# Patient Record
Sex: FEMALE | Race: WHITE | NOT HISPANIC OR LATINO | URBAN - METROPOLITAN AREA
[De-identification: names, ages, dates, MRNs, and addresses within clinical notes are randomized per-mention and may not be internally consistent; named-entity substitution may affect disease eponyms.]

---

## 2017-11-29 PROBLEM — Z00.00 ENCOUNTER FOR PREVENTIVE HEALTH EXAMINATION: Status: ACTIVE | Noted: 2017-11-29

## 2019-04-25 ENCOUNTER — INPATIENT (INPATIENT)
Facility: HOSPITAL | Age: 79
LOS: 3 days | Discharge: NURSING, REHAB OR LONG-TERM CARE | DRG: 683 | End: 2019-04-29
Attending: INTERNAL MEDICINE | Admitting: INTERNAL MEDICINE
Payer: MEDICARE

## 2019-04-25 VITALS
HEART RATE: 118 BPM | RESPIRATION RATE: 20 BRPM | SYSTOLIC BLOOD PRESSURE: 97 MMHG | TEMPERATURE: 98 F | OXYGEN SATURATION: 92 % | DIASTOLIC BLOOD PRESSURE: 59 MMHG | WEIGHT: 199.96 LBS | HEIGHT: 67 IN

## 2019-04-25 DIAGNOSIS — K21.9 GASTRO-ESOPHAGEAL REFLUX DISEASE WITHOUT ESOPHAGITIS: ICD-10-CM

## 2019-04-25 DIAGNOSIS — N17.9 ACUTE KIDNEY FAILURE, UNSPECIFIED: ICD-10-CM

## 2019-04-25 DIAGNOSIS — I50.9 HEART FAILURE, UNSPECIFIED: ICD-10-CM

## 2019-04-25 DIAGNOSIS — E11.9 TYPE 2 DIABETES MELLITUS WITHOUT COMPLICATIONS: ICD-10-CM

## 2019-04-25 DIAGNOSIS — Z29.9 ENCOUNTER FOR PROPHYLACTIC MEASURES, UNSPECIFIED: ICD-10-CM

## 2019-04-25 DIAGNOSIS — I25.10 ATHEROSCLEROTIC HEART DISEASE OF NATIVE CORONARY ARTERY WITHOUT ANGINA PECTORIS: ICD-10-CM

## 2019-04-25 DIAGNOSIS — J44.9 CHRONIC OBSTRUCTIVE PULMONARY DISEASE, UNSPECIFIED: ICD-10-CM

## 2019-04-25 LAB
ALBUMIN SERPL ELPH-MCNC: 3.4 G/DL — LOW (ref 3.5–5)
ALP SERPL-CCNC: 80 U/L — SIGNIFICANT CHANGE UP (ref 40–120)
ALT FLD-CCNC: 18 U/L DA — SIGNIFICANT CHANGE UP (ref 10–60)
ANION GAP SERPL CALC-SCNC: 16 MMOL/L — SIGNIFICANT CHANGE UP (ref 5–17)
APPEARANCE UR: ABNORMAL
AST SERPL-CCNC: 13 U/L — SIGNIFICANT CHANGE UP (ref 10–40)
BASOPHILS # BLD AUTO: 0.03 K/UL — SIGNIFICANT CHANGE UP (ref 0–0.2)
BASOPHILS NFR BLD AUTO: 0.3 % — SIGNIFICANT CHANGE UP (ref 0–2)
BILIRUB SERPL-MCNC: 0.7 MG/DL — SIGNIFICANT CHANGE UP (ref 0.2–1.2)
BILIRUB UR-MCNC: NEGATIVE — SIGNIFICANT CHANGE UP
BUN SERPL-MCNC: 152 MG/DL — HIGH (ref 7–18)
CALCIUM SERPL-MCNC: 9.9 MG/DL — SIGNIFICANT CHANGE UP (ref 8.4–10.5)
CHLORIDE SERPL-SCNC: 86 MMOL/L — LOW (ref 96–108)
CK SERPL-CCNC: 63 U/L — SIGNIFICANT CHANGE UP (ref 21–215)
CO2 SERPL-SCNC: 32 MMOL/L — HIGH (ref 22–31)
COLOR SPEC: YELLOW — SIGNIFICANT CHANGE UP
CREAT ?TM UR-MCNC: 53 MG/DL — SIGNIFICANT CHANGE UP
CREAT SERPL-MCNC: 4.16 MG/DL — HIGH (ref 0.5–1.3)
DIFF PNL FLD: ABNORMAL
EOSINOPHIL # BLD AUTO: 0.04 K/UL — SIGNIFICANT CHANGE UP (ref 0–0.5)
EOSINOPHIL NFR BLD AUTO: 0.3 % — SIGNIFICANT CHANGE UP (ref 0–6)
GLUCOSE BLDC GLUCOMTR-MCNC: 168 MG/DL — HIGH (ref 70–99)
GLUCOSE BLDC GLUCOMTR-MCNC: 187 MG/DL — HIGH (ref 70–99)
GLUCOSE SERPL-MCNC: 232 MG/DL — HIGH (ref 70–99)
GLUCOSE UR QL: 250
HCT VFR BLD CALC: 38.7 % — SIGNIFICANT CHANGE UP (ref 34.5–45)
HGB BLD-MCNC: 12.7 G/DL — SIGNIFICANT CHANGE UP (ref 11.5–15.5)
IMM GRANULOCYTES NFR BLD AUTO: 0.9 % — SIGNIFICANT CHANGE UP (ref 0–1.5)
INR BLD: 1.16 RATIO — SIGNIFICANT CHANGE UP (ref 0.88–1.16)
KETONES UR-MCNC: ABNORMAL
LACTATE SERPL-SCNC: 2.2 MMOL/L — HIGH (ref 0.7–2)
LEUKOCYTE ESTERASE UR-ACNC: ABNORMAL
LIDOCAIN IGE QN: 171 U/L — SIGNIFICANT CHANGE UP (ref 73–393)
LYMPHOCYTES # BLD AUTO: 0.61 K/UL — LOW (ref 1–3.3)
LYMPHOCYTES # BLD AUTO: 5.2 % — LOW (ref 13–44)
MCHC RBC-ENTMCNC: 30.2 PG — SIGNIFICANT CHANGE UP (ref 27–34)
MCHC RBC-ENTMCNC: 32.8 GM/DL — SIGNIFICANT CHANGE UP (ref 32–36)
MCV RBC AUTO: 91.9 FL — SIGNIFICANT CHANGE UP (ref 80–100)
MONOCYTES # BLD AUTO: 0.59 K/UL — SIGNIFICANT CHANGE UP (ref 0–0.9)
MONOCYTES NFR BLD AUTO: 5.1 % — SIGNIFICANT CHANGE UP (ref 2–14)
MYOGLOBIN SERPL-MCNC: 226 NG/ML — HIGH (ref 9–82)
NEUTROPHILS # BLD AUTO: 10.3 K/UL — HIGH (ref 1.8–7.4)
NEUTROPHILS NFR BLD AUTO: 88.2 % — HIGH (ref 43–77)
NITRITE UR-MCNC: NEGATIVE — SIGNIFICANT CHANGE UP
NRBC # BLD: 0 /100 WBCS — SIGNIFICANT CHANGE UP (ref 0–0)
OSMOLALITY UR: 401 MOS/KG — SIGNIFICANT CHANGE UP (ref 50–1200)
PH UR: 5 — SIGNIFICANT CHANGE UP (ref 5–8)
PLATELET # BLD AUTO: 207 K/UL — SIGNIFICANT CHANGE UP (ref 150–400)
POTASSIUM SERPL-MCNC: 4 MMOL/L — SIGNIFICANT CHANGE UP (ref 3.5–5.3)
POTASSIUM SERPL-SCNC: 4 MMOL/L — SIGNIFICANT CHANGE UP (ref 3.5–5.3)
PROT SERPL-MCNC: 7.1 G/DL — SIGNIFICANT CHANGE UP (ref 6–8.3)
PROT UR-MCNC: 30 MG/DL
PROTHROM AB SERPL-ACNC: 12.9 SEC — SIGNIFICANT CHANGE UP (ref 10–12.9)
RBC # BLD: 4.21 M/UL — SIGNIFICANT CHANGE UP (ref 3.8–5.2)
RBC # FLD: 14.9 % — HIGH (ref 10.3–14.5)
SODIUM SERPL-SCNC: 134 MMOL/L — LOW (ref 135–145)
SODIUM UR-SCNC: 45 MMOL/L — SIGNIFICANT CHANGE UP (ref 40–220)
SP GR SPEC: 1.01 — SIGNIFICANT CHANGE UP (ref 1.01–1.02)
TROPONIN I SERPL-MCNC: 0.09 NG/ML — HIGH (ref 0–0.04)
UROBILINOGEN FLD QL: NEGATIVE — SIGNIFICANT CHANGE UP
WBC # BLD: 11.68 K/UL — HIGH (ref 3.8–10.5)
WBC # FLD AUTO: 11.68 K/UL — HIGH (ref 3.8–10.5)

## 2019-04-25 PROCEDURE — 71250 CT THORAX DX C-: CPT | Mod: 26

## 2019-04-25 PROCEDURE — 72170 X-RAY EXAM OF PELVIS: CPT | Mod: 26

## 2019-04-25 PROCEDURE — 93010 ELECTROCARDIOGRAM REPORT: CPT

## 2019-04-25 PROCEDURE — 99285 EMERGENCY DEPT VISIT HI MDM: CPT | Mod: 25

## 2019-04-25 PROCEDURE — 71045 X-RAY EXAM CHEST 1 VIEW: CPT | Mod: 26

## 2019-04-25 RX ORDER — AZITHROMYCIN 500 MG/1
500 TABLET, FILM COATED ORAL DAILY
Qty: 0 | Refills: 0 | Status: DISCONTINUED | OUTPATIENT
Start: 2019-04-25 | End: 2019-04-29

## 2019-04-25 RX ORDER — ALBUTEROL 90 UG/1
2 AEROSOL, METERED ORAL EVERY 6 HOURS
Qty: 0 | Refills: 0 | Status: DISCONTINUED | OUTPATIENT
Start: 2019-04-25 | End: 2019-04-29

## 2019-04-25 RX ORDER — SODIUM CHLORIDE 9 MG/ML
1000 INJECTION INTRAMUSCULAR; INTRAVENOUS; SUBCUTANEOUS
Qty: 0 | Refills: 0 | Status: DISCONTINUED | OUTPATIENT
Start: 2019-04-25 | End: 2019-04-25

## 2019-04-25 RX ORDER — SODIUM CHLORIDE 9 MG/ML
500 INJECTION INTRAMUSCULAR; INTRAVENOUS; SUBCUTANEOUS ONCE
Qty: 0 | Refills: 0 | Status: COMPLETED | OUTPATIENT
Start: 2019-04-25 | End: 2019-04-25

## 2019-04-25 RX ORDER — MULTIVIT-MIN/FERROUS GLUCONATE 9 MG/15 ML
1 LIQUID (ML) ORAL DAILY
Qty: 0 | Refills: 0 | Status: DISCONTINUED | OUTPATIENT
Start: 2019-04-25 | End: 2019-04-29

## 2019-04-25 RX ORDER — CEFTRIAXONE 500 MG/1
1 INJECTION, POWDER, FOR SOLUTION INTRAMUSCULAR; INTRAVENOUS EVERY 24 HOURS
Qty: 0 | Refills: 0 | Status: DISCONTINUED | OUTPATIENT
Start: 2019-04-25 | End: 2019-04-29

## 2019-04-25 RX ORDER — SODIUM CHLORIDE 9 MG/ML
1000 INJECTION INTRAMUSCULAR; INTRAVENOUS; SUBCUTANEOUS ONCE
Qty: 0 | Refills: 0 | Status: COMPLETED | OUTPATIENT
Start: 2019-04-25 | End: 2019-04-25

## 2019-04-25 RX ORDER — BUDESONIDE AND FORMOTEROL FUMARATE DIHYDRATE 160; 4.5 UG/1; UG/1
2 AEROSOL RESPIRATORY (INHALATION)
Qty: 0 | Refills: 0 | Status: DISCONTINUED | OUTPATIENT
Start: 2019-04-25 | End: 2019-04-29

## 2019-04-25 RX ORDER — FLUCONAZOLE 150 MG/1
400 TABLET ORAL ONCE
Qty: 0 | Refills: 0 | Status: COMPLETED | OUTPATIENT
Start: 2019-04-25 | End: 2019-04-25

## 2019-04-25 RX ORDER — ACETAMINOPHEN 500 MG
1000 TABLET ORAL ONCE
Qty: 0 | Refills: 0 | Status: COMPLETED | OUTPATIENT
Start: 2019-04-25 | End: 2019-04-25

## 2019-04-25 RX ORDER — FLUCONAZOLE 150 MG/1
100 TABLET ORAL EVERY 24 HOURS
Qty: 0 | Refills: 0 | Status: DISCONTINUED | OUTPATIENT
Start: 2019-04-26 | End: 2019-04-29

## 2019-04-25 RX ORDER — NYSTATIN 500MM UNIT
500000 POWDER (EA) MISCELLANEOUS
Qty: 0 | Refills: 0 | Status: DISCONTINUED | OUTPATIENT
Start: 2019-04-25 | End: 2019-04-29

## 2019-04-25 RX ORDER — PANTOPRAZOLE SODIUM 20 MG/1
40 TABLET, DELAYED RELEASE ORAL
Qty: 0 | Refills: 0 | Status: DISCONTINUED | OUTPATIENT
Start: 2019-04-25 | End: 2019-04-29

## 2019-04-25 RX ORDER — INSULIN LISPRO 100/ML
VIAL (ML) SUBCUTANEOUS
Qty: 0 | Refills: 0 | Status: DISCONTINUED | OUTPATIENT
Start: 2019-04-25 | End: 2019-04-29

## 2019-04-25 RX ORDER — TIOTROPIUM BROMIDE 18 UG/1
1 CAPSULE ORAL; RESPIRATORY (INHALATION) DAILY
Qty: 0 | Refills: 0 | Status: DISCONTINUED | OUTPATIENT
Start: 2019-04-25 | End: 2019-04-29

## 2019-04-25 RX ORDER — ASPIRIN/CALCIUM CARB/MAGNESIUM 324 MG
325 TABLET ORAL DAILY
Qty: 0 | Refills: 0 | Status: DISCONTINUED | OUTPATIENT
Start: 2019-04-25 | End: 2019-04-29

## 2019-04-25 RX ORDER — HEPARIN SODIUM 5000 [USP'U]/ML
5000 INJECTION INTRAVENOUS; SUBCUTANEOUS EVERY 8 HOURS
Qty: 0 | Refills: 0 | Status: DISCONTINUED | OUTPATIENT
Start: 2019-04-25 | End: 2019-04-29

## 2019-04-25 RX ADMIN — TIOTROPIUM BROMIDE 1 CAPSULE(S): 18 CAPSULE ORAL; RESPIRATORY (INHALATION) at 15:32

## 2019-04-25 RX ADMIN — SODIUM CHLORIDE 1000 MILLILITER(S): 9 INJECTION INTRAMUSCULAR; INTRAVENOUS; SUBCUTANEOUS at 13:50

## 2019-04-25 RX ADMIN — Medication 1000 MILLIGRAM(S): at 10:29

## 2019-04-25 RX ADMIN — PANTOPRAZOLE SODIUM 40 MILLIGRAM(S): 20 TABLET, DELAYED RELEASE ORAL at 13:49

## 2019-04-25 RX ADMIN — AZITHROMYCIN 500 MILLIGRAM(S): 500 TABLET, FILM COATED ORAL at 13:49

## 2019-04-25 RX ADMIN — SODIUM CHLORIDE 60 MILLILITER(S): 9 INJECTION INTRAMUSCULAR; INTRAVENOUS; SUBCUTANEOUS at 17:48

## 2019-04-25 RX ADMIN — Medication 500000 UNIT(S): at 23:23

## 2019-04-25 RX ADMIN — HEPARIN SODIUM 5000 UNIT(S): 5000 INJECTION INTRAVENOUS; SUBCUTANEOUS at 13:47

## 2019-04-25 RX ADMIN — HEPARIN SODIUM 5000 UNIT(S): 5000 INJECTION INTRAVENOUS; SUBCUTANEOUS at 23:21

## 2019-04-25 RX ADMIN — SODIUM CHLORIDE 75 MILLILITER(S): 9 INJECTION INTRAMUSCULAR; INTRAVENOUS; SUBCUTANEOUS at 15:08

## 2019-04-25 RX ADMIN — Medication 325 MILLIGRAM(S): at 13:48

## 2019-04-25 RX ADMIN — SODIUM CHLORIDE 1000 MILLILITER(S): 9 INJECTION INTRAMUSCULAR; INTRAVENOUS; SUBCUTANEOUS at 10:29

## 2019-04-25 RX ADMIN — Medication 500000 UNIT(S): at 13:47

## 2019-04-25 RX ADMIN — SODIUM CHLORIDE 1000 MILLILITER(S): 9 INJECTION INTRAMUSCULAR; INTRAVENOUS; SUBCUTANEOUS at 08:39

## 2019-04-25 RX ADMIN — FLUCONAZOLE 100 MILLIGRAM(S): 150 TABLET ORAL at 13:49

## 2019-04-25 RX ADMIN — Medication 500000 UNIT(S): at 18:02

## 2019-04-25 RX ADMIN — Medication 1: at 16:44

## 2019-04-25 RX ADMIN — Medication 1: at 23:22

## 2019-04-25 RX ADMIN — CEFTRIAXONE 100 GRAM(S): 500 INJECTION, POWDER, FOR SOLUTION INTRAMUSCULAR; INTRAVENOUS at 15:32

## 2019-04-25 RX ADMIN — Medication 20 MILLIGRAM(S): at 13:48

## 2019-04-25 RX ADMIN — Medication 1 TABLET(S): at 13:48

## 2019-04-25 RX ADMIN — BUDESONIDE AND FORMOTEROL FUMARATE DIHYDRATE 2 PUFF(S): 160; 4.5 AEROSOL RESPIRATORY (INHALATION) at 23:23

## 2019-04-25 RX ADMIN — Medication 400 MILLIGRAM(S): at 08:39

## 2019-04-25 NOTE — H&P ADULT - ASSESSMENT
HPI: 79 y/o old female from home with PMHx of HTN, COPD on home oxygen 4 liters , obesity, ,CAD, Aortic Valve replacement only on full dose aspirin DM, lower back pain who was brought to the hospital by daughter due to  weakness last few weeks and very poor oral intake, worsening last 2 days, due to sore throat. Family also report that her knees gave in and she slid down to the floor, no trauma or LOC, chest pain or palpitation. Unable to get up for 2-3 hrs. Denies nausea, vomiting, diarrhea, abdominal pain, SOB, chest pain, FREEMAN, palpitations, dizziness, headache, cough, wheezing, joint pain or swelling, fever, chills.     ER work up was done . Labs showing acute renal failure, CT chest was done showing possible consolidation. Discussed with family members she has history of pulmonary infiltrates on the right side.(NOT A NEW FINDING, will continue to follow up as outpatient)    Will be admitted to the floor due to KYLE likely secondary to Poor oral intake in the setting of thrush. Further renal work up will be done

## 2019-04-25 NOTE — ED PROVIDER NOTE - PROGRESS NOTE DETAILS
Patient remains comfortable. No Respiratory distress. Workup shows KYLE and elevated troponin. Will admit. Sign out complete with Dr. Alicea and Dr. Edward.

## 2019-04-25 NOTE — PROGRESS NOTE ADULT - SUBJECTIVE AND OBJECTIVE BOX
Patient is a 78y old  Female who presents with a chief complaint of weakness last few weeks/poor po intake/fall at home, lab showed ARF, IV hydration, renal consult, renal sonogram, treat candida esophagitis, monitor BMP daily for 3 days. X ray of l spine    INTERVAL HPI/OVERNIGHT EVENTS:  T(C): 36.6 (04-25-19 @ 10:32), Max: 36.7 (04-25-19 @ 07:48)  HR: 68 (04-25-19 @ 10:32) (68 - 118)  BP: 100/78 (04-25-19 @ 10:32) (97/59 - 150/109)  RR: 16 (04-25-19 @ 10:32) (16 - 20)  SpO2: 100% (04-25-19 @ 09:18) (92% - 100%)  Wt(kg): --    LABS:                        12.7   11.68 )-----------( 207      ( 25 Apr 2019 08:44 )             38.7     04-25    134<L>  |  86<L>  |  152<H>  ----------------------------<  232<H>  4.0   |  32<H>  |  4.16<H>    Ca    9.9      25 Apr 2019 08:44    TPro  7.1  /  Alb  3.4<L>  /  TBili  0.7  /  DBili  x   /  AST  13  /  ALT  18  /  AlkPhos  80  04-25    PT/INR - ( 25 Apr 2019 08:44 )   PT: 12.9 sec;   INR: 1.16 ratio             CAPILLARY BLOOD GLUCOSE      POCT Blood Glucose.: 222 mg/dL (25 Apr 2019 07:54)        RADIOLOGY & ADDITIONAL TESTS:  < from: CT Chest No Cont (04.25.19 @ 10:28) >    IMPRESSION:    When compared with chest x-ray of same date    Patchy areas of consolidation in the right upper lobe correspond to the   abnormality seen on recent chest x-ray. The differential for this   includes infection, sarcoidor malignancy. Follow-up chest CT recommended   in 4-6 weeks to assess for change.    < from: Xray Pelvis AP only (04.25.19 @ 09:03) >    IMPRESSION:    Diffuse osteoporosis may delay visualization fracture.    Otherwise unremarkable.      < end of copied text >      Consultant(s) Notes Reviewed:  [x ] YES  [ ] NO    PHYSICAL EXAM:  GENERAL: well built, well nourished  HEAD:  Atraumatic, Normocephalic  EYES: EOMI, PERRLA, conjunctiva and sclera clear  ENT: No tonsillar erythema, exudates, or enlargement; Moist mucous membranes, Good dentition, No lesions  NECK: Supple, No JVD, Normal thyroid, no enlarged nodes  NERVOUS SYSTEM:  Alert & Oriented X3, Good concentration; mild LLE limit ROM due to pain  CHEST/LUNG: B/L good air entry; No rales, rhonchi, or wheezing  HEART: S1S2 normal, no S3, Regular rate and rhythm; No murmurs, rubs, or gallops  ABDOMEN: Soft, Nontender, Nondistended; Bowel sounds present  EXTREMITIES:  2+ Peripheral Pulses, No clubbing, cyanosis, mild  edema  LYMPH: No lymphadenopathy noted  SKIN: No rashes or lesions    Care Discussed with Consultants/Other Providers [ x] YES  [ ] NO

## 2019-04-25 NOTE — CONSULT NOTE ADULT - ASSESSMENT
# KYLE sec to a pre-renal state sec to a pre-renal state and ATN from poor oral intake and UTI/pneumonia. Also was on ARB's and lasix. S/p bolus IVF.  Agree with with holding ARB's, lasix and metformin.  cont gentle hydration  cont IV antibiotics  avoid NSAID's and IV contrast media  renal US  strict in's and outs.  rpt renal profile in AM    Many thanks for the consult.

## 2019-04-25 NOTE — ED ADULT TRIAGE NOTE - CHIEF COMPLAINT QUOTE
generalized weakness, fell and sat in the floor, no LOC, no dizziness pre/post fall, O2 dependant, recently for thrush

## 2019-04-25 NOTE — H&P ADULT - PROBLEM SELECTOR PLAN 7
IMPROVE VTE score: 4  Will manage with:  Heparin S/C    [ ] Previous VTE                                    3  [ ] Thrombophilia                                  2  [ x] Lower limb paralysis                        2  (unable to hold up >15 seconds)    [ ] Current Cancer (within 6 months)        2   [x] Immobilization > 24 hrs                    1  [ ] ICU/CCU stay > 24 hrs                      1  [x] Age > 60                                         1

## 2019-04-25 NOTE — H&P ADULT - PROBLEM SELECTOR PLAN 3
History of CHF on Lasix 20mg daily, Entresto and Olmesartan  Will hold medication in the setting of KYLE  Restart as needed

## 2019-04-25 NOTE — H&P ADULT - PROBLEM SELECTOR PLAN 2
History of COPD  on home Oxygen, Zithromax 500mg PO daily, Advair, Spiriva, Prednisone 2mg daily   C/W home medications  Not in any exacerbation

## 2019-04-25 NOTE — H&P ADULT - NSHPPHYSICALEXAM_GEN_ALL_CORE
General - NAD, sitting up in bed, well groomed  ENT - Nonicteric sclerae, PERRLA, EOMI. White placques in the palate   Neck - No noticeable or palpable swelling, redness or rash around throat or on face  Cardiovascular - RRR  no JVD, no carotid bruits  Lungs - Clear to ascultation, no use of accessory muscles, no crackles or wheezes.  Abdomen - Normal bowel sounds, abdomen soft and nontender  Extremities - No edema, cyanosis or clubbing  Musculo Skeletal - 5/5 strength, normal range of motion, no swollen or erythematous  joints.  Neurological – Alert and oriented x 3, CN 2-12 grossly intact.

## 2019-04-25 NOTE — H&P ADULT - NSICDXPASTMEDICALHX_GEN_ALL_CORE_FT
PAST MEDICAL HISTORY:  CAD (coronary artery disease)     Chronic CHF     COPD (chronic obstructive pulmonary disease)     DM (diabetes mellitus)     H/O aortic valve replacement

## 2019-04-25 NOTE — H&P ADULT - HISTORY OF PRESENT ILLNESS
HPI: 79 y/o old female from home with PMHx of HTN, COPD on home oxygen 4 liters , obesity, ,CAD, Aortic Valve replacement only on full dose aspirin DM, lower back pain who was brought to the hospital by daughter due to  weakness last few weeks and very poor oral intake, worsening last 2 days, due to sore throat. Family also report that her knees gave in and she slid down to the floor, no trauma or LOC, chest pain or palpitation. Unable to get up for 2-3 hrs. Denies nausea, vomiting, diarrhea, abdominal pain, SOB, chest pain, FREEMAN, palpitations, dizziness, headache, cough, wheezing, joint pain or swelling, fever, chills.     ER work up was done . Labs showing acute renal failure, CT chest was done showing possible consolidation. Discussed with family members she has history of pulmonary infiltrates on the right side.(NOT A NEW FINDING, will continue to follow up as outpatient)

## 2019-04-25 NOTE — H&P ADULT - PROBLEM SELECTOR PLAN 5
-Patient takes at home: Metformin 500 BID  -Hold oral hypoglycemics in the setting of Renal Failure  -Continue with Insulin Sliding Scale

## 2019-04-25 NOTE — ED ADULT NURSE NOTE - NSIMPLEMENTINTERV_GEN_ALL_ED
Implemented All Universal Safety Interventions:  [place] to call system. Call bell, personal items and telephone within reach. Instruct patient to call for assistance. Room bathroom lighting operational. Non-slip footwear when patient is off stretcher. Physically safe environment: no spills, clutter or unnecessary equipment. Stretcher in lowest position, wheels locked, appropriate side rails in place.

## 2019-04-25 NOTE — ED PROVIDER NOTE - CHPI ED SYMPTOMS NEG
no head injury, no lower extremity swelling, no chest pain, no SOB, no nausea, no vomiting, no lightheadedness, no change in mental status

## 2019-04-25 NOTE — CONSULT NOTE ADULT - SUBJECTIVE AND OBJECTIVE BOX
[facility] : Progress Note :: [phone], (office [phone]),   Dr Barone / Dr Fontanez / Dr Schreiber / Dr Koo / Dr Harvey HOOKER / Dr Mcnally / Dr Hernandez / Dr Steven yip  _____________________________________________________________________________________________    78 yr old female, a poor historian. Brought in by family with poor oral intake for a few days and generalized weakness. Noted to have elevated SCR of 4.1 and hypotension. prompting a renal consult. Deneis any prior history of kidney dse but has chronic "lung lesion". In ED, urinalysis showed UTI, leucocytosis and RUL lesion. s/p 3 litres NS bolus and IV antibiotics.     PAST MEDICAL & SURGICAL HISTORY:  Chronic CHF  H/O aortic valve replacement  DM (diabetes mellitus)  CAD (coronary artery disease)  COPD (chronic obstructive pulmonary disease)  No significant past surgical history    No Known Allergies    Home Medications Reviewed  Hospital Medications:   MEDICATIONS  (STANDING):  aspirin 325 milliGRAM(s) Oral daily  azithromycin   Tablet 500 milliGRAM(s) Oral daily  buDESOnide 160 MICROgram(s)/formoterol 4.5 MICROgram(s) Inhaler 2 Puff(s) Inhalation two times a day  cefTRIAXone   IVPB 1 Gram(s) IV Intermittent every 24 hours  heparin  Injectable 5000 Unit(s) SubCutaneous every 8 hours  insulin lispro (HumaLOG) corrective regimen sliding scale   SubCutaneous Before meals and at bedtime  multivitamin/minerals 1 Tablet(s) Oral daily  nystatin    Suspension 933771 Unit(s) Oral four times a day  pantoprazole    Tablet 40 milliGRAM(s) Oral before breakfast  predniSONE   Tablet 20 milliGRAM(s) Oral daily  sodium chloride 0.9%. 1000 milliLiter(s) (60 mL/Hr) IV Continuous <Continuous>  tiotropium 18 MICROgram(s) Capsule 1 Capsule(s) Inhalation daily    SOCIAL HISTORY:  Denies ETOh,Smoking,   FAMILY HISTORY:  No pertinent family history in first degree relatives        VITALS:  T(F): 97.3 (19 @ 15:48), Max: 98.1 (19 @ 07:48)  HR: 90 (19 @ 15:48)  BP: 90/45 (19 @ 15:48)  RR: 19 (19 @ 15:48)  SpO2: 99% (19 @ 15:48)  Wt(kg): --    Height (cm): 170.18 ( @ 07:48)  Weight (kg): 90.7 ( @ 07:48)  BMI (kg/m2): 31.3 ( @ :48)  BSA (m2): 2.02 ( @ :48)    PHYSICAL EXAM:  Constitutional: NAD  HEENT: anicteric sclera, oropharynx clear.  Neck: No JVD  Respiratory: CTAB, no wheezes, rales or rhonchi  Cardiovascular: S1, S2, RRR  Gastrointestinal: BS+, soft, NT/ND  Extremities: No cyanosis or clubbing. No peripheral edema  Neurological: no focal deficits  : No CVA tenderness. No osborne.       LABS:      134<L>  |  86<L>  |  152<H>  ----------------------------<  232<H>  4.0   |  32<H>  |  4.16<H>    Ca    9.9      2019 08:44    TPro  7.1  /  Alb  3.4<L>  /  TBili  0.7  /  DBili      /  AST  13  /  ALT  18  /  AlkPhos  80      Creatinine Trend: 4.16 <--                        12.7   11.68 )-----------( 207      ( 2019 08:44 )             38.7     Urine Studies:  Urinalysis Basic - ( 2019 11:26 )    Color: Yellow / Appearance: Slightly Turbid / S.010 / pH:   Gluc:  / Ketone: Trace  / Bili: Negative / Urobili: Negative   Blood:  / Protein: 30 mg/dL / Nitrite: Negative   Leuk Esterase: Moderate / RBC: 10-25 /HPF / WBC 11-25 /HPF   Sq Epi:  / Non Sq Epi: Moderate /HPF / Bacteria: Few /HPF      Osmolality, Random Urine: 401 mos/kg ( @ 15:31)  Creatinine, Random Urine: 53 mg/dL ( @ 15:31)  Sodium, Random Urine: 45 mmol/L ( @ 15:31)    RADIOLOGY & ADDITIONAL STUDIES:

## 2019-04-25 NOTE — ED PROVIDER NOTE - OBJECTIVE STATEMENT
79 y/o F pt with a PMHx of COPD (on 4L O2 nasal cannula at all times) and CAD and no significant PSHx presents to ED c/o generalized weakness for the past several days. Pt reports that she is here today due to a recent episode of weakness where her legs buckled while she was in a chair causing her to slide off and remain on the ground for 2-3 hours. Pt states that she was not able to get up at the time and was found by her family. Pt states that she is experiencing pain in her buttocks secondary to this episode. Pt states that her usage of 4L O2 has remained unchanged since the onset of the weakness episodes. Pt denies head injury, lower extremity swelling, chest pain, SOB, nausea, vomiting, lightheadedness, change in mental status, or any other acute complaints. NKDA.

## 2019-04-25 NOTE — CHART NOTE - NSCHARTNOTEFT_GEN_A_CORE
Patient was seen and examined, labs and imaging reviewed.  -She was comfortable, saturating well on 3 L NC, she reported that Patient was seen and examined, labs and imaging reviewed.  -She was comfortable, saturating well on 3 L NC, she reported that she has oral thrush and it has improved since start of medications but she is unable to eat much. She has not had anything for last 2 days except some soup this afternoon.   Her blood pressure has been running low, received 500 cc bolus this afternoon. she also got 2 L bolus in ED.   -Since patient is not eating will c/w mild hydration for now but hold off to excess fluids as it may lead to pulmonary congestion given CHF history.

## 2019-04-25 NOTE — PROGRESS NOTE ADULT - ASSESSMENT
78 yr old female from home , H/O HTN/COPD/obesity/CAD/DM/lower back pain, admit hospital for weakness last few weeks and very poo po intake, worsening last 2 days, due to sore throat, S/P fall at home unable to get up for 2-3 hrs, in ER, lab showed acute renal failure, CT chest possible consolidation vs saicoid, IV hydration renal sonogram, moniotr BMP daily for 3 days, F/C insertion, renal consult X ray of L spine C/O LLE pain and weak. fall precaution DVT prophylaxis

## 2019-04-25 NOTE — H&P ADULT - PROBLEM SELECTOR PLAN 1
KYLE due to pre-renal azotemia vs intrinsic renal vs post-renal.  Concern for prerenal in the setting of poor oral intake  HOLD Metformin, Lasix, ACE and ARB for now  - urine lytes (uosm, urine sodium, urine creatinine   - renal sono to assess kidney size and r/o hydronephrosis.   - Monitor I/O's daily.   - Nephrology Dr Barone consulted

## 2019-04-26 DIAGNOSIS — N39.0 URINARY TRACT INFECTION, SITE NOT SPECIFIED: ICD-10-CM

## 2019-04-26 DIAGNOSIS — B37.0 CANDIDAL STOMATITIS: ICD-10-CM

## 2019-04-26 LAB
ANION GAP SERPL CALC-SCNC: 8 MMOL/L — SIGNIFICANT CHANGE UP (ref 5–17)
BASOPHILS # BLD AUTO: 0.01 K/UL — SIGNIFICANT CHANGE UP (ref 0–0.2)
BASOPHILS NFR BLD AUTO: 0.2 % — SIGNIFICANT CHANGE UP (ref 0–2)
BUN SERPL-MCNC: 99 MG/DL — HIGH (ref 7–18)
CALCIUM SERPL-MCNC: 9.3 MG/DL — SIGNIFICANT CHANGE UP (ref 8.4–10.5)
CHLORIDE SERPL-SCNC: 100 MMOL/L — SIGNIFICANT CHANGE UP (ref 96–108)
CK SERPL-CCNC: 42 U/L — SIGNIFICANT CHANGE UP (ref 21–215)
CO2 SERPL-SCNC: 34 MMOL/L — HIGH (ref 22–31)
CREAT SERPL-MCNC: 1.99 MG/DL — HIGH (ref 0.5–1.3)
EOSINOPHIL # BLD AUTO: 0.01 K/UL — SIGNIFICANT CHANGE UP (ref 0–0.5)
EOSINOPHIL NFR BLD AUTO: 0.2 % — SIGNIFICANT CHANGE UP (ref 0–6)
FOLATE SERPL-MCNC: 13.4 NG/ML — SIGNIFICANT CHANGE UP
GLUCOSE BLDC GLUCOMTR-MCNC: 148 MG/DL — HIGH (ref 70–99)
GLUCOSE BLDC GLUCOMTR-MCNC: 165 MG/DL — HIGH (ref 70–99)
GLUCOSE BLDC GLUCOMTR-MCNC: 245 MG/DL — HIGH (ref 70–99)
GLUCOSE BLDC GLUCOMTR-MCNC: 293 MG/DL — HIGH (ref 70–99)
GLUCOSE SERPL-MCNC: 137 MG/DL — HIGH (ref 70–99)
HBA1C BLD-MCNC: 8.5 % — HIGH (ref 4–5.6)
HCT VFR BLD CALC: 35.9 % — SIGNIFICANT CHANGE UP (ref 34.5–45)
HGB BLD-MCNC: 11.4 G/DL — LOW (ref 11.5–15.5)
IMM GRANULOCYTES NFR BLD AUTO: 1 % — SIGNIFICANT CHANGE UP (ref 0–1.5)
LIDOCAIN IGE QN: 141 U/L — SIGNIFICANT CHANGE UP (ref 73–393)
LYMPHOCYTES # BLD AUTO: 0.3 K/UL — LOW (ref 1–3.3)
LYMPHOCYTES # BLD AUTO: 5 % — LOW (ref 13–44)
MAGNESIUM SERPL-MCNC: 2.2 MG/DL — SIGNIFICANT CHANGE UP (ref 1.6–2.6)
MCHC RBC-ENTMCNC: 29.8 PG — SIGNIFICANT CHANGE UP (ref 27–34)
MCHC RBC-ENTMCNC: 31.8 GM/DL — LOW (ref 32–36)
MCV RBC AUTO: 93.7 FL — SIGNIFICANT CHANGE UP (ref 80–100)
MONOCYTES # BLD AUTO: 0.29 K/UL — SIGNIFICANT CHANGE UP (ref 0–0.9)
MONOCYTES NFR BLD AUTO: 4.8 % — SIGNIFICANT CHANGE UP (ref 2–14)
NEUTROPHILS # BLD AUTO: 5.36 K/UL — SIGNIFICANT CHANGE UP (ref 1.8–7.4)
NEUTROPHILS NFR BLD AUTO: 88.8 % — HIGH (ref 43–77)
NRBC # BLD: 0 /100 WBCS — SIGNIFICANT CHANGE UP (ref 0–0)
PHOSPHATE SERPL-MCNC: 3.4 MG/DL — SIGNIFICANT CHANGE UP (ref 2.5–4.5)
PLATELET # BLD AUTO: 159 K/UL — SIGNIFICANT CHANGE UP (ref 150–400)
POTASSIUM SERPL-MCNC: 3.4 MMOL/L — LOW (ref 3.5–5.3)
POTASSIUM SERPL-SCNC: 3.4 MMOL/L — LOW (ref 3.5–5.3)
RBC # BLD: 3.83 M/UL — SIGNIFICANT CHANGE UP (ref 3.8–5.2)
RBC # FLD: 14.9 % — HIGH (ref 10.3–14.5)
SODIUM SERPL-SCNC: 142 MMOL/L — SIGNIFICANT CHANGE UP (ref 135–145)
TSH SERPL-MCNC: 0.31 UU/ML — LOW (ref 0.34–4.82)
UUN UR-MCNC: 648 MG/DL — SIGNIFICANT CHANGE UP
VIT B12 SERPL-MCNC: 327 PG/ML — SIGNIFICANT CHANGE UP (ref 232–1245)
WBC # BLD: 6.03 K/UL — SIGNIFICANT CHANGE UP (ref 3.8–10.5)
WBC # FLD AUTO: 6.03 K/UL — SIGNIFICANT CHANGE UP (ref 3.8–10.5)

## 2019-04-26 RX ORDER — NYSTATIN CREAM 100000 [USP'U]/G
1 CREAM TOPICAL
Qty: 0 | Refills: 0 | Status: DISCONTINUED | OUTPATIENT
Start: 2019-04-26 | End: 2019-04-29

## 2019-04-26 RX ORDER — CHOLECALCIFEROL (VITAMIN D3) 125 MCG
0 CAPSULE ORAL
Qty: 0 | Refills: 0 | COMMUNITY

## 2019-04-26 RX ADMIN — Medication 500000 UNIT(S): at 17:57

## 2019-04-26 RX ADMIN — HEPARIN SODIUM 5000 UNIT(S): 5000 INJECTION INTRAVENOUS; SUBCUTANEOUS at 16:33

## 2019-04-26 RX ADMIN — BUDESONIDE AND FORMOTEROL FUMARATE DIHYDRATE 2 PUFF(S): 160; 4.5 AEROSOL RESPIRATORY (INHALATION) at 09:01

## 2019-04-26 RX ADMIN — Medication 1 TABLET(S): at 11:54

## 2019-04-26 RX ADMIN — AZITHROMYCIN 500 MILLIGRAM(S): 500 TABLET, FILM COATED ORAL at 11:54

## 2019-04-26 RX ADMIN — HEPARIN SODIUM 5000 UNIT(S): 5000 INJECTION INTRAVENOUS; SUBCUTANEOUS at 22:10

## 2019-04-26 RX ADMIN — Medication 3: at 11:55

## 2019-04-26 RX ADMIN — NYSTATIN CREAM 1 APPLICATION(S): 100000 CREAM TOPICAL at 17:57

## 2019-04-26 RX ADMIN — TIOTROPIUM BROMIDE 1 CAPSULE(S): 18 CAPSULE ORAL; RESPIRATORY (INHALATION) at 11:54

## 2019-04-26 RX ADMIN — Medication 20 MILLIGRAM(S): at 06:07

## 2019-04-26 RX ADMIN — Medication 1: at 08:14

## 2019-04-26 RX ADMIN — Medication 500000 UNIT(S): at 06:07

## 2019-04-26 RX ADMIN — BUDESONIDE AND FORMOTEROL FUMARATE DIHYDRATE 2 PUFF(S): 160; 4.5 AEROSOL RESPIRATORY (INHALATION) at 22:46

## 2019-04-26 RX ADMIN — PANTOPRAZOLE SODIUM 40 MILLIGRAM(S): 20 TABLET, DELAYED RELEASE ORAL at 06:07

## 2019-04-26 RX ADMIN — CEFTRIAXONE 100 GRAM(S): 500 INJECTION, POWDER, FOR SOLUTION INTRAMUSCULAR; INTRAVENOUS at 16:33

## 2019-04-26 RX ADMIN — HEPARIN SODIUM 5000 UNIT(S): 5000 INJECTION INTRAVENOUS; SUBCUTANEOUS at 06:07

## 2019-04-26 RX ADMIN — FLUCONAZOLE 50 MILLIGRAM(S): 150 TABLET ORAL at 11:55

## 2019-04-26 RX ADMIN — Medication 2: at 16:40

## 2019-04-26 RX ADMIN — Medication 325 MILLIGRAM(S): at 11:54

## 2019-04-26 RX ADMIN — Medication 500000 UNIT(S): at 11:54

## 2019-04-26 NOTE — PROGRESS NOTE ADULT - PROBLEM SELECTOR PLAN 5
History of CAD on Aspirin  C/W Aspirin History of CHF on Lasix 20mg daily, Entresto and Olmesartan  Will hold medication in the setting of KYLE  -Will re-start once cleared for nephrology.

## 2019-04-26 NOTE — PROGRESS NOTE ADULT - ASSESSMENT
78 yr old female from home , H/O HTN/COPD/obesity/CAD/DM/lower back pain, admit hospital for weakness last few weeks and very poor po intake, worsening last 2 days, due to sore throat, S/P fall at home unable to get up for 2-3 hrs, in ER, lab showed acute renal failure, CT chest possible consolidation vs sarcoid, IV hydration, IV ABS, renal sonogram, moniotr BMP daily for 3 days, F/C insertion, renal follow up, nystatin solution swish and swallow qid, po intake improved today, BUN/CR significant improved today, PT eval.

## 2019-04-26 NOTE — PROGRESS NOTE ADULT - PROBLEM SELECTOR PLAN 4
History of CHF on Lasix 20mg daily, Entresto and Olmesartan  Will hold medication in the setting of KYLE  -Will re-start once cleared for nephrology. History of COPD  on home Oxygen, Zithromax 500mg PO daily, Advair, Spiriva, Prednisone 2mg daily   C/W home medications  Not in any exacerbation

## 2019-04-26 NOTE — PROGRESS NOTE ADULT - ASSESSMENT
77 y/o old female from home with PMHx of HTN, COPD on home oxygen 4 liters , obesity, ,CAD, Aortic Valve replacement only on full dose aspirin DM, lower back pain who was brought to the hospital by daughter due to  weakness last few weeks and very poor oral intake, worsening last 2 days, due to sore throat. Family also report that her knees gave in and she slid down to the floor, no trauma or LOC, chest pain or palpitation. Unable to get up for 2-3 hrs. Denies nausea, vomiting, diarrhea, abdominal pain, SOB, chest pain, FREEMAN, palpitations, dizziness, headache, cough, wheezing, joint pain or swelling, fever, chills.     ER work up was done . Labs showing acute renal failure, CT chest was done showing possible consolidation. Discussed with family members she has history of pulmonary infiltrates on the right side.(NOT A NEW FINDING, will continue to follow up as outpatient)    Patient is admitted for KYLE.

## 2019-04-26 NOTE — PHYSICAL THERAPY INITIAL EVALUATION ADULT - PLANNED THERAPY INTERVENTIONS, PT EVAL
bed mobility training/neuromuscular re-education/balance training/gait training/strengthening/transfer training

## 2019-04-26 NOTE — PHYSICAL THERAPY INITIAL EVALUATION ADULT - GAIT DEVIATIONS NOTED, PT EVAL
decreased valentina/decreased step length/decreased velocity of limb motion/increased time in double stance

## 2019-04-26 NOTE — PHYSICAL THERAPY INITIAL EVALUATION ADULT - GENERAL OBSERVATIONS, REHAB EVAL
Pt seen supine in bed w/telemetry, osborne cath, AOx3, able to follow one step simple commands, denied c/o pain/discomfort. Pt was cooperative during assessment

## 2019-04-26 NOTE — PHYSICAL THERAPY INITIAL EVALUATION ADULT - PERTINENT HX OF CURRENT PROBLEM, REHAB EVAL
Pt admitted with poor oral intake, SOB/dyspnea on exertion, generalized weakness s/p sliding off the chair-claims she did not fall

## 2019-04-26 NOTE — PHYSICAL THERAPY INITIAL EVALUATION ADULT - DIAGNOSIS, PT EVAL
Difficulty with bed mobility, transfers and ambulation due to generalized weakness and poor activity tolerance

## 2019-04-26 NOTE — PROGRESS NOTE ADULT - PROBLEM SELECTOR PLAN 7
History of GERD on Protonix   - C/W Home medications -Patient takes at home: Metformin 500 BID  -Hold oral hypoglycemics in the setting of Renal Failure  -Continue with Insulin Sliding Scale -Patient takes at home: Metformin 500 BID  -Hold oral hypoglycemics in the setting of Renal Failure  -Continue with Insulin Sliding Scale  -HbA1C- 8%

## 2019-04-26 NOTE — PROGRESS NOTE ADULT - PROBLEM SELECTOR PLAN 3
History of COPD  on home Oxygen, Zithromax 500mg PO daily, Advair, Spiriva, Prednisone 2mg daily   C/W home medications  Not in any exacerbation -UA positive, could be contamination.   -C/w ceftriaxone  -Will send urine cultures

## 2019-04-26 NOTE — PROGRESS NOTE ADULT - ASSESSMENT
# KYLE sec to a pre-renal state sec to a pre-renal state and ATN from poor oral intake and UTI/pneumonia. Also was on ARB's and lasix.on hold. S/p  IVF.  renal function much improved and brisk urine output  has a osborne  cont ABX for PNA and UTI  cont to avoid NSAID's and IV contrast.  rpt BMP in AM

## 2019-04-26 NOTE — PROGRESS NOTE ADULT - SUBJECTIVE AND OBJECTIVE BOX
[facility] : Progress Note :: [phone], (office [phone]),   Dr Barone / Dr Fontanez / Dr Schreiber / Dr Koo / Dr Harvey HOOKER / Dr Mcnally / Dr Hernandez / Dr Steven yip  _____________________________________________________________________________________________  no events overnight    No Known Drug Allergies  strawberry (Unknown)    Hospital Medications:   MEDICATIONS  (STANDING):  aspirin 325 milliGRAM(s) Oral daily  azithromycin   Tablet 500 milliGRAM(s) Oral daily  buDESOnide 160 MICROgram(s)/formoterol 4.5 MICROgram(s) Inhaler 2 Puff(s) Inhalation two times a day  cefTRIAXone   IVPB 1 Gram(s) IV Intermittent every 24 hours  fluconAZOLE IVPB 100 milliGRAM(s) IV Intermittent every 24 hours  heparin  Injectable 5000 Unit(s) SubCutaneous every 8 hours  insulin lispro (HumaLOG) corrective regimen sliding scale   SubCutaneous Before meals and at bedtime  multivitamin/minerals 1 Tablet(s) Oral daily  nystatin    Suspension 769823 Unit(s) Oral four times a day  nystatin Powder 1 Application(s) Topical two times a day  pantoprazole    Tablet 40 milliGRAM(s) Oral before breakfast  predniSONE   Tablet 20 milliGRAM(s) Oral daily  tiotropium 18 MICROgram(s) Capsule 1 Capsule(s) Inhalation daily.    VITALS:  T(F): 97.9 (19 @ 15:25), Max: 98.3 (19 @ 23:45)  HR: 89 (19 @ 15:25)  BP: 107/67 (19 @ 15:25)  RR: 18 (19 @ 15:25)  SpO2: 100% (19 @ 15:25)  Wt(kg): --     @ 07:01  -   @ 07:00  --------------------------------------------------------  IN: 870 mL / OUT: 2950 mL / NET: -2080 mL     @ 07:01  -   @ 16:50  --------------------------------------------------------  IN: 318 mL / OUT: 700 mL / NET: -382 mL        PHYSICAL EXAM:  Constitutional: NAD  HEENT: anicteric sclera, oropharynx clear.  Neck: No JVD  Respiratory: CTAB, no wheezes, rales or rhonchi  Cardiovascular: S1, S2, RRR  Gastrointestinal: BS+, soft, NT/ND  Extremities:  No peripheral edema  Neurological: A/O x 3, no focal deficits  Psychiatric: Normal mood, normal affect  : No CVA tenderness. idnia.       LABS:      142  |  100  |  99<H>  ----------------------------<  137<H>  3.4<L>   |  34<H>  |  1.99<H>    Ca    9.3      2019 07:16  Phos  3.4       Mg     2.2         TPro  7.1  /  Alb  3.4<L>  /  TBili  0.7  /  DBili      /  AST  13  /  ALT  18  /  AlkPhos  80      Creatinine Trend: 1.99 <--, 4.16 <--                        11.4   6.03  )-----------( 159      ( 2019 07:16 )             35.9     Urine Studies:  Urinalysis Basic - ( 2019 11:26 )    Color: Yellow / Appearance: Slightly Turbid / S.010 / pH:   Gluc:  / Ketone: Trace  / Bili: Negative / Urobili: Negative   Blood:  / Protein: 30 mg/dL / Nitrite: Negative   Leuk Esterase: Moderate / RBC: 10-25 /HPF / WBC 11-25 /HPF   Sq Epi:  / Non Sq Epi: Moderate /HPF / Bacteria: Few /HPF      Osmolality, Random Urine: 401 mos/kg ( @ 15:31)  Creatinine, Random Urine: 53 mg/dL ( @ 15:31)  Sodium, Random Urine: 45 mmol/L ( @ 15:31)    RADIOLOGY & ADDITIONAL STUDIES:

## 2019-04-26 NOTE — PROGRESS NOTE ADULT - PROBLEM SELECTOR PLAN 1
-KYLE sec to a pre-renal state sec to a pre-renal state and ATN from poor oral intake and UTI/pneumonia. Also was on ARB's and Lasix.  -Will hold ARB, Lasix and  metformin.   -She was given IV hydration, creatinine improved to 1.99 today.  -Renal US -KYLE sec to a pre-renal state sec to a pre-renal state and ATN from poor oral intake and UTI/pneumonia. Also was on ARB's and Lasix.  -Will hold ARB, Lasix and  metformin.   -She was given IV hydration, creatinine improved to 1.99 today.  -Renal US  -I & O

## 2019-04-26 NOTE — PROGRESS NOTE ADULT - SUBJECTIVE AND OBJECTIVE BOX
PGY 1 Note discussed with supervising resident and primary attending    Patient is a 78y old  Female who presents with a chief complaint of fall/weakness/ARF (2019 09:05)      INTERVAL HPI/OVERNIGHT EVENTS: Patient was seen and examined, offers no new complaints.     MEDICATIONS  (STANDING):  aspirin 325 milliGRAM(s) Oral daily  azithromycin   Tablet 500 milliGRAM(s) Oral daily  buDESOnide 160 MICROgram(s)/formoterol 4.5 MICROgram(s) Inhaler 2 Puff(s) Inhalation two times a day  cefTRIAXone   IVPB 1 Gram(s) IV Intermittent every 24 hours  fluconAZOLE IVPB 100 milliGRAM(s) IV Intermittent every 24 hours  heparin  Injectable 5000 Unit(s) SubCutaneous every 8 hours  insulin lispro (HumaLOG) corrective regimen sliding scale   SubCutaneous Before meals and at bedtime  multivitamin/minerals 1 Tablet(s) Oral daily  nystatin    Suspension 657605 Unit(s) Oral four times a day  nystatin Powder 1 Application(s) Topical two times a day  pantoprazole    Tablet 40 milliGRAM(s) Oral before breakfast  predniSONE   Tablet 20 milliGRAM(s) Oral daily  tiotropium 18 MICROgram(s) Capsule 1 Capsule(s) Inhalation daily    MEDICATIONS  (PRN):  ALBUTerol    90 MICROgram(s) HFA Inhaler 2 Puff(s) Inhalation every 6 hours PRN Bronchospasm      __________________________________________________  REVIEW OF SYSTEMS:    CONSTITUTIONAL: No fever,   EYES: no acute visual disturbances  NECK: No pain or stiffness  RESPIRATORY: No cough; No shortness of breath  CARDIOVASCULAR: No chest pain, no palpitations  GASTROINTESTINAL: No pain. No nausea or vomiting; No diarrhea   NEUROLOGICAL: No headache or numbness, no tremors  MUSCULOSKELETAL: No joint pain, no muscle pain  GENITOURINARY: no dysuria, no frequency, no hesitancy  PSYCHIATRY: no depression , no anxiety  ALL OTHER  ROS negative        Vital Signs Last 24 Hrs  T(C): 36.6 (2019 11:00), Max: 36.8 (2019 23:45)  T(F): 97.8 (2019 11:00), Max: 98.3 (2019 23:45)  HR: 75 (2019 11:00) (75 - 95)  BP: 100/64 (2019 11:00) (86/52 - 118/67)  BP(mean): --  RR: 18 (2019 11:00) (18 - 20)  SpO2: 95% (2019 11:00) (95% - 100%)    ________________________________________________  PHYSICAL EXAM:    Constitutional: NAD  HEENT: anicteric sclera, oropharynx clear.  Neck: No JVD  Respiratory: CTAB, no wheezes, rales or rhonchi  Cardiovascular: S1, S2, RRR  Gastrointestinal: BS+, soft, NT/ND  Extremities: No cyanosis or clubbing. No peripheral edema  Neurological: no focal deficits  : No CVA tenderness. No osborne.     _________________________________________________  LABS:                        11.4   6.03  )-----------( 159      ( 2019 07:16 )             35.9     04-    142  |  100  |  99<H>  ----------------------------<  137<H>  3.4<L>   |  34<H>  |  1.99<H>    Ca    9.3      2019 07:16  Phos  3.4     -  Mg     2.2     -    TPro  7.1  /  Alb  3.4<L>  /  TBili  0.7  /  DBili  x   /  AST  13  /  ALT  18  /  AlkPhos  80  04-25    PT/INR - ( 2019 08:44 )   PT: 12.9 sec;   INR: 1.16 ratio           Urinalysis Basic - ( 2019 11:26 )    Color: Yellow / Appearance: Slightly Turbid / S.010 / pH: x  Gluc: x / Ketone: Trace  / Bili: Negative / Urobili: Negative   Blood: x / Protein: 30 mg/dL / Nitrite: Negative   Leuk Esterase: Moderate / RBC: 10-25 /HPF / WBC 11-25 /HPF   Sq Epi: x / Non Sq Epi: Moderate /HPF / Bacteria: Few /HPF      CAPILLARY BLOOD GLUCOSE      POCT Blood Glucose.: 293 mg/dL (2019 11:35)  POCT Blood Glucose.: 165 mg/dL (2019 07:59)  POCT Blood Glucose.: 168 mg/dL (2019 22:48)  POCT Blood Glucose.: 187 mg/dL (2019 16:35)        RADIOLOGY & ADDITIONAL TESTS:    Imaging Personally Reviewed:  YES    Consultant(s) Notes Reviewed:   YES    Care Discussed with Consultants : YES     Plan of care was discussed with patient and /or primary care giver; all questions and concerns were addressed and care was aligned with patient's wishes.

## 2019-04-26 NOTE — PROGRESS NOTE ADULT - PROBLEM SELECTOR PLAN 8
IMPROVE VTE score: 4  Will manage with:  Heparin S/C    [ ] Previous VTE                                    3  [ ] Thrombophilia                                  2  [ x] Lower limb paralysis                        2  (unable to hold up >15 seconds)    [ ] Current Cancer (within 6 months)        2   [x] Immobilization > 24 hrs                    1  [ ] ICU/CCU stay > 24 hrs                      1  [x] Age > 60                                         1 History of GERD on Protonix   - C/W Home medications

## 2019-04-26 NOTE — PROGRESS NOTE ADULT - SUBJECTIVE AND OBJECTIVE BOX
Patient is a 78y old  Female who presents with a chief complaint of weakness/fall/ARF/candida esophagitis/dyaphagia, F/C inserted IV hydration, and IV ABS, BUN/CR significant improved, renal follow up, PT eval, renal sonogram      INTERVAL HPI/OVERNIGHT EVENTS:  T(C): 36.8 (19 @ 07:44), Max: 36.8 (19 @ 23:45)  HR: 85 (19 @ 07:44) (68 - 95)  BP: 113/58 (19 @ 07:44) (86/52 - 150/109)  RR: 18 (19 @ 07:44) (16 - 20)  SpO2: 98% (19 @ 07:44) (96% - 100%)  Wt(kg): --    LABS:                        11.4   6.03  )-----------( 159      ( 2019 07:16 )             35.9         142  |  100  |  99<H>  ----------------------------<  137<H>  3.4<L>   |  34<H>  |  1.99<H>    Ca    9.3      2019 07:16  Phos  3.4       Mg     2.2         TPro  7.1  /  Alb  3.4<L>  /  TBili  0.7  /  DBili  x   /  AST  13  /  ALT  18  /  AlkPhos  80      PT/INR - ( 2019 08:44 )   PT: 12.9 sec;   INR: 1.16 ratio           Urinalysis Basic - ( 2019 11:26 )    Color: Yellow / Appearance: Slightly Turbid / S.010 / pH: x  Gluc: x / Ketone: Trace  / Bili: Negative / Urobili: Negative   Blood: x / Protein: 30 mg/dL / Nitrite: Negative   Leuk Esterase: Moderate / RBC: 10-25 /HPF / WBC 11-25 /HPF   Sq Epi: x / Non Sq Epi: Moderate /HPF / Bacteria: Few /HPF      CAPILLARY BLOOD GLUCOSE      POCT Blood Glucose.: 165 mg/dL (2019 07:59)  POCT Blood Glucose.: 168 mg/dL (2019 22:48)  POCT Blood Glucose.: 187 mg/dL (2019 16:35)        RADIOLOGY & ADDITIONAL TESTS:  < from: CT Chest No Cont (19 @ 10:28) >  IMPRESSION:    When compared with chest x-ray of same date    Patchy areas of consolidation in the right upper lobe correspond to the   abnormality seen on recent chest x-ray. The differential for this   includes infection, sarcoidor malignancy. Follow-up chest CT recommended   in 4-6 weeks to assess for change.    < end of copied text >    Consultant(s) Notes Reviewed:  [x ] YES  [ ] NO    PHYSICAL EXAM:  GENERAL: well built, well nourished  HEAD:  Atraumatic, Normocephalic  EYES: EOMI, PERRLA, conjunctiva and sclera clear  ENT: No tonsillar erythema, exudates, or enlargement; Moist mucous membranes, Good dentition, No lesions, oral thrush improving  NECK: Supple, No JVD, Normal thyroid, no enlarged nodes  NERVOUS SYSTEM:  Alert & Oriented X3, Good concentration; Motor Strength 5/5 B/L upper and lower extremities; DTRs 2+ intact and symmetric, sensory intact  CHEST/LUNG: B/L good air entry; No rales, rhonchi, or wheezing  HEART: S1S2 normal, no S3, Regular rate and rhythm; No murmurs, rubs, or gallops  ABDOMEN: Soft, Nontender, Nondistended; Bowel sounds present  EXTREMITIES:  2+ Peripheral Pulses, No clubbing, cyanosis, mild edema  LYMPH: No lymphadenopathy noted  SKIN: No rashes or lesions    Care Discussed with Consultants/Other Providers [ x] YES  [ ] NO

## 2019-04-26 NOTE — PROGRESS NOTE ADULT - PROBLEM SELECTOR PLAN 6
-Patient takes at home: Metformin 500 BID  -Hold oral hypoglycemics in the setting of Renal Failure  -Continue with Insulin Sliding Scale History of CAD on Aspirin  C/W Aspirin

## 2019-04-27 LAB
ANION GAP SERPL CALC-SCNC: 8 MMOL/L — SIGNIFICANT CHANGE UP (ref 5–17)
BUN SERPL-MCNC: 70 MG/DL — HIGH (ref 7–18)
CALCIUM SERPL-MCNC: 9.9 MG/DL — SIGNIFICANT CHANGE UP (ref 8.4–10.5)
CHLORIDE SERPL-SCNC: 103 MMOL/L — SIGNIFICANT CHANGE UP (ref 96–108)
CO2 SERPL-SCNC: 34 MMOL/L — HIGH (ref 22–31)
CREAT SERPL-MCNC: 1.56 MG/DL — HIGH (ref 0.5–1.3)
CULTURE RESULTS: NO GROWTH — SIGNIFICANT CHANGE UP
GLUCOSE BLDC GLUCOMTR-MCNC: 158 MG/DL — HIGH (ref 70–99)
GLUCOSE BLDC GLUCOMTR-MCNC: 180 MG/DL — HIGH (ref 70–99)
GLUCOSE BLDC GLUCOMTR-MCNC: 238 MG/DL — HIGH (ref 70–99)
GLUCOSE BLDC GLUCOMTR-MCNC: 284 MG/DL — HIGH (ref 70–99)
GLUCOSE SERPL-MCNC: 134 MG/DL — HIGH (ref 70–99)
HCT VFR BLD CALC: 37.9 % — SIGNIFICANT CHANGE UP (ref 34.5–45)
HGB BLD-MCNC: 11.8 G/DL — SIGNIFICANT CHANGE UP (ref 11.5–15.5)
MAGNESIUM SERPL-MCNC: 2.2 MG/DL — SIGNIFICANT CHANGE UP (ref 1.6–2.6)
MCHC RBC-ENTMCNC: 29.6 PG — SIGNIFICANT CHANGE UP (ref 27–34)
MCHC RBC-ENTMCNC: 31.1 GM/DL — LOW (ref 32–36)
MCV RBC AUTO: 95.2 FL — SIGNIFICANT CHANGE UP (ref 80–100)
NRBC # BLD: 0 /100 WBCS — SIGNIFICANT CHANGE UP (ref 0–0)
PHOSPHATE SERPL-MCNC: 2.5 MG/DL — SIGNIFICANT CHANGE UP (ref 2.5–4.5)
PLATELET # BLD AUTO: 176 K/UL — SIGNIFICANT CHANGE UP (ref 150–400)
POTASSIUM SERPL-MCNC: 3.2 MMOL/L — LOW (ref 3.5–5.3)
POTASSIUM SERPL-SCNC: 3.2 MMOL/L — LOW (ref 3.5–5.3)
RBC # BLD: 3.98 M/UL — SIGNIFICANT CHANGE UP (ref 3.8–5.2)
RBC # FLD: 14.8 % — HIGH (ref 10.3–14.5)
SODIUM SERPL-SCNC: 145 MMOL/L — SIGNIFICANT CHANGE UP (ref 135–145)
SPECIMEN SOURCE: SIGNIFICANT CHANGE UP
WBC # BLD: 7.05 K/UL — SIGNIFICANT CHANGE UP (ref 3.8–10.5)
WBC # FLD AUTO: 7.05 K/UL — SIGNIFICANT CHANGE UP (ref 3.8–10.5)

## 2019-04-27 PROCEDURE — 76775 US EXAM ABDO BACK WALL LIM: CPT | Mod: 26

## 2019-04-27 RX ORDER — IPRATROPIUM/ALBUTEROL SULFATE 18-103MCG
3 AEROSOL WITH ADAPTER (GRAM) INHALATION EVERY 6 HOURS
Qty: 0 | Refills: 0 | Status: DISCONTINUED | OUTPATIENT
Start: 2019-04-27 | End: 2019-04-28

## 2019-04-27 RX ADMIN — Medication 500000 UNIT(S): at 12:07

## 2019-04-27 RX ADMIN — Medication 1: at 22:03

## 2019-04-27 RX ADMIN — TIOTROPIUM BROMIDE 1 CAPSULE(S): 18 CAPSULE ORAL; RESPIRATORY (INHALATION) at 12:07

## 2019-04-27 RX ADMIN — Medication 500000 UNIT(S): at 17:29

## 2019-04-27 RX ADMIN — AZITHROMYCIN 500 MILLIGRAM(S): 500 TABLET, FILM COATED ORAL at 12:07

## 2019-04-27 RX ADMIN — NYSTATIN CREAM 1 APPLICATION(S): 100000 CREAM TOPICAL at 06:16

## 2019-04-27 RX ADMIN — NYSTATIN CREAM 1 APPLICATION(S): 100000 CREAM TOPICAL at 17:29

## 2019-04-27 RX ADMIN — BUDESONIDE AND FORMOTEROL FUMARATE DIHYDRATE 2 PUFF(S): 160; 4.5 AEROSOL RESPIRATORY (INHALATION) at 12:07

## 2019-04-27 RX ADMIN — FLUCONAZOLE 50 MILLIGRAM(S): 150 TABLET ORAL at 12:07

## 2019-04-27 RX ADMIN — Medication 1 TABLET(S): at 12:07

## 2019-04-27 RX ADMIN — Medication 500000 UNIT(S): at 06:18

## 2019-04-27 RX ADMIN — Medication 325 MILLIGRAM(S): at 12:07

## 2019-04-27 RX ADMIN — Medication 1: at 08:25

## 2019-04-27 RX ADMIN — HEPARIN SODIUM 5000 UNIT(S): 5000 INJECTION INTRAVENOUS; SUBCUTANEOUS at 21:52

## 2019-04-27 RX ADMIN — PANTOPRAZOLE SODIUM 40 MILLIGRAM(S): 20 TABLET, DELAYED RELEASE ORAL at 06:15

## 2019-04-27 RX ADMIN — HEPARIN SODIUM 5000 UNIT(S): 5000 INJECTION INTRAVENOUS; SUBCUTANEOUS at 06:15

## 2019-04-27 RX ADMIN — Medication 3: at 17:28

## 2019-04-27 RX ADMIN — HEPARIN SODIUM 5000 UNIT(S): 5000 INJECTION INTRAVENOUS; SUBCUTANEOUS at 13:42

## 2019-04-27 RX ADMIN — Medication 3: at 12:06

## 2019-04-27 RX ADMIN — Medication 500000 UNIT(S): at 01:13

## 2019-04-27 RX ADMIN — CEFTRIAXONE 100 GRAM(S): 500 INJECTION, POWDER, FOR SOLUTION INTRAMUSCULAR; INTRAVENOUS at 14:36

## 2019-04-27 RX ADMIN — Medication 20 MILLIGRAM(S): at 06:15

## 2019-04-27 RX ADMIN — BUDESONIDE AND FORMOTEROL FUMARATE DIHYDRATE 2 PUFF(S): 160; 4.5 AEROSOL RESPIRATORY (INHALATION) at 21:53

## 2019-04-27 NOTE — PROGRESS NOTE ADULT - ASSESSMENT
78 yr old female from home , H/O HTN/COPD/obesity/CAD/DM/lower back pain, admit hospital for weakness last few weeks and very poor po intake, worsening last 2 days, due to sore throat, S/P fall at home unable to get up for 2-3 hrs, in ER, lab showed acute renal failure, CT chest possible consolidation vs sarcoid, IV hydration, IV ABS, renal sonogram, moniotr BMP daily for 3 days, F/C insertion, renal follow up, nystatin solution swish and swallow qid, po intake improved today, BUN/CR significant improved today, PT eval, HHN treatment and steroid for COPD, D/C planning.

## 2019-04-27 NOTE — PROGRESS NOTE ADULT - PROBLEM SELECTOR PLAN 1
-KYLE sec to a pre-renal state  and ATN from poor oral intake and UTI/pneumonia. Also was on ARB's and Lasix.  -Will hold ARB, Lasix and  metformin.   -She was given IV hydration, creatinine improved to 1.56 today  -Renal US pending.   -I & O

## 2019-04-27 NOTE — PROGRESS NOTE ADULT - PROBLEM SELECTOR PLAN 7
-Patient takes at home: Metformin 500 BID  -Hold oral hypoglycemics in the setting of Renal Failure  -Continue with Insulin Sliding Scale  -HbA1C- 8%

## 2019-04-27 NOTE — PROGRESS NOTE ADULT - SUBJECTIVE AND OBJECTIVE BOX
[facility] : Progress Note :: [phone], (office [phone]),   Dr Barone / Dr Fontanez / Dr Schreiber / Dr Koo / Dr Harvey HOOKER / Dr Mcnally / Dr Hernandez / Dr Steven yip  _____________________________________________________________________________________________  no events overnight. feels better.    No Known Drug Allergies  strawberry (Unknown)    Hospital Medications:   MEDICATIONS  (STANDING):  ALBUTerol/ipratropium for Nebulization. 3 milliLiter(s) Nebulizer every 6 hours  aspirin 325 milliGRAM(s) Oral daily  azithromycin   Tablet 500 milliGRAM(s) Oral daily  buDESOnide 160 MICROgram(s)/formoterol 4.5 MICROgram(s) Inhaler 2 Puff(s) Inhalation two times a day  cefTRIAXone   IVPB 1 Gram(s) IV Intermittent every 24 hours  fluconAZOLE IVPB 100 milliGRAM(s) IV Intermittent every 24 hours  heparin  Injectable 5000 Unit(s) SubCutaneous every 8 hours  insulin lispro (HumaLOG) corrective regimen sliding scale   SubCutaneous Before meals and at bedtime  multivitamin/minerals 1 Tablet(s) Oral daily  nystatin    Suspension 373964 Unit(s) Oral four times a day  nystatin Powder 1 Application(s) Topical two times a day  pantoprazole    Tablet 40 milliGRAM(s) Oral before breakfast  predniSONE   Tablet 20 milliGRAM(s) Oral daily  tiotropium 18 MICROgram(s) Capsule 1 Capsule(s) Inhalation daily        VITALS:  T(F): 97.7 (19 @ 15:32), Max: 98.4 (19 @ 11:23)  HR: 100 (19 @ 15:32)  BP: 118/89 (19 @ 15:32)  RR: 19 (19 @ 15:32)  SpO2: 97% (19 @ 15:32)  Wt(kg): --     @ 07: @ 07:00  --------------------------------------------------------  IN: 318 mL / OUT: 1810 mL / NET: -1492 mL     @ 07: @ 18:33  --------------------------------------------------------  IN: 0 mL / OUT: 1100 mL / NET: -1100 mL        PHYSICAL EXAM:  Constitutional: NAD  HEENT: anicteric sclera, oropharynx clear.  Neck: No JVD  Respiratory: CTAB, no wheezes, rales or rhonchi  Cardiovascular: S1, S2, RRR  Gastrointestinal: BS+, soft, NT/ND  Extremities:  No peripheral edema  Neurological: A/O x 3, no focal deficits  Psychiatric: Normal mood, normal affect  :  osborne.       LABS:      145  |  103  |  70<H>  ----------------------------<  134<H>  3.2<L>   |  34<H>  |  1.56<H>    Ca    9.9      2019 07:26  Phos  2.5       Mg     2.2           Creatinine Trend: 1.56 <--, 1.99 <--, 4.16 <--                        11.8   7.05  )-----------( 176      ( 2019 07:26 )             37.9     Urine Studies:  Urinalysis Basic - ( 2019 11:26 )    Color: Yellow / Appearance: Slightly Turbid / S.010 / pH:   Gluc:  / Ketone: Trace  / Bili: Negative / Urobili: Negative   Blood:  / Protein: 30 mg/dL / Nitrite: Negative   Leuk Esterase: Moderate / RBC: 10-25 /HPF / WBC 11-25 /HPF   Sq Epi:  / Non Sq Epi: Moderate /HPF / Bacteria: Few /HPF      Osmolality, Random Urine: 401 mos/kg ( @ 15:31)  Creatinine, Random Urine: 53 mg/dL ( @ 15:31)  Sodium, Random Urine: 45 mmol/L ( @ 15:31)    RADIOLOGY & ADDITIONAL STUDIES:

## 2019-04-27 NOTE — PROGRESS NOTE ADULT - SUBJECTIVE AND OBJECTIVE BOX
Patient is a 78y old  Female who presents with a chief complaint of weakness/ARF/fall/candida esophagitis, on IV hydration, and treatment of fungals, improved      INTERVAL HPI/OVERNIGHT EVENTS:  T(C): 36.9 (04-27-19 @ 11:23), Max: 36.9 (04-27-19 @ 11:23)  HR: 94 (04-27-19 @ 11:23) (81 - 94)  BP: 107/61 (04-27-19 @ 11:23) (107/61 - 115/73)  RR: 18 (04-27-19 @ 11:23) (18 - 18)  SpO2: 100% (04-27-19 @ 11:23) (100% - 100%)  Wt(kg): --    LABS:                        11.8   7.05  )-----------( 176      ( 27 Apr 2019 07:26 )             37.9     04-27    145  |  103  |  70<H>  ----------------------------<  134<H>  3.2<L>   |  34<H>  |  1.56<H>    Ca    9.9      27 Apr 2019 07:26  Phos  2.5     04-27  Mg     2.2     04-27          CAPILLARY BLOOD GLUCOSE      POCT Blood Glucose.: 284 mg/dL (27 Apr 2019 11:48)  POCT Blood Glucose.: 158 mg/dL (27 Apr 2019 08:04)  POCT Blood Glucose.: 148 mg/dL (26 Apr 2019 20:54)  POCT Blood Glucose.: 245 mg/dL (26 Apr 2019 16:34)        RADIOLOGY & ADDITIONAL TESTS:    Consultant(s) Notes Reviewed:  [x ] YES  [ ] NO    PHYSICAL EXAM:  GENERAL: well built, well nourished  HEAD:  Atraumatic, Normocephalic  EYES: EOMI, PERRLA, conjunctiva and sclera clear  ENT: No tonsillar erythema, exudates, or enlargement; Moist mucous membranes, Good dentition, No lesions  NECK: Supple, No JVD, Normal thyroid, no enlarged nodes  NERVOUS SYSTEM:  Alert & Oriented X3, Good concentration; Motor Strength 5/5 B/L upper and lower extremities; DTRs 2+ intact and symmetric, sensory intact  CHEST/LUNG: B/L good air entry; No rales, rhonchi, positive wheezing  HEART: S1S2 normal, no S3, Regular rate and rhythm; No murmurs, rubs, or gallops  ABDOMEN: Soft, Nontender, Nondistended; Bowel sounds present  EXTREMITIES:  2+ Peripheral Pulses, No clubbing, cyanosis, or edema  LYMPH: No lymphadenopathy noted  SKIN: No rashes or lesions    Care Discussed with Consultants/Other Providers [ x] YES  [ ] NO

## 2019-04-27 NOTE — PROGRESS NOTE ADULT - SUBJECTIVE AND OBJECTIVE BOX
PGY 1 Note discussed with supervising resident and primary attending    Patient is a 78y old  Female who presents with a chief complaint of Renal Failure/fall (27 Apr 2019 14:33)      INTERVAL HPI/OVERNIGHT EVENTS: no events noted overnight.    MEDICATIONS  (STANDING):  ALBUTerol/ipratropium for Nebulization. 3 milliLiter(s) Nebulizer every 6 hours  aspirin 325 milliGRAM(s) Oral daily  azithromycin   Tablet 500 milliGRAM(s) Oral daily  buDESOnide 160 MICROgram(s)/formoterol 4.5 MICROgram(s) Inhaler 2 Puff(s) Inhalation two times a day  cefTRIAXone   IVPB 1 Gram(s) IV Intermittent every 24 hours  fluconAZOLE IVPB 100 milliGRAM(s) IV Intermittent every 24 hours  heparin  Injectable 5000 Unit(s) SubCutaneous every 8 hours  insulin lispro (HumaLOG) corrective regimen sliding scale   SubCutaneous Before meals and at bedtime  multivitamin/minerals 1 Tablet(s) Oral daily  nystatin    Suspension 251485 Unit(s) Oral four times a day  nystatin Powder 1 Application(s) Topical two times a day  pantoprazole    Tablet 40 milliGRAM(s) Oral before breakfast  predniSONE   Tablet 20 milliGRAM(s) Oral daily  tiotropium 18 MICROgram(s) Capsule 1 Capsule(s) Inhalation daily    MEDICATIONS  (PRN):  ALBUTerol    90 MICROgram(s) HFA Inhaler 2 Puff(s) Inhalation every 6 hours PRN Bronchospasm      __________________________________________________  REVIEW OF SYSTEMS:    CONSTITUTIONAL: No fever,   EYES: no acute visual disturbances  NECK: No pain or stiffness  RESPIRATORY: No cough; No shortness of breath  CARDIOVASCULAR: No chest pain, no palpitations  GASTROINTESTINAL: No pain. No nausea or vomiting; No diarrhea   NEUROLOGICAL: No headache or numbness, no tremors  MUSCULOSKELETAL: No joint pain, no muscle pain  GENITOURINARY: no dysuria, no frequency, no hesitancy  PSYCHIATRY: no depression , no anxiety  ALL OTHER  ROS negative        Vital Signs Last 24 Hrs  T(C): 36.9 (27 Apr 2019 11:23), Max: 36.9 (27 Apr 2019 11:23)  T(F): 98.4 (27 Apr 2019 11:23), Max: 98.4 (27 Apr 2019 11:23)  HR: 94 (27 Apr 2019 11:23) (81 - 94)  BP: 107/61 (27 Apr 2019 11:23) (107/61 - 115/73)  BP(mean): --  RR: 18 (27 Apr 2019 11:23) (18 - 18)  SpO2: 100% (27 Apr 2019 11:23) (100% - 100%)    ________________________________________________  PHYSICAL EXAM:  GENERAL: NAD  HEENT: Normocephalic;  conjunctivae and sclerae clear; moist mucous membranes;   NECK : supple  CHEST/LUNG: Clear to auscultation bilaterally with good air entry   HEART: S1 S2  regular; no murmurs, gallops or rubs  ABDOMEN: Soft, Nontender, Nondistended; Bowel sounds present  EXTREMITIES: no cyanosis; no edema; no calf tenderness  SKIN: warm and dry; no rash  NERVOUS SYSTEM:  Awake and alert; Oriented  to place, person and time ; no new deficits    _________________________________________________  LABS:                        11.8   7.05  )-----------( 176      ( 27 Apr 2019 07:26 )             37.9     04-27    145  |  103  |  70<H>  ----------------------------<  134<H>  3.2<L>   |  34<H>  |  1.56<H>    Ca    9.9      27 Apr 2019 07:26  Phos  2.5     04-27  Mg     2.2     04-27          CAPILLARY BLOOD GLUCOSE      POCT Blood Glucose.: 284 mg/dL (27 Apr 2019 11:48)  POCT Blood Glucose.: 158 mg/dL (27 Apr 2019 08:04)  POCT Blood Glucose.: 148 mg/dL (26 Apr 2019 20:54)  POCT Blood Glucose.: 245 mg/dL (26 Apr 2019 16:34)        RADIOLOGY & ADDITIONAL TESTS:    Imaging Personally Reviewed:  YES    Consultant(s) Notes Reviewed:   YES    Care Discussed with Consultants : YES     Plan of care was discussed with patient and /or primary care giver; all questions and concerns were addressed and care was aligned with patient's wishes.

## 2019-04-27 NOTE — PROGRESS NOTE ADULT - PROBLEM SELECTOR PLAN 5
History of CHF on Lasix 20mg daily, Entresto and Olmesartan  Will hold medication in the setting of KYLE  -Will re-start once cleared for nephrology.

## 2019-04-27 NOTE — PROGRESS NOTE ADULT - ASSESSMENT
79 y/o old female from home with PMHx of HTN, COPD on home oxygen 4 liters , obesity, ,CAD, Aortic Valve replacement only on full dose aspirin DM, lower back pain who was brought to the hospital by daughter due to  weakness last few weeks and very poor oral intake, worsening last 2 days, due to sore throat. Family also report that her knees gave in and she slid down to the floor, no trauma or LOC, chest pain or palpitation. Unable to get up for 2-3 hrs. Denies nausea, vomiting, diarrhea, abdominal pain, SOB, chest pain, FREEMAN, palpitations, dizziness, headache, cough, wheezing, joint pain or swelling, fever, chills.     ER work up was done . Labs showing acute renal failure, CT chest was done showing possible consolidation. Discussed with family members she has history of pulmonary infiltrates on the right side.(NOT A NEW FINDING, will continue to follow up as outpatient)    Patient is admitted for KYLE.

## 2019-04-27 NOTE — PROGRESS NOTE ADULT - ASSESSMENT
# KYLE sec to a pre-renal state sec to a pre-renal state and ATN from poor oral intake and UTI/pneumonia. Also was on ARB's and lasix.on hold. S/p  IVF.  renal function much improved and brisk urine output  renal sono with no obstruction.    recs:  D/C  osborne  cont ABX for PNA and UTI  cont to avoid NSAID's and IV contrast.  rpt BMP in AM

## 2019-04-28 LAB
ANION GAP SERPL CALC-SCNC: 8 MMOL/L — SIGNIFICANT CHANGE UP (ref 5–17)
BUN SERPL-MCNC: 51 MG/DL — HIGH (ref 7–18)
CALCIUM SERPL-MCNC: 10 MG/DL — SIGNIFICANT CHANGE UP (ref 8.4–10.5)
CHLORIDE SERPL-SCNC: 102 MMOL/L — SIGNIFICANT CHANGE UP (ref 96–108)
CO2 SERPL-SCNC: 33 MMOL/L — HIGH (ref 22–31)
CREAT SERPL-MCNC: 1.29 MG/DL — SIGNIFICANT CHANGE UP (ref 0.5–1.3)
GLUCOSE BLDC GLUCOMTR-MCNC: 180 MG/DL — HIGH (ref 70–99)
GLUCOSE BLDC GLUCOMTR-MCNC: 183 MG/DL — HIGH (ref 70–99)
GLUCOSE BLDC GLUCOMTR-MCNC: 192 MG/DL — HIGH (ref 70–99)
GLUCOSE BLDC GLUCOMTR-MCNC: 211 MG/DL — HIGH (ref 70–99)
GLUCOSE BLDC GLUCOMTR-MCNC: 294 MG/DL — HIGH (ref 70–99)
GLUCOSE SERPL-MCNC: 188 MG/DL — HIGH (ref 70–99)
HCT VFR BLD CALC: 35 % — SIGNIFICANT CHANGE UP (ref 34.5–45)
HGB BLD-MCNC: 11.2 G/DL — LOW (ref 11.5–15.5)
MCHC RBC-ENTMCNC: 30.2 PG — SIGNIFICANT CHANGE UP (ref 27–34)
MCHC RBC-ENTMCNC: 32 GM/DL — SIGNIFICANT CHANGE UP (ref 32–36)
MCV RBC AUTO: 94.3 FL — SIGNIFICANT CHANGE UP (ref 80–100)
NRBC # BLD: 0 /100 WBCS — SIGNIFICANT CHANGE UP (ref 0–0)
PLATELET # BLD AUTO: 180 K/UL — SIGNIFICANT CHANGE UP (ref 150–400)
POTASSIUM SERPL-MCNC: 3.3 MMOL/L — LOW (ref 3.5–5.3)
POTASSIUM SERPL-SCNC: 3.3 MMOL/L — LOW (ref 3.5–5.3)
RBC # BLD: 3.71 M/UL — LOW (ref 3.8–5.2)
RBC # FLD: 15 % — HIGH (ref 10.3–14.5)
SODIUM SERPL-SCNC: 143 MMOL/L — SIGNIFICANT CHANGE UP (ref 135–145)
WBC # BLD: 8.37 K/UL — SIGNIFICANT CHANGE UP (ref 3.8–10.5)
WBC # FLD AUTO: 8.37 K/UL — SIGNIFICANT CHANGE UP (ref 3.8–10.5)

## 2019-04-28 RX ORDER — IPRATROPIUM/ALBUTEROL SULFATE 18-103MCG
3 AEROSOL WITH ADAPTER (GRAM) INHALATION EVERY 6 HOURS
Qty: 0 | Refills: 0 | Status: DISCONTINUED | OUTPATIENT
Start: 2019-04-28 | End: 2019-04-29

## 2019-04-28 RX ADMIN — BUDESONIDE AND FORMOTEROL FUMARATE DIHYDRATE 2 PUFF(S): 160; 4.5 AEROSOL RESPIRATORY (INHALATION) at 11:58

## 2019-04-28 RX ADMIN — AZITHROMYCIN 500 MILLIGRAM(S): 500 TABLET, FILM COATED ORAL at 12:02

## 2019-04-28 RX ADMIN — BUDESONIDE AND FORMOTEROL FUMARATE DIHYDRATE 2 PUFF(S): 160; 4.5 AEROSOL RESPIRATORY (INHALATION) at 21:19

## 2019-04-28 RX ADMIN — FLUCONAZOLE 50 MILLIGRAM(S): 150 TABLET ORAL at 12:00

## 2019-04-28 RX ADMIN — HEPARIN SODIUM 5000 UNIT(S): 5000 INJECTION INTRAVENOUS; SUBCUTANEOUS at 21:18

## 2019-04-28 RX ADMIN — Medication 325 MILLIGRAM(S): at 12:02

## 2019-04-28 RX ADMIN — PANTOPRAZOLE SODIUM 40 MILLIGRAM(S): 20 TABLET, DELAYED RELEASE ORAL at 07:48

## 2019-04-28 RX ADMIN — Medication 1: at 17:47

## 2019-04-28 RX ADMIN — HEPARIN SODIUM 5000 UNIT(S): 5000 INJECTION INTRAVENOUS; SUBCUTANEOUS at 14:31

## 2019-04-28 RX ADMIN — Medication 500000 UNIT(S): at 05:27

## 2019-04-28 RX ADMIN — NYSTATIN CREAM 1 APPLICATION(S): 100000 CREAM TOPICAL at 05:27

## 2019-04-28 RX ADMIN — HEPARIN SODIUM 5000 UNIT(S): 5000 INJECTION INTRAVENOUS; SUBCUTANEOUS at 05:27

## 2019-04-28 RX ADMIN — NYSTATIN CREAM 1 APPLICATION(S): 100000 CREAM TOPICAL at 17:47

## 2019-04-28 RX ADMIN — Medication 3: at 12:00

## 2019-04-28 RX ADMIN — TIOTROPIUM BROMIDE 1 CAPSULE(S): 18 CAPSULE ORAL; RESPIRATORY (INHALATION) at 12:02

## 2019-04-28 RX ADMIN — Medication 500000 UNIT(S): at 12:01

## 2019-04-28 RX ADMIN — Medication 1: at 22:53

## 2019-04-28 RX ADMIN — Medication 1 TABLET(S): at 12:02

## 2019-04-28 RX ADMIN — Medication 1: at 08:31

## 2019-04-28 RX ADMIN — CEFTRIAXONE 100 GRAM(S): 500 INJECTION, POWDER, FOR SOLUTION INTRAMUSCULAR; INTRAVENOUS at 14:31

## 2019-04-28 RX ADMIN — Medication 20 MILLIGRAM(S): at 05:27

## 2019-04-28 RX ADMIN — Medication 500000 UNIT(S): at 17:46

## 2019-04-28 RX ADMIN — Medication 500000 UNIT(S): at 00:46

## 2019-04-28 NOTE — PROGRESS NOTE ADULT - SUBJECTIVE AND OBJECTIVE BOX
Patient is a 78y old  Female who presents with a chief complaint of waekness/fall/KYLE/candida esophagitis, improved, D/C planning to rehab      INTERVAL HPI/OVERNIGHT EVENTS:  T(C): 36.2 (04-28-19 @ 08:58), Max: 36.9 (04-27-19 @ 11:23)  HR: 111 (04-28-19 @ 08:58) (94 - 111)  BP: 139/93 (04-28-19 @ 08:58) (107/61 - 139/93)  RR: 18 (04-28-19 @ 08:58) (17 - 19)  SpO2: 93% (04-28-19 @ 08:58) (93% - 100%)  Wt(kg): --    LABS:                        11.2   8.37  )-----------( 180      ( 28 Apr 2019 07:42 )             35.0     04-28    143  |  102  |  51<H>  ----------------------------<  188<H>  3.3<L>   |  33<H>  |  1.29    Ca    10.0      28 Apr 2019 07:42  Phos  2.5     04-27  Mg     2.2     04-27          CAPILLARY BLOOD GLUCOSE      POCT Blood Glucose.: 192 mg/dL (28 Apr 2019 08:05)  POCT Blood Glucose.: 180 mg/dL (27 Apr 2019 21:21)  POCT Blood Glucose.: 238 mg/dL (27 Apr 2019 17:11)  POCT Blood Glucose.: 284 mg/dL (27 Apr 2019 11:48)        RADIOLOGY & ADDITIONAL TESTS:  < from: US Renal (04.27.19 @ 16:23) >  Impression: No hydronephrosis.    < end of copied text >    Consultant(s) Notes Reviewed:  [x ] YES  [ ] NO    PHYSICAL EXAM:  GENERAL: well built, well nourished  HEAD:  Atraumatic, Normocephalic  EYES: EOMI, PERRLA, conjunctiva and sclera clear  ENT: No tonsillar erythema, exudates, or enlargement; Moist mucous membranes, Good dentition, No lesions, oral thrush improved  NECK: Supple, No JVD, Normal thyroid, no enlarged nodes  NERVOUS SYSTEM:  Alert & Oriented X3, Good concentration; Motor Strength 5/5 B/L upper and lower extremities; DTRs 2+ intact and symmetric, sensory intact  CHEST/LUNG: B/L good air entry; No rales, rhonchi, or wheezing  HEART: S1S2 normal, no S3, Regular rate and rhythm; No murmurs, rubs, or gallops  ABDOMEN: Soft, Nontender, Nondistended; Bowel sounds present  EXTREMITIES:  2+ Peripheral Pulses, No clubbing, cyanosis, or edema  LYMPH: No lymphadenopathy noted  SKIN: No rashes or lesions    Care Discussed with Consultants/Other Providers [ x] YES  [ ] NO

## 2019-04-28 NOTE — PROGRESS NOTE ADULT - ASSESSMENT
78 yr old female from home , H/O HTN/COPD/obesity/CAD/DM/lower back pain, admit hospital for weakness last few weeks and very poor po intake, worsening last 2 days, due to sore throat, S/P fall at home unable to get up for 2-3 hrs, in ER, lab showed acute renal failure, CT chest possible consolidation vs sarcoid, IV hydration, IV ABS, renal sonogram, moniotr BMP daily for 3 days, F/C insertion, renal follow up, nystatin solution swish and swallow qid, IV diflucan,  po intake improved today, BUN/CR significant improved, PT eval, HHN treatment and steroid for COPD, D/C planning to rehab.

## 2019-04-29 ENCOUNTER — TRANSCRIPTION ENCOUNTER (OUTPATIENT)
Age: 79
End: 2019-04-29

## 2019-04-29 VITALS
HEART RATE: 102 BPM | RESPIRATION RATE: 18 BRPM | SYSTOLIC BLOOD PRESSURE: 131 MMHG | DIASTOLIC BLOOD PRESSURE: 91 MMHG | TEMPERATURE: 98 F | OXYGEN SATURATION: 98 %

## 2019-04-29 LAB
ANION GAP SERPL CALC-SCNC: 9 MMOL/L — SIGNIFICANT CHANGE UP (ref 5–17)
BUN SERPL-MCNC: 46 MG/DL — HIGH (ref 7–18)
CALCIUM SERPL-MCNC: 9.9 MG/DL — SIGNIFICANT CHANGE UP (ref 8.4–10.5)
CHLORIDE SERPL-SCNC: 100 MMOL/L — SIGNIFICANT CHANGE UP (ref 96–108)
CO2 SERPL-SCNC: 31 MMOL/L — SIGNIFICANT CHANGE UP (ref 22–31)
CREAT SERPL-MCNC: 1.44 MG/DL — HIGH (ref 0.5–1.3)
GLUCOSE BLDC GLUCOMTR-MCNC: 172 MG/DL — HIGH (ref 70–99)
GLUCOSE BLDC GLUCOMTR-MCNC: 236 MG/DL — HIGH (ref 70–99)
GLUCOSE BLDC GLUCOMTR-MCNC: 278 MG/DL — HIGH (ref 70–99)
GLUCOSE BLDC GLUCOMTR-MCNC: 288 MG/DL — HIGH (ref 70–99)
GLUCOSE BLDC GLUCOMTR-MCNC: 293 MG/DL — HIGH (ref 70–99)
GLUCOSE SERPL-MCNC: 321 MG/DL — HIGH (ref 70–99)
HCT VFR BLD CALC: 34.8 % — SIGNIFICANT CHANGE UP (ref 34.5–45)
HGB BLD-MCNC: 10.9 G/DL — LOW (ref 11.5–15.5)
MAGNESIUM SERPL-MCNC: 1.8 MG/DL — SIGNIFICANT CHANGE UP (ref 1.6–2.6)
MCHC RBC-ENTMCNC: 29.9 PG — SIGNIFICANT CHANGE UP (ref 27–34)
MCHC RBC-ENTMCNC: 31.3 GM/DL — LOW (ref 32–36)
MCV RBC AUTO: 95.3 FL — SIGNIFICANT CHANGE UP (ref 80–100)
NRBC # BLD: 0 /100 WBCS — SIGNIFICANT CHANGE UP (ref 0–0)
PHOSPHATE SERPL-MCNC: 2.2 MG/DL — LOW (ref 2.5–4.5)
PLATELET # BLD AUTO: 185 K/UL — SIGNIFICANT CHANGE UP (ref 150–400)
POTASSIUM SERPL-MCNC: 3.5 MMOL/L — SIGNIFICANT CHANGE UP (ref 3.5–5.3)
POTASSIUM SERPL-SCNC: 3.5 MMOL/L — SIGNIFICANT CHANGE UP (ref 3.5–5.3)
RBC # BLD: 3.65 M/UL — LOW (ref 3.8–5.2)
RBC # FLD: 15.1 % — HIGH (ref 10.3–14.5)
SODIUM SERPL-SCNC: 140 MMOL/L — SIGNIFICANT CHANGE UP (ref 135–145)
WBC # BLD: 9.04 K/UL — SIGNIFICANT CHANGE UP (ref 3.8–10.5)
WBC # FLD AUTO: 9.04 K/UL — SIGNIFICANT CHANGE UP (ref 3.8–10.5)

## 2019-04-29 PROCEDURE — 84100 ASSAY OF PHOSPHORUS: CPT

## 2019-04-29 PROCEDURE — 82962 GLUCOSE BLOOD TEST: CPT

## 2019-04-29 PROCEDURE — 97162 PT EVAL MOD COMPLEX 30 MIN: CPT

## 2019-04-29 PROCEDURE — 83690 ASSAY OF LIPASE: CPT

## 2019-04-29 PROCEDURE — 82607 VITAMIN B-12: CPT

## 2019-04-29 PROCEDURE — 36415 COLL VENOUS BLD VENIPUNCTURE: CPT

## 2019-04-29 PROCEDURE — 80048 BASIC METABOLIC PNL TOTAL CA: CPT

## 2019-04-29 PROCEDURE — 84540 ASSAY OF URINE/UREA-N: CPT

## 2019-04-29 PROCEDURE — 84484 ASSAY OF TROPONIN QUANT: CPT

## 2019-04-29 PROCEDURE — 82746 ASSAY OF FOLIC ACID SERUM: CPT

## 2019-04-29 PROCEDURE — 93005 ELECTROCARDIOGRAM TRACING: CPT

## 2019-04-29 PROCEDURE — 76775 US EXAM ABDO BACK WALL LIM: CPT

## 2019-04-29 PROCEDURE — 72100 X-RAY EXAM L-S SPINE 2/3 VWS: CPT | Mod: 26

## 2019-04-29 PROCEDURE — 83036 HEMOGLOBIN GLYCOSYLATED A1C: CPT

## 2019-04-29 PROCEDURE — 83874 ASSAY OF MYOGLOBIN: CPT

## 2019-04-29 PROCEDURE — 96374 THER/PROPH/DIAG INJ IV PUSH: CPT

## 2019-04-29 PROCEDURE — 72100 X-RAY EXAM L-S SPINE 2/3 VWS: CPT

## 2019-04-29 PROCEDURE — 85027 COMPLETE CBC AUTOMATED: CPT

## 2019-04-29 PROCEDURE — 83605 ASSAY OF LACTIC ACID: CPT

## 2019-04-29 PROCEDURE — 71045 X-RAY EXAM CHEST 1 VIEW: CPT

## 2019-04-29 PROCEDURE — 99285 EMERGENCY DEPT VISIT HI MDM: CPT | Mod: 25

## 2019-04-29 PROCEDURE — 81001 URINALYSIS AUTO W/SCOPE: CPT

## 2019-04-29 PROCEDURE — 82550 ASSAY OF CK (CPK): CPT

## 2019-04-29 PROCEDURE — 72170 X-RAY EXAM OF PELVIS: CPT

## 2019-04-29 PROCEDURE — 84443 ASSAY THYROID STIM HORMONE: CPT

## 2019-04-29 PROCEDURE — 80053 COMPREHEN METABOLIC PANEL: CPT

## 2019-04-29 PROCEDURE — 82570 ASSAY OF URINE CREATININE: CPT

## 2019-04-29 PROCEDURE — 71250 CT THORAX DX C-: CPT

## 2019-04-29 PROCEDURE — 84300 ASSAY OF URINE SODIUM: CPT

## 2019-04-29 PROCEDURE — 83935 ASSAY OF URINE OSMOLALITY: CPT

## 2019-04-29 PROCEDURE — 94640 AIRWAY INHALATION TREATMENT: CPT

## 2019-04-29 PROCEDURE — 85610 PROTHROMBIN TIME: CPT

## 2019-04-29 PROCEDURE — 83735 ASSAY OF MAGNESIUM: CPT

## 2019-04-29 PROCEDURE — 87086 URINE CULTURE/COLONY COUNT: CPT

## 2019-04-29 RX ORDER — AZITHROMYCIN 500 MG/1
0 TABLET, FILM COATED ORAL
Qty: 0 | Refills: 0 | COMMUNITY

## 2019-04-29 RX ORDER — FUROSEMIDE 40 MG
0 TABLET ORAL
Qty: 0 | Refills: 0 | COMMUNITY

## 2019-04-29 RX ORDER — ASPIRIN/CALCIUM CARB/MAGNESIUM 324 MG
0 TABLET ORAL
Qty: 0 | Refills: 0 | COMMUNITY

## 2019-04-29 RX ORDER — LACTULOSE 10 G/15ML
45 SOLUTION ORAL
Qty: 0 | Refills: 0 | DISCHARGE
Start: 2019-04-29

## 2019-04-29 RX ORDER — NYSTATIN 500MM UNIT
5 POWDER (EA) MISCELLANEOUS
Qty: 250 | Refills: 0
Start: 2019-04-29 | End: 2019-05-05

## 2019-04-29 RX ORDER — SACUBITRIL AND VALSARTAN 24; 26 MG/1; MG/1
1 TABLET, FILM COATED ORAL
Qty: 0 | Refills: 0 | COMMUNITY

## 2019-04-29 RX ORDER — OLMESARTAN MEDOXOMIL 5 MG/1
0 TABLET, FILM COATED ORAL
Qty: 0 | Refills: 0 | COMMUNITY

## 2019-04-29 RX ORDER — LACTULOSE 10 G/15ML
30 SOLUTION ORAL DAILY
Qty: 0 | Refills: 0 | Status: DISCONTINUED | OUTPATIENT
Start: 2019-04-29 | End: 2019-04-29

## 2019-04-29 RX ORDER — AZITHROMYCIN 500 MG/1
1 TABLET, FILM COATED ORAL
Qty: 0 | Refills: 0 | COMMUNITY

## 2019-04-29 RX ORDER — BUMETANIDE 0.25 MG/ML
1 INJECTION INTRAMUSCULAR; INTRAVENOUS
Qty: 0 | Refills: 0 | COMMUNITY

## 2019-04-29 RX ADMIN — HEPARIN SODIUM 5000 UNIT(S): 5000 INJECTION INTRAVENOUS; SUBCUTANEOUS at 15:29

## 2019-04-29 RX ADMIN — Medication 20 MILLIGRAM(S): at 06:18

## 2019-04-29 RX ADMIN — PANTOPRAZOLE SODIUM 40 MILLIGRAM(S): 20 TABLET, DELAYED RELEASE ORAL at 06:18

## 2019-04-29 RX ADMIN — TIOTROPIUM BROMIDE 1 CAPSULE(S): 18 CAPSULE ORAL; RESPIRATORY (INHALATION) at 11:46

## 2019-04-29 RX ADMIN — Medication 500000 UNIT(S): at 00:17

## 2019-04-29 RX ADMIN — AZITHROMYCIN 500 MILLIGRAM(S): 500 TABLET, FILM COATED ORAL at 11:46

## 2019-04-29 RX ADMIN — Medication 1 TABLET(S): at 11:46

## 2019-04-29 RX ADMIN — FLUCONAZOLE 50 MILLIGRAM(S): 150 TABLET ORAL at 11:45

## 2019-04-29 RX ADMIN — Medication 500000 UNIT(S): at 06:19

## 2019-04-29 RX ADMIN — BUDESONIDE AND FORMOTEROL FUMARATE DIHYDRATE 2 PUFF(S): 160; 4.5 AEROSOL RESPIRATORY (INHALATION) at 11:47

## 2019-04-29 RX ADMIN — Medication 3: at 18:09

## 2019-04-29 RX ADMIN — NYSTATIN CREAM 1 APPLICATION(S): 100000 CREAM TOPICAL at 06:18

## 2019-04-29 RX ADMIN — Medication 2: at 11:47

## 2019-04-29 RX ADMIN — Medication 1: at 08:49

## 2019-04-29 RX ADMIN — Medication 325 MILLIGRAM(S): at 11:46

## 2019-04-29 RX ADMIN — LACTULOSE 30 GRAM(S): 10 SOLUTION ORAL at 15:33

## 2019-04-29 RX ADMIN — Medication 500000 UNIT(S): at 17:46

## 2019-04-29 RX ADMIN — CEFTRIAXONE 100 GRAM(S): 500 INJECTION, POWDER, FOR SOLUTION INTRAMUSCULAR; INTRAVENOUS at 15:28

## 2019-04-29 RX ADMIN — Medication 500000 UNIT(S): at 11:46

## 2019-04-29 RX ADMIN — HEPARIN SODIUM 5000 UNIT(S): 5000 INJECTION INTRAVENOUS; SUBCUTANEOUS at 06:19

## 2019-04-29 RX ADMIN — NYSTATIN CREAM 1 APPLICATION(S): 100000 CREAM TOPICAL at 17:44

## 2019-04-29 NOTE — PROGRESS NOTE ADULT - PROBLEM SELECTOR PLAN 1
-KYLE sec to a pre-renal state  and ATN from poor oral intake and UTI/pneumonia.   -Will hold ARB, Lasix and  metformin.   -She was given IV hydration, creatinine improved.  -Renal US- no hydronephrosis  -I & O  -DC planning- LISETTE once stable

## 2019-04-29 NOTE — DISCHARGE NOTE PROVIDER - HOSPITAL COURSE
77 y/o old female from home with PMHx of HTN, COPD on home oxygen 4 liters , obesity, ,CAD, Aortic Valve replacement only on full dose aspirin DM, lower back pain who was brought to the hospital by daughter due to  weakness last few weeks and very poor oral intake, worsening last 2 days, due to sore throat. Family also report that her knees gave in and she slid down to the floor, no trauma or LOC, chest pain or palpitation. Unable to get up for 2-3 hrs. Denies nausea, vomiting, diarrhea, abdominal pain, SOB, chest pain, FREEMAN, palpitations, dizziness, headache, cough, wheezing, joint pain or swelling, fever, chills.         ER work up was done . Labs showed acute renal failure, CT chest was done showing possible consolidation. Discussed with family members she has history of pulmonary infiltrates on the right side.(NOT A NEW FINDING, will continue to follow up as outpatient.         Patient's creatinine was elevated to 4.16, she was admitted for KYLE on CKD secondary to use of diuretics and dehydration as patient was not drinking well given oral thrush.      Nephrologist Dr. Barone was consulted, home medications were held. patient was given mild IV hydration, her creatinine down trended. Renal US showed hydronephrosis.         Patient's UA was positive, IV antibiotics were given for total 5 days. Urine cx were negative.     She was given IV Fluconazole and nystatin for oral thrush, which helped with symptoms.        Given patient's improved clinical status and current hemodynamic stability, decision was made to discharge. Discussed with attending    Please refer to patient's complete medical chart with documents for a full hospital course, for this is only a brief summary.

## 2019-04-29 NOTE — DISCHARGE NOTE PROVIDER - NSDCCPCAREPLAN_GEN_ALL_CORE_FT
PRINCIPAL DISCHARGE DIAGNOSIS  Diagnosis: KYLE (acute kidney injury)  Assessment and Plan of Treatment: You were admitted for acute kidney injury secondary to dehydration and medications. Your home medications, Enteresto, bumex and metolazone were held. Nephrologist was consulted, renal Ultrasound was normal. Creatinine levels down trended. You are advised to hold these medications and follow up with your cardiologist within a week to re-start as indicated.      SECONDARY DISCHARGE DIAGNOSES  Diagnosis: COPD (chronic obstructive pulmonary disease)  Assessment and Plan of Treatment: You have history of COPD, continue taking your home medications and taper steroids as prescribed.    Diagnosis: Oral thrush  Assessment and Plan of Treatment: You were noted to have difficulty in swallowing, noted to have oral thrush could be secondary to prolong use of steroids. IV fluconazole was given,    Diagnosis: UTI (urinary tract infection)  Assessment and Plan of Treatment: UTI (urinary tract infection)    Diagnosis: CHF (congestive heart failure)  Assessment and Plan of Treatment: CHF (congestive heart failure) PRINCIPAL DISCHARGE DIAGNOSIS  Diagnosis: KYLE (acute kidney injury)  Assessment and Plan of Treatment: You were admitted for acute kidney injury secondary to dehydration and medications. Your home medications, Enteresto, bumex and metolazone were held. Nephrologist was consulted, renal Ultrasound was normal. Creatinine levels down trended. You are advised to hold these medications and follow up with your cardiologist within a week to re-start as indicated. Follow up with nephrologist in one week.      SECONDARY DISCHARGE DIAGNOSES  Diagnosis: COPD (chronic obstructive pulmonary disease)  Assessment and Plan of Treatment: You have history of COPD, continue taking your home medications and taper steroids as prescribed.    Diagnosis: Oral thrush  Assessment and Plan of Treatment: You were noted to have difficulty in swallowing, noted to have oral thrush could be secondary to prolong use of steroids. IV fluconazole was given, continue taking nystatin for another week as prescribed.    Diagnosis: UTI (urinary tract infection)  Assessment and Plan of Treatment: You were noted to postive UA, urine cultures were sent which came negative. Antibiotics were started prophylactically, course is completed.    Diagnosis: CHF (congestive heart failure)  Assessment and Plan of Treatment: You have history of CHF, medications were held given worsening kidney function. You are advised to follow up with your cardiologist in one week and may re-start medications as advised by cardiologist.    Diagnosis: Diabetes mellitus  Assessment and Plan of Treatment: You have history of diabetes mellitus, HbA1C was noted to be 8.5%, metformin is held given worsening renal function. You are advised to continue taking medications as prescribed. PRINCIPAL DISCHARGE DIAGNOSIS  Diagnosis: KYLE (acute kidney injury)  Assessment and Plan of Treatment: You were admitted for acute kidney injury secondary to dehydration and medications. Your home medications, Enteresto, bumex and metolazone were held. Nephrologist was consulted, renal Ultrasound was normal. Creatinine levels down trended. You are advised to hold these medications and follow up with your cardiologist within a week to re-start as indicated. Follow up with nephrologist in one week.      SECONDARY DISCHARGE DIAGNOSES  Diagnosis: COPD (chronic obstructive pulmonary disease)  Assessment and Plan of Treatment: You have history of COPD, continue taking your home medications and taper steroids as prescribed.    Diagnosis: Oral thrush  Assessment and Plan of Treatment: You were noted to have difficulty in swallowing, noted to have oral thrush could be secondary to prolong use of steroids. IV fluconazole was given, continue taking nystatin for another week as prescribed.    Diagnosis: UTI (urinary tract infection)  Assessment and Plan of Treatment: You were noted to postive UA, urine cultures were sent which came negative. Antibiotics were started prophylactically, course is completed.    Diagnosis: CHF (congestive heart failure)  Assessment and Plan of Treatment: You have history of CHF, medications were held given worsening kidney function. You are advised to follow up with your cardiologist in one week and may re-start medications as advised by cardiologist.    Diagnosis: Diabetes mellitus  Assessment and Plan of Treatment: You have history of diabetes mellitus, HbA1C was noted to be 8.5%, continue home medications.

## 2019-04-29 NOTE — DISCHARGE NOTE PROVIDER - PROVIDER TOKENS
PROVIDER:[TOKEN:[[id number]],FOLLOWUP:[1 week]] PROVIDER:[TOKEN:[[id number]],FOLLOWUP:[1 week]],PROVIDER:[TOKEN:[[id number]],FOLLOWUP:[1 week]]

## 2019-04-29 NOTE — DISCHARGE NOTE PROVIDER - CARE PROVIDER_API CALL
Chano Barone)  Internal Medicine; Nephrology  [address] [place] [address]  Phone: [phone]  Fax: [phone]  Follow Up Time: 1 week Chano Barone)  Internal Medicine; Nephrology  [address] [place] [address]  Phone: [phone]  Fax: [phone]  Follow Up Time: 1 week    Gaurav Grewal)  Medicine  [address] [place] [address]  Phone: [phone]  Fax: [phone]  Follow Up Time: 1 week

## 2019-04-29 NOTE — PROGRESS NOTE ADULT - NSHPATTENDINGPLANDISCUSS_GEN_ALL_CORE
nurse/resident doctor
resident doctor/nurse

## 2019-04-29 NOTE — PROGRESS NOTE ADULT - SUBJECTIVE AND OBJECTIVE BOX
[facility] : Progress Note :: [phone], (office [phone]),   Dr Barone / Dr Fontanez / Dr Schreiber / Dr Koo / Dr Harvey HOOKER / Dr Mcnally / Dr Hernandez / Dr Steven yip  _____________________________________________________________________________________________    no events overnight.    No Known Drug Allergies  strawberry (Unknown)    Hospital Medications:   MEDICATIONS  (STANDING):  aspirin 325 milliGRAM(s) Oral daily  azithromycin   Tablet 500 milliGRAM(s) Oral daily  buDESOnide 160 MICROgram(s)/formoterol 4.5 MICROgram(s) Inhaler 2 Puff(s) Inhalation two times a day  cefTRIAXone   IVPB 1 Gram(s) IV Intermittent every 24 hours  fluconAZOLE IVPB 100 milliGRAM(s) IV Intermittent every 24 hours  heparin  Injectable 5000 Unit(s) SubCutaneous every 8 hours  insulin lispro (HumaLOG) corrective regimen sliding scale   SubCutaneous Before meals and at bedtime  lactulose Syrup 30 Gram(s) Oral daily  multivitamin/minerals 1 Tablet(s) Oral daily  nystatin    Suspension 261324 Unit(s) Oral four times a day  nystatin Powder 1 Application(s) Topical two times a day  pantoprazole    Tablet 40 milliGRAM(s) Oral before breakfast  predniSONE   Tablet 20 milliGRAM(s) Oral daily  tiotropium 18 MICROgram(s) Capsule 1 Capsule(s) Inhalation daily        VITALS:  T(F): 98 (19 @ 15:18), Max: 98.4 (19 @ 05:09)  HR: 103 (19 @ 15:18)  BP: 138/72 (19 @ 15:18)  RR: 19 (19 @ 15:18)  SpO2: 98% (19 @ 15:18)  Wt(kg): --     @ 07:01  -   @ 07:00  --------------------------------------------------------  IN: 220 mL / OUT: 2050 mL / NET: -1830 mL     @ 07:01  -   @ 18:42  --------------------------------------------------------  IN: 0 mL / OUT: 350 mL / NET: -350 mL        PHYSICAL EXAM:  Constitutional: NAD  HEENT: anicteric sclera, oropharynx clear.  Neck: No JVD  Respiratory: CTAB, no wheezes, rales or rhonchi  Cardiovascular: S1, S2, RRR  Gastrointestinal: BS+, soft, NT/ND  Extremities: No cyanosis or clubbing. No peripheral edema  Neurological: A/O x 3, no focal deficits  Psychiatric: Normal mood, normal affect  :  osborne+      LABS:      140  |  100  |  46<H>  ----------------------------<  321<H>  3.5   |  31  |  1.44<H>    Ca    9.9      2019 10:29  Phos  2.2       Mg     1.8           Creatinine Trend: 1.44 <--, 1.29 <--, 1.56 <--, 1.99 <--, 4.16 <--                        10.9   9.04  )-----------( 185      ( 2019 10:29 )             34.8     Urine Studies:  Urinalysis Basic - ( 2019 11:26 )    Color: Yellow / Appearance: Slightly Turbid / S.010 / pH:   Gluc:  / Ketone: Trace  / Bili: Negative / Urobili: Negative   Blood:  / Protein: 30 mg/dL / Nitrite: Negative   Leuk Esterase: Moderate / RBC: 10-25 /HPF / WBC 11-25 /HPF   Sq Epi:  / Non Sq Epi: Moderate /HPF / Bacteria: Few /HPF      Osmolality, Random Urine: 401 mos/kg ( @ 15:31)  Creatinine, Random Urine: 53 mg/dL ( @ 15:31)  Sodium, Random Urine: 45 mmol/L ( @ 15:31)    RADIOLOGY & ADDITIONAL STUDIES:

## 2019-04-29 NOTE — DISCHARGE NOTE NURSING/CASE MANAGEMENT/SOCIAL WORK - NSDCDPATPORTLINK_GEN_ALL_CORE
You can access the [facility] Patient Portal, offered by [facility], by registering with the following website: [email or web address]

## 2019-04-29 NOTE — DISCHARGE NOTE NURSING/CASE MANAGEMENT/SOCIAL WORK - NSDCPEEMAIL_GEN_ALL_CORE
[facility] for Tobacco Control email [email or web address]

## 2019-04-29 NOTE — PROGRESS NOTE ADULT - SUBJECTIVE AND OBJECTIVE BOX
PGY 1 Note discussed with supervising resident and primary attending    Patient is a 78y old  Female who presents with a chief complaint of KYLE/fall/weakness (28 Apr 2019 10:03)      INTERVAL HPI/OVERNIGHT EVENTS: Patient was seen and examined, she feels better, denies any dysphagia.     MEDICATIONS  (STANDING):  aspirin 325 milliGRAM(s) Oral daily  azithromycin   Tablet 500 milliGRAM(s) Oral daily  buDESOnide 160 MICROgram(s)/formoterol 4.5 MICROgram(s) Inhaler 2 Puff(s) Inhalation two times a day  cefTRIAXone   IVPB 1 Gram(s) IV Intermittent every 24 hours  fluconAZOLE IVPB 100 milliGRAM(s) IV Intermittent every 24 hours  heparin  Injectable 5000 Unit(s) SubCutaneous every 8 hours  insulin lispro (HumaLOG) corrective regimen sliding scale   SubCutaneous Before meals and at bedtime  multivitamin/minerals 1 Tablet(s) Oral daily  nystatin    Suspension 788172 Unit(s) Oral four times a day  nystatin Powder 1 Application(s) Topical two times a day  pantoprazole    Tablet 40 milliGRAM(s) Oral before breakfast  predniSONE   Tablet 20 milliGRAM(s) Oral daily  tiotropium 18 MICROgram(s) Capsule 1 Capsule(s) Inhalation daily    MEDICATIONS  (PRN):  ALBUTerol    90 MICROgram(s) HFA Inhaler 2 Puff(s) Inhalation every 6 hours PRN Bronchospasm      __________________________________________________  REVIEW OF SYSTEMS:    CONSTITUTIONAL: No fever,   EYES: no acute visual disturbances  NECK: No pain or stiffness  RESPIRATORY: No cough; No shortness of breath  CARDIOVASCULAR: No chest pain, no palpitations  GASTROINTESTINAL: No pain. No nausea or vomiting; No diarrhea   NEUROLOGICAL: No headache or numbness, no tremors  MUSCULOSKELETAL: No joint pain, no muscle pain  GENITOURINARY: no dysuria, no frequency, no hesitancy  PSYCHIATRY: no depression , no anxiety  ALL OTHER  ROS negative        Vital Signs Last 24 Hrs  T(C): 36.7 (29 Apr 2019 07:46), Max: 37 (28 Apr 2019 15:23)  T(F): 98 (29 Apr 2019 07:46), Max: 98.6 (28 Apr 2019 15:23)  HR: 97 (29 Apr 2019 07:46) (86 - 103)  BP: 136/77 (29 Apr 2019 07:46) (116/- - 136/77)  BP(mean): --  RR: 17 (29 Apr 2019 07:46) (17 - 18)  SpO2: 99% (29 Apr 2019 07:46) (96% - 99%)    ________________________________________________  PHYSICAL EXAM:  GENERAL: NAD, obese elderly female   HEENT: Normocephalic;  conjunctivae and sclerae clear; moist mucous membranes;   NECK : supple  CHEST/LUNG: Clear to auscultation bilaterally with good air entry   HEART: S1 S2  regular; no murmurs, gallops or rubs  ABDOMEN: Soft, Nontender, Nondistended; Bowel sounds present  EXTREMITIES: no cyanosis; no edema; no calf tenderness  SKIN: warm and dry; no rash  NERVOUS SYSTEM:  Awake and alert; Oriented  to place, person and time ; no new deficits    _________________________________________________  LABS:                        11.2   8.37  )-----------( 180      ( 28 Apr 2019 07:42 )             35.0     04-28    143  |  102  |  51<H>  ----------------------------<  188<H>  3.3<L>   |  33<H>  |  1.29    Ca    10.0      28 Apr 2019 07:42          CAPILLARY BLOOD GLUCOSE      POCT Blood Glucose.: 172 mg/dL (29 Apr 2019 07:51)  POCT Blood Glucose.: 180 mg/dL (28 Apr 2019 22:52)  POCT Blood Glucose.: 211 mg/dL (28 Apr 2019 21:36)  POCT Blood Glucose.: 183 mg/dL (28 Apr 2019 17:02)  POCT Blood Glucose.: 294 mg/dL (28 Apr 2019 11:51)        RADIOLOGY & ADDITIONAL TESTS:    Imaging Personally Reviewed:  YES    Consultant(s) Notes Reviewed:   YES    Care Discussed with Consultants : YES     Plan of care was discussed with patient and /or primary care giver; all questions and concerns were addressed and care was aligned with patient's wishes.

## 2019-04-29 NOTE — PROGRESS NOTE ADULT - PROBLEM SELECTOR PLAN 4
History of COPD  on home Oxygen, Zithromax 500mg PO M/W/Th Advair, Spiriva, Prednisone 20 mg daily   C/W home medications  Not in any exacerbation

## 2019-04-29 NOTE — PROGRESS NOTE ADULT - ASSESSMENT
# KYLE sec to a pre-renal state sec to a pre-renal state and ATN from poor oral intake and UTI/pneumonia. Also was on ARB's and lasix.on hold. S/p  IVF.  renal function much improved and brisk urine output  renal sono with no obstruction.    recs:  suggest D/C  osborne  cont ABX for PNA and UTI  cont to avoid NSAID's and IV contrast.  rpt BMP in AM    will sign off. thanks

## 2019-04-29 NOTE — DISCHARGE NOTE NURSING/CASE MANAGEMENT/SOCIAL WORK - NSDCPEWEB_GEN_ALL_CORE
[facility] for Tobacco Control website --- [email or web address] --- [email or web address]

## 2020-02-12 ENCOUNTER — INPATIENT (INPATIENT)
Facility: HOSPITAL | Age: 80
LOS: 6 days | Discharge: NURSING, REHAB OR LONG-TERM CARE | DRG: 291 | End: 2020-02-19
Attending: INTERNAL MEDICINE | Admitting: INTERNAL MEDICINE
Payer: MEDICARE

## 2020-02-12 VITALS
HEIGHT: 62 IN | HEART RATE: 123 BPM | SYSTOLIC BLOOD PRESSURE: 131 MMHG | OXYGEN SATURATION: 98 % | TEMPERATURE: 98 F | WEIGHT: 190.04 LBS | DIASTOLIC BLOOD PRESSURE: 78 MMHG | RESPIRATION RATE: 16 BRPM

## 2020-02-12 DIAGNOSIS — E87.1 HYPO-OSMOLALITY AND HYPONATREMIA: ICD-10-CM

## 2020-02-12 DIAGNOSIS — Z95.2 PRESENCE OF PROSTHETIC HEART VALVE: Chronic | ICD-10-CM

## 2020-02-12 DIAGNOSIS — R73.9 HYPERGLYCEMIA, UNSPECIFIED: ICD-10-CM

## 2020-02-12 DIAGNOSIS — J44.9 CHRONIC OBSTRUCTIVE PULMONARY DISEASE, UNSPECIFIED: ICD-10-CM

## 2020-02-12 DIAGNOSIS — R60.0 LOCALIZED EDEMA: ICD-10-CM

## 2020-02-12 DIAGNOSIS — I50.9 HEART FAILURE, UNSPECIFIED: ICD-10-CM

## 2020-02-12 DIAGNOSIS — N17.9 ACUTE KIDNEY FAILURE, UNSPECIFIED: ICD-10-CM

## 2020-02-12 DIAGNOSIS — Z29.9 ENCOUNTER FOR PROPHYLACTIC MEASURES, UNSPECIFIED: ICD-10-CM

## 2020-02-12 DIAGNOSIS — E11.9 TYPE 2 DIABETES MELLITUS WITHOUT COMPLICATIONS: ICD-10-CM

## 2020-02-12 DIAGNOSIS — E87.5 HYPERKALEMIA: ICD-10-CM

## 2020-02-12 PROBLEM — I25.10 ATHEROSCLEROTIC HEART DISEASE OF NATIVE CORONARY ARTERY WITHOUT ANGINA PECTORIS: Chronic | Status: ACTIVE | Noted: 2019-04-25

## 2020-02-12 LAB
ALBUMIN SERPL ELPH-MCNC: 3.3 G/DL — LOW (ref 3.5–5)
ALP SERPL-CCNC: 110 U/L — SIGNIFICANT CHANGE UP (ref 40–120)
ALT FLD-CCNC: 25 U/L DA — SIGNIFICANT CHANGE UP (ref 10–60)
ANION GAP SERPL CALC-SCNC: 8 MMOL/L — SIGNIFICANT CHANGE UP (ref 5–17)
APPEARANCE UR: CLEAR — SIGNIFICANT CHANGE UP
APPEARANCE UR: CLEAR — SIGNIFICANT CHANGE UP
AST SERPL-CCNC: 13 U/L — SIGNIFICANT CHANGE UP (ref 10–40)
BACTERIA # UR AUTO: ABNORMAL /HPF
BASOPHILS # BLD AUTO: 0 K/UL — SIGNIFICANT CHANGE UP (ref 0–0.2)
BASOPHILS NFR BLD AUTO: 0 % — SIGNIFICANT CHANGE UP (ref 0–2)
BILIRUB SERPL-MCNC: 0.6 MG/DL — SIGNIFICANT CHANGE UP (ref 0.2–1.2)
BILIRUB UR-MCNC: NEGATIVE — SIGNIFICANT CHANGE UP
BILIRUB UR-MCNC: NEGATIVE — SIGNIFICANT CHANGE UP
BUN SERPL-MCNC: 86 MG/DL — HIGH (ref 7–18)
CALCIUM SERPL-MCNC: 9.8 MG/DL — SIGNIFICANT CHANGE UP (ref 8.4–10.5)
CHLORIDE SERPL-SCNC: 95 MMOL/L — LOW (ref 96–108)
CK MB BLD-MCNC: 8.9 % — HIGH (ref 0–3.5)
CK MB BLD-MCNC: 9.3 % — HIGH (ref 0–3.5)
CK MB CFR SERPL CALC: 2.4 NG/ML — SIGNIFICANT CHANGE UP (ref 0–3.6)
CK MB CFR SERPL CALC: 2.6 NG/ML — SIGNIFICANT CHANGE UP (ref 0–3.6)
CK SERPL-CCNC: 27 U/L — SIGNIFICANT CHANGE UP (ref 21–215)
CK SERPL-CCNC: 28 U/L — SIGNIFICANT CHANGE UP (ref 21–215)
CO2 SERPL-SCNC: 25 MMOL/L — SIGNIFICANT CHANGE UP (ref 22–31)
COLOR SPEC: YELLOW — SIGNIFICANT CHANGE UP
COLOR SPEC: YELLOW — SIGNIFICANT CHANGE UP
CREAT ?TM UR-MCNC: 72 MG/DL — SIGNIFICANT CHANGE UP
CREAT SERPL-MCNC: 2.09 MG/DL — HIGH (ref 0.5–1.3)
DIFF PNL FLD: ABNORMAL
DIFF PNL FLD: ABNORMAL
EOSINOPHIL # BLD AUTO: 0 K/UL — SIGNIFICANT CHANGE UP (ref 0–0.5)
EOSINOPHIL NFR BLD AUTO: 0 % — SIGNIFICANT CHANGE UP (ref 0–6)
EPI CELLS # UR: ABNORMAL /HPF
FLU A RESULT: SIGNIFICANT CHANGE UP
FLU A RESULT: SIGNIFICANT CHANGE UP
FLUAV AG NPH QL: SIGNIFICANT CHANGE UP
FLUBV AG NPH QL: SIGNIFICANT CHANGE UP
GLUCOSE SERPL-MCNC: 472 MG/DL — CRITICAL HIGH (ref 70–99)
GLUCOSE UR QL: 1000 MG/DL
GLUCOSE UR QL: 1000 MG/DL
HCT VFR BLD CALC: 44.2 % — SIGNIFICANT CHANGE UP (ref 34.5–45)
HGB BLD-MCNC: 14.5 G/DL — SIGNIFICANT CHANGE UP (ref 11.5–15.5)
HYALINE CASTS # UR AUTO: ABNORMAL /LPF
KETONES UR-MCNC: NEGATIVE — SIGNIFICANT CHANGE UP
KETONES UR-MCNC: NEGATIVE — SIGNIFICANT CHANGE UP
LEUKOCYTE ESTERASE UR-ACNC: ABNORMAL
LEUKOCYTE ESTERASE UR-ACNC: NEGATIVE — SIGNIFICANT CHANGE UP
LYMPHOCYTES # BLD AUTO: 0.27 K/UL — LOW (ref 1–3.3)
LYMPHOCYTES # BLD AUTO: 3 % — LOW (ref 13–44)
MAGNESIUM SERPL-MCNC: 2.3 MG/DL — SIGNIFICANT CHANGE UP (ref 1.6–2.6)
MANUAL SMEAR VERIFICATION: SIGNIFICANT CHANGE UP
MCHC RBC-ENTMCNC: 28.4 PG — SIGNIFICANT CHANGE UP (ref 27–34)
MCHC RBC-ENTMCNC: 32.8 GM/DL — SIGNIFICANT CHANGE UP (ref 32–36)
MCV RBC AUTO: 86.5 FL — SIGNIFICANT CHANGE UP (ref 80–100)
METAMYELOCYTES # FLD: 4 % — HIGH (ref 0–0)
MONOCYTES # BLD AUTO: 0.27 K/UL — SIGNIFICANT CHANGE UP (ref 0–0.9)
MONOCYTES NFR BLD AUTO: 3 % — SIGNIFICANT CHANGE UP (ref 2–14)
MYELOCYTES NFR BLD: 1 % — HIGH (ref 0–0)
NEUTROPHILS # BLD AUTO: 7.8 K/UL — HIGH (ref 1.8–7.4)
NEUTROPHILS NFR BLD AUTO: 87 % — HIGH (ref 43–77)
NITRITE UR-MCNC: NEGATIVE — SIGNIFICANT CHANGE UP
NITRITE UR-MCNC: NEGATIVE — SIGNIFICANT CHANGE UP
NRBC # BLD: 0 /100 — SIGNIFICANT CHANGE UP (ref 0–0)
NT-PROBNP SERPL-SCNC: 720 PG/ML — HIGH (ref 0–450)
OSMOLALITY UR: 460 MOS/KG — SIGNIFICANT CHANGE UP (ref 50–1200)
PH UR: 5 — SIGNIFICANT CHANGE UP (ref 5–8)
PH UR: 5 — SIGNIFICANT CHANGE UP (ref 5–8)
PHOSPHATE SERPL-MCNC: 3.5 MG/DL — SIGNIFICANT CHANGE UP (ref 2.5–4.5)
PLAT MORPH BLD: NORMAL — SIGNIFICANT CHANGE UP
PLATELET # BLD AUTO: 156 K/UL — SIGNIFICANT CHANGE UP (ref 150–400)
PLATELET COUNT - ESTIMATE: NORMAL — SIGNIFICANT CHANGE UP
POTASSIUM SERPL-MCNC: 5.4 MMOL/L — HIGH (ref 3.5–5.3)
POTASSIUM SERPL-MCNC: 6.1 MMOL/L — HIGH (ref 3.5–5.3)
POTASSIUM SERPL-SCNC: 5.4 MMOL/L — HIGH (ref 3.5–5.3)
POTASSIUM SERPL-SCNC: 6.1 MMOL/L — HIGH (ref 3.5–5.3)
POTASSIUM UR-SCNC: 17 MMOL/L — SIGNIFICANT CHANGE UP
PROT ?TM UR-MCNC: 18 MG/DL — HIGH (ref 0–12)
PROT SERPL-MCNC: 6.4 G/DL — SIGNIFICANT CHANGE UP (ref 6–8.3)
PROT UR-MCNC: 15
PROT UR-MCNC: NEGATIVE — SIGNIFICANT CHANGE UP
RBC # BLD: 5.11 M/UL — SIGNIFICANT CHANGE UP (ref 3.8–5.2)
RBC # FLD: 15.3 % — HIGH (ref 10.3–14.5)
RBC BLD AUTO: NORMAL — SIGNIFICANT CHANGE UP
RBC CASTS # UR COMP ASSIST: ABNORMAL /HPF (ref 0–2)
RSV RESULT: SIGNIFICANT CHANGE UP
RSV RNA RESP QL NAA+PROBE: SIGNIFICANT CHANGE UP
SODIUM SERPL-SCNC: 128 MMOL/L — LOW (ref 135–145)
SODIUM UR-SCNC: 49 MMOL/L — SIGNIFICANT CHANGE UP
SP GR SPEC: 1.01 — SIGNIFICANT CHANGE UP (ref 1.01–1.02)
SP GR SPEC: 1.01 — SIGNIFICANT CHANGE UP (ref 1.01–1.02)
TROPONIN I SERPL-MCNC: <0.015 NG/ML — SIGNIFICANT CHANGE UP (ref 0–0.04)
UROBILINOGEN FLD QL: NEGATIVE — SIGNIFICANT CHANGE UP
UROBILINOGEN FLD QL: NEGATIVE — SIGNIFICANT CHANGE UP
VARIANT LYMPHS # BLD: 2 % — SIGNIFICANT CHANGE UP (ref 0–6)
WBC # BLD: 8.96 K/UL — SIGNIFICANT CHANGE UP (ref 3.8–10.5)
WBC # FLD AUTO: 8.96 K/UL — SIGNIFICANT CHANGE UP (ref 3.8–10.5)
WBC UR QL: SIGNIFICANT CHANGE UP /HPF (ref 0–5)

## 2020-02-12 PROCEDURE — 71045 X-RAY EXAM CHEST 1 VIEW: CPT | Mod: 26

## 2020-02-12 PROCEDURE — 99285 EMERGENCY DEPT VISIT HI MDM: CPT

## 2020-02-12 PROCEDURE — 93971 EXTREMITY STUDY: CPT | Mod: 26,LT

## 2020-02-12 RX ORDER — FUROSEMIDE 40 MG
80 TABLET ORAL ONCE
Refills: 0 | Status: DISCONTINUED | OUTPATIENT
Start: 2020-02-12 | End: 2020-02-12

## 2020-02-12 RX ORDER — ACETAMINOPHEN 500 MG
650 TABLET ORAL EVERY 6 HOURS
Refills: 0 | Status: DISCONTINUED | OUTPATIENT
Start: 2020-02-12 | End: 2020-02-19

## 2020-02-12 RX ORDER — PANTOPRAZOLE SODIUM 20 MG/1
40 TABLET, DELAYED RELEASE ORAL
Refills: 0 | Status: DISCONTINUED | OUTPATIENT
Start: 2020-02-12 | End: 2020-02-19

## 2020-02-12 RX ORDER — TIOTROPIUM BROMIDE 18 UG/1
1 CAPSULE ORAL; RESPIRATORY (INHALATION) DAILY
Refills: 0 | Status: DISCONTINUED | OUTPATIENT
Start: 2020-02-12 | End: 2020-02-19

## 2020-02-12 RX ORDER — CALCIUM GLUCONATE 100 MG/ML
1 VIAL (ML) INTRAVENOUS ONCE
Refills: 0 | Status: COMPLETED | OUTPATIENT
Start: 2020-02-12 | End: 2020-02-12

## 2020-02-12 RX ORDER — ATORVASTATIN CALCIUM 80 MG/1
40 TABLET, FILM COATED ORAL AT BEDTIME
Refills: 0 | Status: DISCONTINUED | OUTPATIENT
Start: 2020-02-12 | End: 2020-02-19

## 2020-02-12 RX ORDER — INSULIN HUMAN 100 [IU]/ML
6 INJECTION, SOLUTION SUBCUTANEOUS ONCE
Refills: 0 | Status: COMPLETED | OUTPATIENT
Start: 2020-02-12 | End: 2020-02-12

## 2020-02-12 RX ORDER — SACUBITRIL AND VALSARTAN 24; 26 MG/1; MG/1
1 TABLET, FILM COATED ORAL
Refills: 0 | Status: DISCONTINUED | OUTPATIENT
Start: 2020-02-12 | End: 2020-02-16

## 2020-02-12 RX ORDER — BUDESONIDE AND FORMOTEROL FUMARATE DIHYDRATE 160; 4.5 UG/1; UG/1
2 AEROSOL RESPIRATORY (INHALATION)
Refills: 0 | Status: DISCONTINUED | OUTPATIENT
Start: 2020-02-12 | End: 2020-02-19

## 2020-02-12 RX ORDER — MULTIVIT-MIN/FERROUS GLUCONATE 9 MG/15 ML
1 LIQUID (ML) ORAL DAILY
Refills: 0 | Status: DISCONTINUED | OUTPATIENT
Start: 2020-02-12 | End: 2020-02-19

## 2020-02-12 RX ORDER — BUMETANIDE 0.25 MG/ML
2 INJECTION INTRAMUSCULAR; INTRAVENOUS ONCE
Refills: 0 | Status: COMPLETED | OUTPATIENT
Start: 2020-02-12 | End: 2020-02-12

## 2020-02-12 RX ORDER — INSULIN LISPRO 100/ML
VIAL (ML) SUBCUTANEOUS
Refills: 0 | Status: DISCONTINUED | OUTPATIENT
Start: 2020-02-12 | End: 2020-02-19

## 2020-02-12 RX ORDER — HEPARIN SODIUM 5000 [USP'U]/ML
5000 INJECTION INTRAVENOUS; SUBCUTANEOUS EVERY 8 HOURS
Refills: 0 | Status: DISCONTINUED | OUTPATIENT
Start: 2020-02-12 | End: 2020-02-19

## 2020-02-12 RX ORDER — INSULIN GLARGINE 100 [IU]/ML
10 INJECTION, SOLUTION SUBCUTANEOUS AT BEDTIME
Refills: 0 | Status: DISCONTINUED | OUTPATIENT
Start: 2020-02-12 | End: 2020-02-15

## 2020-02-12 RX ORDER — INSULIN GLARGINE 100 [IU]/ML
8 INJECTION, SOLUTION SUBCUTANEOUS AT BEDTIME
Refills: 0 | Status: DISCONTINUED | OUTPATIENT
Start: 2020-02-12 | End: 2020-02-13

## 2020-02-12 RX ORDER — BUMETANIDE 0.25 MG/ML
1 INJECTION INTRAMUSCULAR; INTRAVENOUS
Refills: 0 | Status: DISCONTINUED | OUTPATIENT
Start: 2020-02-13 | End: 2020-02-15

## 2020-02-12 RX ORDER — ALBUTEROL 90 UG/1
2 AEROSOL, METERED ORAL EVERY 6 HOURS
Refills: 0 | Status: DISCONTINUED | OUTPATIENT
Start: 2020-02-12 | End: 2020-02-19

## 2020-02-12 RX ORDER — ASPIRIN/CALCIUM CARB/MAGNESIUM 324 MG
81 TABLET ORAL DAILY
Refills: 0 | Status: DISCONTINUED | OUTPATIENT
Start: 2020-02-12 | End: 2020-02-19

## 2020-02-12 RX ADMIN — HEPARIN SODIUM 5000 UNIT(S): 5000 INJECTION INTRAVENOUS; SUBCUTANEOUS at 22:51

## 2020-02-12 RX ADMIN — ATORVASTATIN CALCIUM 40 MILLIGRAM(S): 80 TABLET, FILM COATED ORAL at 23:37

## 2020-02-12 RX ADMIN — INSULIN HUMAN 6 UNIT(S): 100 INJECTION, SOLUTION SUBCUTANEOUS at 17:40

## 2020-02-12 RX ADMIN — BUMETANIDE 2 MILLIGRAM(S): 0.25 INJECTION INTRAMUSCULAR; INTRAVENOUS at 17:47

## 2020-02-12 RX ADMIN — BUDESONIDE AND FORMOTEROL FUMARATE DIHYDRATE 2 PUFF(S): 160; 4.5 AEROSOL RESPIRATORY (INHALATION) at 23:01

## 2020-02-12 RX ADMIN — INSULIN GLARGINE 8 UNIT(S): 100 INJECTION, SOLUTION SUBCUTANEOUS at 23:01

## 2020-02-12 RX ADMIN — Medication 100 GRAM(S): at 22:50

## 2020-02-12 NOTE — H&P ADULT - PROBLEM SELECTOR PLAN 2
suspect pre renal due to CHF vs NSAID use vs  Hypovolemia due to poor oral intake vs Diuretics,   On admission Cr is 2.09    and Baseline creatinine is 1.29  f/u U/A , Urine lytes  f/u renal ultrasound  Monitor Creatinine  Renally dose medications, avoid nephrotoxins  Nephro Dr Barone is consulted

## 2020-02-12 NOTE — H&P ADULT - NSHPPHYSICALEXAM_GEN_ALL_CORE
CONSTITUTIONAL: Well appearing, well nourished, awake, alert and in no apparent distress  ENT: Airway patent, Nasal mucosa clear. Mouth with normal mucosa. Throat has no vesicles, no oropharyngeal exudates and uvula is midline.  EYES: Clear bilaterally, pupils equal, round and reactive to light. EOMI.  CARDIAC: Normal rate, regular rhythm.  Heart sounds S1, S2.  murmurs is present , rubs or gallops   RESPIRATORY: Breath sounds clear and equal bilaterally. No wheezes, rales or rhonchi  MUSCULOSKELETAL: Spine appears normal, range of motion is not limited, no muscle or joint tenderness  EXTREMITIES: 2+ Bilateral pitting edema is present  NEUROLOGICAL: Alert and oriented, no focal deficits, no motor or sensory deficits.  SKIN: No rash, skin turgor

## 2020-02-12 NOTE — ED ADULT NURSE NOTE - OBJECTIVE STATEMENT
c/o bilateral lower extremity +3 swelling for the past week. Pt was given a diuretic from PCP with no change in edema. Oxygen dependent 3L via Nasal Cannula. Pt uses a cane to ambulate but for the past few days unable to walk. Complaining of oral thrush. Had a similar episode 1 year ago.

## 2020-02-12 NOTE — H&P ADULT - PROBLEM SELECTOR PLAN 9
IMPROVE VTE Individual Risk Assessment    RISK                                                                Points  [  ] Previous VTE                                                  3  [  ] Thrombophilia                                               2  [  ] Lower limb paralysis                                      2        (unable to hold up >15 seconds)    [  ] Current Cancer                                              2         (within 6 months)  [x ] Immobilization > 24 hrs                                1  [  ] ICU/CCU stay > 24 hours                              1  [x  ] Age > 60                                                      1  IMPROVE VTE Score _2__DVT ppx Heparin sub q______  )

## 2020-02-12 NOTE — H&P ADULT - PROBLEM SELECTOR PLAN 5
Pt with potassium of 6.1. Most likely due to Hemolysis  Will repeat Potassium again.  ekg showed no t wave changes.  f/u BMP

## 2020-02-12 NOTE — ED PROVIDER NOTE - CLINICAL SUMMARY MEDICAL DECISION MAKING FREE TEXT BOX
Patient with copd and chf presenting worse worsening leg swelling. Suspect patient in chf exacerbation because of prednisone use. Will obtain labs, CXR, US lower extremity. Patient with copd and chf presenting worse worsening leg swelling. Suspect there is a component of hypovolemia despite leg swelling. Patient has a history of ATN after diuresis so consulted nephrology and may consider gentle hydration. Labs ordered, CXR w/o infiltrate or overload.

## 2020-02-12 NOTE — H&P ADULT - ASSESSMENT
79F from home, lives with daughter, ambulates independently with  PMH COPD(3.5L), HFrEF(45%, Ischemic),CAD, Aortic dissection s/p AVR(on ASA 81mg), HTN, DM2 who presents with worsening leg swelling since 2 weeks. Pt is admitted for KYLE and Uncontrolled Hyperglycemia. 79F from home, lives with daughter, ambulates independently with  PMH COPD(3.5L), HFrEF(45%, Ischemic),CAD, Aortic dissection s/p AVR(on ASA 81mg), HTN, DM2 who presents with worsening leg swelling since 2 weeks.  Pt is admitted for KYLE and Uncontrolled Hyperglycemia.

## 2020-02-12 NOTE — H&P ADULT - HISTORY OF PRESENT ILLNESS
79F from home, lives with daughter, ambulates independently with  PMH COPD(3.5L), HFrEF(45%, Ischemic),CAD, Aortic dissection s/p AVR(on ASA 81mg), HTN, DM2 who presents with worsening leg swelling since 2 weeks. She was seen by her cardiologist 2 weeks back and was started on Entresto because she was hypertensive in the setting of reduced EF and also adjusted her Bumex, cutting down to half. Pt reports she is not eating, sleeping, not able to walk, no strength in her legs. She normally takes few steps but now she can barely take 2 steps and needs some assistance.. She is not using her walker since 1 yr. She is also using Meloxicam for muscle spasms one pill every day. She reports the leg swelling is worse on the left. Breathing is at baseline. She is compliant with medications. She denies any fever, chills, n/v/d, chest pain, palpitations, abdominal pain, dysuria, frequency.   GOC Full code

## 2020-02-12 NOTE — H&P ADULT - PROBLEM SELECTOR PLAN 4
Uncontrolled Diabetes and she takes steroids, may be worsening due to steroids  -Patient takes at home: Metformin 500mg  -Hold oral hypoglycemics Metformin   Accucheck: Before meals and at bedtime  ISS  f/u Hba1c

## 2020-02-12 NOTE — H&P ADULT - EJECTION FRACTION >40 YES
01-09 Na154 mmol/L<H> Glu 102 mg/dL K+ 4.0 mmol/L Cr  1.25 mg/dL BUN 37.0 mg/dL<H> Phos 3.8 mg/dL Alb n/a   PAB n/a mildly reduced LV function

## 2020-02-12 NOTE — H&P ADULT - PROBLEM SELECTOR PLAN 6
Pt is wheezing on exam and former smoker  CXR showed Left basilar atelectasis  f/u RVP  c/w Duonebs  c/w Prednisone 10mg q12   Recently changes her Prednisone dose from 20mg q12 to 10mg q12

## 2020-02-12 NOTE — ED PROVIDER NOTE - OBJECTIVE STATEMENT
79F PMH COPD(3.5L), HFrEF(45%, Ischemic),CAD, Aortic dissection s/p AVR(on ASA 81mg), HTN, DM2 who presents with worsening leg swelling over the past two weeks. Around two weeks ago she was seen by her cardiologist and was started on Entresto because she was hypertensive in the setting of reduced EF. Since that time she reports increased fatigue. She normally can walk a few steps but she has been unable to do that. The cardiologist was called a few days ago and told of the leg swelling but he felt that she may have actually been intravascularly depleted so he cut the Bumex dose in half. She reports the leg swelling is worse on the left. Breathing is at baseline. She denies any fever, chills, n/v/d, chest pain, palpitations, abdominal pain, dysuria, frequency. She endorses orthopnea.

## 2020-02-12 NOTE — ED ADULT NURSE NOTE - PMH
CAD (coronary artery disease)    Chronic CHF    COPD (chronic obstructive pulmonary disease)    DM (diabetes mellitus)    H/O aortic valve replacement

## 2020-02-12 NOTE — CONSULT NOTE ADULT - SUBJECTIVE AND OBJECTIVE BOX
[facility] : Progress Note :: [phone], (office [phone]),   Dr Barone / Dr Fontanez / Dr Schreiber / Dr Koo / Dr Harvey HOOKER / Dr Mcnally / Dr Hernandez / Dr Steven yip  _____________________________________________________________________________________________  Patient is a 79y Female known to the renal service during prior hospitalizations. Has CKD III, HTN, CHF LVEF 45 %. Diuretics dose was decreased recently from 1 mg two times per day to daily by his cardiologist. Also has recently been taking NSAID's for LT hip pain. In ED noted to have bilateral leg edema with elevated SCR 2.09, hyperglycemia and hyperkalemia of 6.1 ( slightly hemolysed). renal consulted for elevated SCR and bilateral leg edema.     PAST MEDICAL & SURGICAL HISTORY:  Chronic CHF  H/O aortic valve replacement  DM (diabetes mellitus)  CAD (coronary artery disease)  COPD (chronic obstructive pulmonary disease)  Aortic valve prosthesis present    No Known Drug Allergies  strawberry (Unknown)    Home Medications Reviewed  Hospital Medications:   MEDICATIONS  (STANDING):  insulin regular  human recombinant. 6 Unit(s) IV Push once    SOCIAL HISTORY:  Denies ETOh,Smoking,   FAMILY HISTORY:  No pertinent family history in first degree relatives    REVIEW OF SYSTEMS:  CONSTITUTIONAL: No weakness, fevers or chills  EYES/ENT: No visual changes;  No vertigo or throat pain   NECK: No pain or stiffness  RESPIRATORY:  shortness of breath  CARDIOVASCULAR: No chest pain or palpitations.  GASTROINTESTINAL: No abdominal or epigastric pain. No nausea, vomiting, or hematemesis; No diarrhea or constipation. No melena or hematochezia.  GENITOURINARY: No dysuria, frequency, foamy urine, urinary urgency, incontinence or hematuria  NEUROLOGICAL: No numbness or weakness      VITALS:  T(F): 97.5 (02-12-20 @ 15:52), Max: 98 (02-12-20 @ 13:32)  HR: 102 (02-12-20 @ 15:52)  BP: 135/83 (02-12-20 @ 15:52)  RR: 16 (02-12-20 @ 15:52)  SpO2: 92% (02-12-20 @ 15:52)  Wt(kg): --    Height (cm): 157.48 (02-12 @ 13:32)  Weight (kg): 86.2 (02-12 @ 13:32)  BMI (kg/m2): 34.8 (02-12 @ 13:32)  BSA (m2): 1.87 (02-12 @ 13:32)    PHYSICAL EXAM:  Constitutional: NAD  HEENT: anicteric sclera, oropharynx clear, MMM  Neck: No JVD  Respiratory: CTAB, no wheezes, rales or rhonchi  Cardiovascular: S1, S2, RRR  Gastrointestinal: BS+, soft, NT/ND  Extremities: bilateral  peripheral edema+++  Neurological: A/O x 3, no focal deficits  : No CVA tenderness. No osborne.       LABS:  02-12    128<L>  |  95<L>  |  86<H>  ----------------------------<  472<HH>  6.1<H>   |  25  |  2.09<H>    Ca    9.8      12 Feb 2020 15:05  Phos  3.5     02-12  Mg     2.3     02-12    TPro  6.4  /  Alb  3.3<L>  /  TBili  0.6  /  DBili      /  AST  13  /  ALT  25  /  AlkPhos  110  02-12    Creatinine Trend: 2.09 <--                        14.5   8.96  )-----------( 156      ( 12 Feb 2020 15:05 )             44.2     Urine Studies:      RADIOLOGY & ADDITIONAL STUDIES:

## 2020-02-12 NOTE — H&P ADULT - NSHPLABSRESULTS_GEN_ALL_CORE
LABS:                        14.5   8.96  )-----------( 156      ( 2020 15:05 )             44.2     02-12    128<L>  |  95<L>  |  86<H>  ----------------------------<  472<HH>  6.1<H>   |  25  |  2.09<H>    Ca    9.8      2020 15:05  Phos  3.5     02-12  Mg     2.3     -12    TPro  6.4  /  Alb  3.3<L>  /  TBili  0.6  /  DBili  x   /  AST  13  /  ALT  25  /  AlkPhos  110  02-12      Urinalysis Basic - ( 2020 17:39 )    Color: Yellow / Appearance: Clear / S.015 / pH: x  Gluc: x / Ketone: Negative  / Bili: Negative / Urobili: Negative   Blood: x / Protein: 15 / Nitrite: Negative   Leuk Esterase: Negative / RBC: x / WBC x   Sq Epi: x / Non Sq Epi: x / Bacteria: x      LIVER FUNCTIONS - ( 2020 15:05 )  Alb: 3.3 g/dL / Pro: 6.4 g/dL / ALK PHOS: 110 U/L / ALT: 25 U/L DA / AST: 13 U/L / GGT: x

## 2020-02-12 NOTE — ED PROVIDER NOTE - CARDIAC, MLM
Normal rate, regular rhythm.  Heart sounds S1, S2.  systolic murmur hear diffusely, rubs or gallops.

## 2020-02-12 NOTE — ED PROVIDER NOTE - ATTENDING CONTRIBUTION TO CARE
80 yo F with COPD, CHF p/w increased b/l leg swelling  decreased appetite and PO intake  similar episode last year  no resp distress, cough, sob, fever or chest pain/dizziness  diminished BS b/l (unchanged)  2+ edema b/l  concern for acute CHF, renal failure, less likely DVT  labs, CXR, doppler, reassess  consulted Dr. Barone Neph[facility]  d/w Dr. Edward per patient family request

## 2020-02-12 NOTE — H&P ADULT - NSHPREVIEWOFSYSTEMS_GEN_ALL_CORE
REVIEW OF SYSTEMS:    CONSTITUTIONAL: No weakness, fevers or chills  EYES/ENT: No visual changes;  No vertigo or throat pain   NECK: No pain or stiffness  RESPIRATORY: No cough, wheezing, hemoptysis; No shortness of breath  CARDIOVASCULAR: No chest pain or palpitations  GASTROINTESTINAL: No abdominal or epigastric pain. No nausea, vomiting, or hematemesis; No diarrhea or constipation. No melena or hematochezia.  GENITOURINARY: No dysuria, frequency or hematuria  NEUROLOGICAL: No numbness or weakness  SKIN: No itching, burning, rashes, or lesions   EXTREMITIES Bilateral leg swelling is present   All other review of systems is negative unless indicated above.

## 2020-02-12 NOTE — CONSULT NOTE ADULT - ASSESSMENT
#KYLE on CKD III in a patinet with CHF. 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  # hyponatremia ( a component of pseudohyponatremia- hyperglycemia and hypervolemic hyponatremia  # hyperkalemia.  ( slightly hemolysed) from NSAID's, and KYLE.    recs:  resume diuretics:  lasix 2 mg IV bolus and then 1 mg IV BID  D/C NSAID's  await BMP and treat hyperkalemia medically  if present   RPT BMP in AM    Many thanks for the consult

## 2020-02-12 NOTE — H&P ADULT - PROBLEM SELECTOR PLAN 1
Pt is c/o Bilateral  leg swelling and  compliant with meds.  On exam fluid overloaded with bilateral crackles on lung exam and bilateral pitting edema.  In ED s/p Bumex 2mg   proBNP on admission 720 with evidence of fluid overload   troponin  on admission - T1 neg, f/u T2 and T3  starting asa, statin   c/w Home medication Entresto  Started on Bumex 1mg q12  strict I/Os, daily weights  f/u echo  tele monitoring  cards eval - Dr. Nelson is consulted

## 2020-02-13 LAB
24R-OH-CALCIDIOL SERPL-MCNC: 32.8 NG/ML — SIGNIFICANT CHANGE UP (ref 30–80)
ALBUMIN SERPL ELPH-MCNC: 2.7 G/DL — LOW (ref 3.5–5)
ALP SERPL-CCNC: 83 U/L — SIGNIFICANT CHANGE UP (ref 40–120)
ALT FLD-CCNC: 20 U/L DA — SIGNIFICANT CHANGE UP (ref 10–60)
ANION GAP SERPL CALC-SCNC: 8 MMOL/L — SIGNIFICANT CHANGE UP (ref 5–17)
AST SERPL-CCNC: 10 U/L — SIGNIFICANT CHANGE UP (ref 10–40)
BASOPHILS # BLD AUTO: 0.02 K/UL — SIGNIFICANT CHANGE UP (ref 0–0.2)
BASOPHILS NFR BLD AUTO: 0.3 % — SIGNIFICANT CHANGE UP (ref 0–2)
BILIRUB SERPL-MCNC: 0.5 MG/DL — SIGNIFICANT CHANGE UP (ref 0.2–1.2)
BUN SERPL-MCNC: 93 MG/DL — HIGH (ref 7–18)
CALCIUM SERPL-MCNC: 9.5 MG/DL — SIGNIFICANT CHANGE UP (ref 8.4–10.5)
CHLORIDE SERPL-SCNC: 99 MMOL/L — SIGNIFICANT CHANGE UP (ref 96–108)
CHOLEST SERPL-MCNC: 166 MG/DL — SIGNIFICANT CHANGE UP (ref 10–199)
CO2 SERPL-SCNC: 29 MMOL/L — SIGNIFICANT CHANGE UP (ref 22–31)
CREAT SERPL-MCNC: 1.81 MG/DL — HIGH (ref 0.5–1.3)
EOSINOPHIL # BLD AUTO: 0.05 K/UL — SIGNIFICANT CHANGE UP (ref 0–0.5)
EOSINOPHIL NFR BLD AUTO: 0.7 % — SIGNIFICANT CHANGE UP (ref 0–6)
FOLATE SERPL-MCNC: 7.9 NG/ML — SIGNIFICANT CHANGE UP
GLUCOSE BLDC GLUCOMTR-MCNC: 177 MG/DL — HIGH (ref 70–99)
GLUCOSE BLDC GLUCOMTR-MCNC: 189 MG/DL — HIGH (ref 70–99)
GLUCOSE BLDC GLUCOMTR-MCNC: 214 MG/DL — HIGH (ref 70–99)
GLUCOSE BLDC GLUCOMTR-MCNC: 221 MG/DL — HIGH (ref 70–99)
GLUCOSE BLDC GLUCOMTR-MCNC: 240 MG/DL — HIGH (ref 70–99)
GLUCOSE SERPL-MCNC: 151 MG/DL — HIGH (ref 70–99)
HBA1C BLD-MCNC: 11.5 % — HIGH (ref 4–5.6)
HCT VFR BLD CALC: 39.3 % — SIGNIFICANT CHANGE UP (ref 34.5–45)
HDLC SERPL-MCNC: 60 MG/DL — SIGNIFICANT CHANGE UP
HGB BLD-MCNC: 12.8 G/DL — SIGNIFICANT CHANGE UP (ref 11.5–15.5)
IMM GRANULOCYTES NFR BLD AUTO: 5.2 % — HIGH (ref 0–1.5)
LIPID PNL WITH DIRECT LDL SERPL: 25 MG/DL — SIGNIFICANT CHANGE UP
LYMPHOCYTES # BLD AUTO: 0.78 K/UL — LOW (ref 1–3.3)
LYMPHOCYTES # BLD AUTO: 10.6 % — LOW (ref 13–44)
MAGNESIUM SERPL-MCNC: 1.9 MG/DL — SIGNIFICANT CHANGE UP (ref 1.6–2.6)
MCHC RBC-ENTMCNC: 28.3 PG — SIGNIFICANT CHANGE UP (ref 27–34)
MCHC RBC-ENTMCNC: 32.6 GM/DL — SIGNIFICANT CHANGE UP (ref 32–36)
MCV RBC AUTO: 86.8 FL — SIGNIFICANT CHANGE UP (ref 80–100)
MONOCYTES # BLD AUTO: 0.45 K/UL — SIGNIFICANT CHANGE UP (ref 0–0.9)
MONOCYTES NFR BLD AUTO: 6.1 % — SIGNIFICANT CHANGE UP (ref 2–14)
NEUTROPHILS # BLD AUTO: 5.69 K/UL — SIGNIFICANT CHANGE UP (ref 1.8–7.4)
NEUTROPHILS NFR BLD AUTO: 77.1 % — HIGH (ref 43–77)
NRBC # BLD: 0 /100 WBCS — SIGNIFICANT CHANGE UP (ref 0–0)
PHOSPHATE SERPL-MCNC: 2.6 MG/DL — SIGNIFICANT CHANGE UP (ref 2.5–4.5)
PLATELET # BLD AUTO: 147 K/UL — LOW (ref 150–400)
POTASSIUM SERPL-MCNC: 4.6 MMOL/L — SIGNIFICANT CHANGE UP (ref 3.5–5.3)
POTASSIUM SERPL-SCNC: 4.6 MMOL/L — SIGNIFICANT CHANGE UP (ref 3.5–5.3)
PROT SERPL-MCNC: 5.2 G/DL — LOW (ref 6–8.3)
RBC # BLD: 4.53 M/UL — SIGNIFICANT CHANGE UP (ref 3.8–5.2)
RBC # FLD: 15.5 % — HIGH (ref 10.3–14.5)
SODIUM SERPL-SCNC: 136 MMOL/L — SIGNIFICANT CHANGE UP (ref 135–145)
TOTAL CHOLESTEROL/HDL RATIO MEASUREMENT: 2.8 RATIO — LOW (ref 3.3–7.1)
TRIGL SERPL-MCNC: 404 MG/DL — HIGH (ref 10–149)
TSH SERPL-MCNC: 1.4 UU/ML — SIGNIFICANT CHANGE UP (ref 0.34–4.82)
URATE UR-MCNC: 19.4 MG/DL — SIGNIFICANT CHANGE UP
VIT B12 SERPL-MCNC: 221 PG/ML — LOW (ref 232–1245)
WBC # BLD: 7.37 K/UL — SIGNIFICANT CHANGE UP (ref 3.8–10.5)
WBC # FLD AUTO: 7.37 K/UL — SIGNIFICANT CHANGE UP (ref 3.8–10.5)

## 2020-02-13 PROCEDURE — 93306 TTE W/DOPPLER COMPLETE: CPT | Mod: 26

## 2020-02-13 RX ORDER — INFLUENZA VIRUS VACCINE 15; 15; 15; 15 UG/.5ML; UG/.5ML; UG/.5ML; UG/.5ML
0.5 SUSPENSION INTRAMUSCULAR ONCE
Refills: 0 | Status: DISCONTINUED | OUTPATIENT
Start: 2020-02-13 | End: 2020-02-19

## 2020-02-13 RX ORDER — CARVEDILOL PHOSPHATE 80 MG/1
3.12 CAPSULE, EXTENDED RELEASE ORAL EVERY 12 HOURS
Refills: 0 | Status: DISCONTINUED | OUTPATIENT
Start: 2020-02-13 | End: 2020-02-16

## 2020-02-13 RX ADMIN — Medication 2: at 22:08

## 2020-02-13 RX ADMIN — PANTOPRAZOLE SODIUM 40 MILLIGRAM(S): 20 TABLET, DELAYED RELEASE ORAL at 05:40

## 2020-02-13 RX ADMIN — Medication 2: at 09:10

## 2020-02-13 RX ADMIN — ATORVASTATIN CALCIUM 40 MILLIGRAM(S): 80 TABLET, FILM COATED ORAL at 22:08

## 2020-02-13 RX ADMIN — CARVEDILOL PHOSPHATE 3.12 MILLIGRAM(S): 80 CAPSULE, EXTENDED RELEASE ORAL at 17:33

## 2020-02-13 RX ADMIN — INSULIN GLARGINE 10 UNIT(S): 100 INJECTION, SOLUTION SUBCUTANEOUS at 22:08

## 2020-02-13 RX ADMIN — BUDESONIDE AND FORMOTEROL FUMARATE DIHYDRATE 2 PUFF(S): 160; 4.5 AEROSOL RESPIRATORY (INHALATION) at 12:49

## 2020-02-13 RX ADMIN — Medication 1: at 17:33

## 2020-02-13 RX ADMIN — Medication 10 MILLIGRAM(S): at 17:33

## 2020-02-13 RX ADMIN — HEPARIN SODIUM 5000 UNIT(S): 5000 INJECTION INTRAVENOUS; SUBCUTANEOUS at 12:22

## 2020-02-13 RX ADMIN — Medication 81 MILLIGRAM(S): at 12:21

## 2020-02-13 RX ADMIN — TIOTROPIUM BROMIDE 1 CAPSULE(S): 18 CAPSULE ORAL; RESPIRATORY (INHALATION) at 12:21

## 2020-02-13 RX ADMIN — HEPARIN SODIUM 5000 UNIT(S): 5000 INJECTION INTRAVENOUS; SUBCUTANEOUS at 05:42

## 2020-02-13 RX ADMIN — HEPARIN SODIUM 5000 UNIT(S): 5000 INJECTION INTRAVENOUS; SUBCUTANEOUS at 22:09

## 2020-02-13 RX ADMIN — SACUBITRIL AND VALSARTAN 1 TABLET(S): 24; 26 TABLET, FILM COATED ORAL at 05:40

## 2020-02-13 RX ADMIN — Medication 10 MILLIGRAM(S): at 05:40

## 2020-02-13 RX ADMIN — Medication 1 TABLET(S): at 12:21

## 2020-02-13 RX ADMIN — Medication 1: at 12:22

## 2020-02-13 RX ADMIN — BUDESONIDE AND FORMOTEROL FUMARATE DIHYDRATE 2 PUFF(S): 160; 4.5 AEROSOL RESPIRATORY (INHALATION) at 22:07

## 2020-02-13 NOTE — PROGRESS NOTE ADULT - SUBJECTIVE AND OBJECTIVE BOX
Patient is a 79y old  Female who presents with a chief complaint of Worsening of Bilateral legs/SOB      INTERVAL HPI/OVERNIGHT EVENTS:  T(C): 36.2 (20 @ 05:28), Max: 36.7 (20 @ 13:32)  HR: 90 (20 @ 05:28) (90 - 123)  BP: 108/71 (20 @ 05:28) (95/63 - 135/83)  RR: 18 (20 @ 05:28) (16 - 18)  SpO2: 93% (20 @ 05:28) (92% - 99%)  Wt(kg): --    LABS:                        12.8   7.37  )-----------( 147      ( 2020 06:14 )             39.3     02-13    136  |  99  |  93<H>  ----------------------------<  151<H>  4.6   |  29  |  1.81<H>    Ca    9.5      2020 06:14  Phos  2.6       Mg     1.9         TPro  5.2<L>  /  Alb  2.7<L>  /  TBili  0.5  /  DBili  x   /  AST  10  /  ALT  20  /  AlkPhos  83  02-13      Urinalysis Basic - ( 2020 18:40 )    Color: Yellow / Appearance: Clear / S.010 / pH: x  Gluc: x / Ketone: Negative  / Bili: Negative / Urobili: Negative   Blood: x / Protein: Negative / Nitrite: Negative   Leuk Esterase: Trace / RBC: 2-5 /HPF / WBC 3-5 /HPF   Sq Epi: x / Non Sq Epi: Few /HPF / Bacteria: Moderate /HPF      CAPILLARY BLOOD GLUCOSE      POCT Blood Glucose.: 214 mg/dL (2020 09:03)  POCT Blood Glucose.: 221 mg/dL (2020 22:10)        RADIOLOGY & ADDITIONAL TESTS:  < from: US Duplex Venous Lower Ext Ltd, Left (20 @ 16:03) >    IMPRESSION:     No evidence of left lower extremity deep venous thrombosis.      < end of copied text >  < from: Xray Chest 1 View- PORTABLE-Urgent (20 @ 15:03) >  On 2019 there was some left basilar atelectases. These are largely clear with minimal residual in the left lateral costophrenic angle.    There was also an infiltrate in the right upper lung field on the earlier study not presently seen.    IMPRESSION: Improved lung findings from April of last year.    < end of copied text >    Consultant(s) Notes Reviewed:  [x ] YES  [ ] NO    PHYSICAL EXAM:  GENERAL: well built, well nourished  HEAD:  Atraumatic, Normocephalic  EYES: EOMI, PERRLA, conjunctiva and sclera clear  ENT: No tonsillar erythema, exudates, or enlargement; Moist mucous membranes, Good dentition, No lesions  NECK: Supple, No JVD, Normal thyroid, no enlarged nodes  NERVOUS SYSTEM:  Alert & Oriented X3, Good concentration; Motor Strength 5/5 B/L upper and lower extremities; DTRs 2+ intact and symmetric, sensory intact  CHEST/LUNG: B/L good air entry; positive rales, rhonchi, or wheezing  HEART: S1S2 normal, no S3, Regular rate and rhythm; positive  murmurs, rubs, or gallops  ABDOMEN: Soft, Nontender, Nondistended; Bowel sounds present  EXTREMITIES:  2+ Peripheral Pulses, No clubbing, cyanosis, B/L pitting edema left>right  LYMPH: No lymphadenopathy noted  SKIN: No rashes or lesions    Care Discussed with Consultants/Other Providers [ x] YES  [ ] NO

## 2020-02-13 NOTE — PROGRESS NOTE ADULT - PROBLEM SELECTOR PLAN 1
Pt is c/o Bilateral  leg swelling and  compliant with meds.  On exam fluid overloaded with bilateral crackles on lung exam and bilateral pitting edema.  proBNP on admission 720 with evidence of fluid overload   troponin  -ve  starting asa, statin   c/w Home medication Entresto  Started on Bumex 1mg q12  strict I/Os, daily weights  f/u echo  tele monitoring  cards eval - Dr. Nelson is consulted

## 2020-02-13 NOTE — PROGRESS NOTE ADULT - SUBJECTIVE AND OBJECTIVE BOX
[facility] : Progress Note :: [phone], (office [phone]),   Dr Barone / Dr Fontanez / Dr Schreiber / Dr Koo / Dr Harvey HOOKER / Dr Mcnally / Dr Hernandez / Dr Steven yip  _____________________________________________________________________________________________   feels better.  bilateral leg swelling better    No Known Drug Allergies  strawberry (Unknown)    Hospital Medications:   MEDICATIONS  (STANDING):  aspirin  chewable 81 milliGRAM(s) Oral daily  atorvastatin 40 milliGRAM(s) Oral at bedtime  budesonide  80 MICROgram(s)/formoterol 4.5 MICROgram(s) Inhaler 2 Puff(s) Inhalation two times a day  buMETAnide Injectable 1 milliGRAM(s) IV Push two times a day  carvedilol 3.125 milliGRAM(s) Oral every 12 hours  heparin  Injectable 5000 Unit(s) SubCutaneous every 8 hours  influenza   Vaccine 0.5 milliLiter(s) IntraMuscular once  insulin glargine Injectable (LANTUS) 10 Unit(s) SubCutaneous at bedtime  insulin lispro (HumaLOG) corrective regimen sliding scale   SubCutaneous Before meals and at bedtime  metolazone 5 milliGRAM(s) Oral daily  multivitamin/minerals 1 Tablet(s) Oral daily  pantoprazole    Tablet 40 milliGRAM(s) Oral before breakfast  predniSONE   Tablet 10 milliGRAM(s) Oral two times a day  sacubitril 49 mG/valsartan 51 mG 1 Tablet(s) Oral two times a day  tiotropium 18 MICROgram(s) Capsule 1 Capsule(s) Inhalation daily      VITALS:  T(F): 98 (20 @ 13:48), Max: 98 (20 @ 13:48)  HR: 96 (20 @ 17:30)  BP: 100/55 (20 @ 17:30)  RR: 17 (20 @ 13:48)  SpO2: 96% (20 @ 13:48)  Wt(kg): --      PHYSICAL EXAM:  Constitutional: NAD  HEENT: anicteric sclera, oropharynx clear  Neck: No JVD  Respiratory: CTAB, no wheezes, rales or rhonchi  Cardiovascular: S1, S2, RRR  Gastrointestinal: BS+, soft, NT/ND  Extremities: bilateral  peripheral edema+  Neurological: A/O x 3, no focal deficits  : No CVA tenderness. No osborne.       LABS:      136  |  99  |  93<H>  ----------------------------<  151<H>  4.6   |  29  |  1.81<H>    Ca    9.5      2020 06:14  Phos  2.6       Mg     1.9         TPro  5.2<L>  /  Alb  2.7<L>  /  TBili  0.5  /  DBili      /  AST  10  /  ALT  20  /  AlkPhos  83      Creatinine Trend: 1.81 <--, 2.09 <--                        12.8   7.37  )-----------( 147      ( 2020 06:14 )             39.3     Urine Studies:  Urinalysis Basic - ( 2020 18:40 )    Color: Yellow / Appearance: Clear / S.010 / pH:   Gluc:  / Ketone: Negative  / Bili: Negative / Urobili: Negative   Blood:  / Protein: Negative / Nitrite: Negative   Leuk Esterase: Trace / RBC: 2-5 /HPF / WBC 3-5 /HPF   Sq Epi:  / Non Sq Epi: Few /HPF / Bacteria: Moderate /HPF      Potassium, Random Urine: 17 mmol/L ( @ 18:40)  Sodium, Random Urine: 49 mmol/L ( @ 18:40)  Creatinine, Random Urine: 72 mg/dL ( @ 18:40)  Osmolality, Random Urine: 460 mos/kg ( @ 18:39)    RADIOLOGY & ADDITIONAL STUDIES:

## 2020-02-13 NOTE — CONSULT NOTE ADULT - SUBJECTIVE AND OBJECTIVE BOX
HISTORY OF PRESENT ILLNESS: HPI:  79F from home, lives with daughter, ambulates independently with  PMH COPD(3.5L), HFrEF(45%, Ischemic),CAD, Aortic dissection s/p repair and tissue AVR(on ASA 81mg), HTN, DM2 who presents with worsening leg swelling since 2 weeks. She was seen by her cardiologist 2 weeks back and was started on Entresto because she was hypertensive in the setting of reduced EF and also adjusted her Bumex, cutting down to half. Pt reports she is not eating, sleeping, not able to walk, no strength in her legs. She normally takes few steps but now she can barely take 2 steps and needs some assistance.. She is not using her walker since 1 yr. She is also using Meloxicam for muscle spasms one pill every day. She reports the leg swelling is worse on the left. Breathing is at baseline. She is compliant with medications. She denies any fever, chills, n/v/d, chest pain, palpitations, abdominal pain, dysuria, frequency.   No CP no LOC  GOC Full code (12 Feb 2020 17:44)      PAST MEDICAL & SURGICAL HISTORY:  Chronic CHF  H/O aortic valve replacement  DM (diabetes mellitus)  CAD (coronary artery disease)  COPD (chronic obstructive pulmonary disease)  Aortic valve prosthesis present          MEDICATIONS:  MEDICATIONS  (STANDING):  aspirin  chewable 81 milliGRAM(s) Oral daily  atorvastatin 40 milliGRAM(s) Oral at bedtime  budesonide  80 MICROgram(s)/formoterol 4.5 MICROgram(s) Inhaler 2 Puff(s) Inhalation two times a day  buMETAnide Injectable 1 milliGRAM(s) IV Push two times a day  carvedilol 3.125 milliGRAM(s) Oral every 12 hours  heparin  Injectable 5000 Unit(s) SubCutaneous every 8 hours  influenza   Vaccine 0.5 milliLiter(s) IntraMuscular once  insulin glargine Injectable (LANTUS) 10 Unit(s) SubCutaneous at bedtime  insulin lispro (HumaLOG) corrective regimen sliding scale   SubCutaneous Before meals and at bedtime  metolazone 5 milliGRAM(s) Oral daily  multivitamin/minerals 1 Tablet(s) Oral daily  pantoprazole    Tablet 40 milliGRAM(s) Oral before breakfast  predniSONE   Tablet 10 milliGRAM(s) Oral two times a day  sacubitril 49 mG/valsartan 51 mG 1 Tablet(s) Oral two times a day  tiotropium 18 MICROgram(s) Capsule 1 Capsule(s) Inhalation daily      Allergies    No Known Drug Allergies  strawberry (Unknown)    Intolerances        FAMILY HISTORY:    Non-contributary for premature coronary disease or sudden cardiac death    SOCIAL HISTORY:    [ X] Non-smoker  [ ] Smoker  [ ] Alcohol    FLU VACCINE THIS YEAR STARTS IN AUGUST:  [X ] Yes    [ ] No    IF OVER 65 HAVE YOU EVER HAD A PNA VACCINE:  [ X] Yes    [ ] No       [ ] N/A      REVIEW OF SYSTEMS:  [ ]chest pain  [X  ]shortness of breath  [  ]palpitations  [  ]syncope  [ ]near syncope [ ]upper extremity weakness   [ ] lower extremity weakness  [  ]diplopia  [  ]altered mental status   [  ]fevers  [ ]chills [ ]nausea  [ ]vomitting  [  ]dysphagia    [ ]abdominal pain  [ ]melena  [ ]BRBPR    [  ]epistaxis  [  ]rash    [ X]lower extremity edema        [ ] All others negative	  [x ] Unable to obtain      LABS:	 	    CARDIAC MARKERS:  CARDIAC MARKERS ( 12 Feb 2020 22:07 )  <0.015 ng/mL / x     / 27 U/L / x     / 2.4 ng/mL  CARDIAC MARKERS ( 12 Feb 2020 17:50 )  <0.015 ng/mL / x     / 28 U/L / x     / 2.6 ng/mL  CARDIAC MARKERS ( 12 Feb 2020 15:05 )  <0.015 ng/mL / x     / x     / x     / x                                  12.8   7.37  )-----------( 147      ( 13 Feb 2020 06:14 )             39.3     Hb Trend: 12.8<--, 14.5<--    02-13    136  |  99  |  93<H>  ----------------------------<  151<H>  4.6   |  29  |  1.81<H>    Ca    9.5      13 Feb 2020 06:14  Phos  2.6     02-13  Mg     1.9     02-13    TPro  5.2<L>  /  Alb  2.7<L>  /  TBili  0.5  /  DBili  x   /  AST  10  /  ALT  20  /  AlkPhos  83  02-13    Creatinine Trend: 1.81<--, 2.09<--    Coags:      proBNP: Serum Pro-Brain Natriuretic Peptide: 720 pg/mL (02-12 @ 15:05)    Lipid Profile:   HgA1c: Hemoglobin A1C, Whole Blood: 11.5 % (02-13 @ 10:20)    TSH: Thyroid Stimulating Hormone, Serum: 1.40 uU/mL (02-13 @ 06:14)          PHYSICAL EXAM:  T(C): 36.2 (02-13-20 @ 05:28), Max: 36.7 (02-12-20 @ 13:32)  HR: 90 (02-13-20 @ 05:28) (90 - 123)  BP: 108/71 (02-13-20 @ 05:28) (95/63 - 135/83)  RR: 18 (02-13-20 @ 05:28) (16 - 18)  SpO2: 93% (02-13-20 @ 05:28) (92% - 99%)  Wt(kg): --   BMI (kg/m2): 34.8 (02-12-20 @ 13:32)  I&O's Summary      Gen: Appears well in NAD  HEENT:  (-)icterus (-)pallor  CV: N S1 S2 1/6 ANNETTE (+)2 Pulses B/l  Resp:  Decreased at the bases  B/L, normal effort  GI: (+) BS Soft, NT, ND  Lymph:  (+)Edema, (-)obvious lymphadenopathy  Skin: Warm to touch, Normal turgor  Psych: Appropriate mood and affect        TELEMETRY: 	  Sinus / sinus tach    ECG:  	Sinus tach 101 BPM, LAE, LAD, nonspecific T wave abnormality    RADIOLOGY:         CXR:   < from: Xray Chest 1 View- PORTABLE-Urgent (02.12.20 @ 15:03) >  here was some left basilar atelectases. These are largely clear with minimal residual in the left lateral costophrenic angle.    There was also an infiltrate in the right upper lung field on the earlier study not presently seen.    < end of copied text >        ASSESSMENT/PLAN: 	79y Female PMH COPD(3.5L), HFrEF(45%, Ischemic),CAD, Aortic dissection s/p repair and tissue AVR(on ASA 81mg), HTN, DM2 who presents with worsening leg swelling since 2 weeks.    - Echo  - Keep net negative  - STICT I+Os  - Patient denies hx of stress, was likely cathed 10 years ago at the time of her benthal procedure.  will likely need ischemic eval pending above    I once again thank you for allowing me to participate in the care of your patient.  If you have any questions or concerns please do not hesitate to contact me.    Fan Nelson MD, [facility]  BEEPER [phone]

## 2020-02-13 NOTE — ADVANCED PRACTICE NURSE CONSULT - RECOMMEDATIONS
-Clean all affected areas with warm water, mild soap, pat dry, and apply skin prep to the surrounding skin  -Apply a Foam dressing to the Coccyx area Q 72hrs PRN  -Elevate/float the patients heels using heel protectors and reposition the patient Q 2hrs using wedges or pillows

## 2020-02-13 NOTE — ADVANCED PRACTICE NURSE CONSULT - ASSESSMENT
This is a 79yr old female patient admitted for Edema, presenting with the following:  -There is a Stage 1 Pressure Injury to the Bilateral Heels and Coccyx, as evident by non-blanchable erythema  -There is evidence of edema and ecchymosis to the Bilateral Upper and Lower Extremities

## 2020-02-13 NOTE — PROGRESS NOTE ADULT - SUBJECTIVE AND OBJECTIVE BOX
PGY1 Note discussed with Supervising Resident and Primary Attending.    Patient is a 79y old  Female who presents with a chief complaint of Worsening of Bilateral legs. (2020 17:44)      INTERVAL HPI/OVERNIGHT EVENTS :    No acute event overnight reported by overnight team and nurses.   bp in 100s ,hr in 90s.  Pt seen and examined  at bed side. All concerns and questions answered.   Report no new complaint. Pt denies any fever, nausea, vomiting.  pt has rales on both side and legs has pitting edema.  strict IOs.  ***********************************************************************************************************    MEDICATIONS  (STANDING):  aspirin  chewable 81 milliGRAM(s) Oral daily  atorvastatin 40 milliGRAM(s) Oral at bedtime  budesonide  80 MICROgram(s)/formoterol 4.5 MICROgram(s) Inhaler 2 Puff(s) Inhalation two times a day  buMETAnide Injectable 1 milliGRAM(s) IV Push two times a day  heparin  Injectable 5000 Unit(s) SubCutaneous every 8 hours  influenza   Vaccine 0.5 milliLiter(s) IntraMuscular once  insulin glargine Injectable (LANTUS) 10 Unit(s) SubCutaneous at bedtime  insulin lispro (HumaLOG) corrective regimen sliding scale   SubCutaneous Before meals and at bedtime  multivitamin/minerals 1 Tablet(s) Oral daily  pantoprazole    Tablet 40 milliGRAM(s) Oral before breakfast  predniSONE   Tablet 10 milliGRAM(s) Oral two times a day  sacubitril 49 mG/valsartan 51 mG 1 Tablet(s) Oral two times a day  tiotropium 18 MICROgram(s) Capsule 1 Capsule(s) Inhalation daily    MEDICATIONS  (PRN):  acetaminophen   Tablet .. 650 milliGRAM(s) Oral every 6 hours PRN Mild Pain (1 - 3)  ALBUTerol    90 MICROgram(s) HFA Inhaler 2 Puff(s) Inhalation every 6 hours PRN Shortness of Breath      ***********************************************************************************************************    Allergies    No Known Drug Allergies  strawberry (Unknown)    Intolerances        ***********************************************************************************************************    REVIEW OF SYSTEMS :  * CONSTITUTIONAL      : No Fever,   * EYES                             : No eye pain , Visual disturbances  * RESPIRATORY             : No , Wheezing,some No shortness of breath present  * CARDIOVASCULAR     : No Chest pain, Palpitations, Dizziness, + Leg swelling  * GASTROINTESTINAL  : No Abdominal or Epigastric pain. No Nausea, Vomiting or Hematemesis; No Diarrhea or Constipation.   * GENITOURINARY        : No Dysuria , Frequency ,   * NEUROLOGICAL          : No Headaches, Memory loss, Loss of strength,  * MUSCULOSKELETAL   : No Joint pain  * PSYCHIATRY                 : No Depression or Anxiety   * HEME/LYMPH              : No Easy Bruising   * SKIN                               : No Itching, Burning, chronic skin changes bilaterally    ***********************************************************************************************************    Vital Signs Last 24 Hrs  T(C): 36.2 (2020 05:28), Max: 36.7 (2020 13:32)  T(F): 97.1 (2020 05:28), Max: 98 (2020 13:32)  HR: 90 (2020 05:28) (90 - 123)  BP: 108/71 (2020 05:28) (95/63 - 135/83)  BP(mean): --  RR: 18 (2020 05:28) (16 - 18)  SpO2: 93% (2020 05:28) (92% - 99%)    ***********************************************************************************************************    PHYSICAL EXAM :  * GENERAL                 : pt in some distress ans c/o leg swelling sudden onset over last week  * HEAD                       :  Atraumatic, Normocephalic  * EYES                         :  Conjunctiva and Sclera clear  * ENT                           : Moist Mucous Membranes  * NECK                         : Supple, No JVD, Normal Thyroid  * CHEST/LUNG           : rales to Auscultation bilaterally;  * HEART                       : Regular Rate and Rhythm; No murmurs,   * ABDOMEN                : Soft, Non-tender, Non-distended; Bowel Sounds present  * NERVOUS SYSTEM  :  Alert & Oriented X3, Good Concentration;  * EXTREMITIES            :  2+ Peripheral Pulses, No clubbing, cyanosis,+ pitting  edema  * SKIN                           :chronic skin changes bilaterrally on legs.    **********************************************************************************************************  LABS:                          12.8   7.37  )-----------( 147      ( 2020 06:14 )             39.3     02-13    136  |  99  |  93<H>  ----------------------------<  151<H>  4.6   |  29  |  1.81<H>    Ca    9.5      2020 06:14  Phos  2.6     02-  Mg     1.9     -    TPro  5.2<L>  /  Alb  2.7<L>  /  TBili  0.5  /  DBili  x   /  AST  10  /  ALT  20  /  AlkPhos  83  02-13      Urinalysis Basic - ( 2020 18:40 )    Color: Yellow / Appearance: Clear / S.010 / pH: x  Gluc: x / Ketone: Negative  / Bili: Negative / Urobili: Negative   Blood: x / Protein: Negative / Nitrite: Negative   Leuk Esterase: Trace / RBC: 2-5 /HPF / WBC 3-5 /HPF   Sq Epi: x / Non Sq Epi: Few /HPF / Bacteria: Moderate /HPF      CAPILLARY BLOOD GLUCOSE      POCT Blood Glucose.: 214 mg/dL (2020 09:03)  POCT Blood Glucose.: 221 mg/dL (2020 22:10)      **********************************************************************************************************    RADIOLOGY & ADDITIONAL TESTS:   No radiological imaging was required    Imaging Personally Reviewed   :  [ x] YES  [ ] NO    Consultant(s) Notes Reviewed :  [x ] YES  [ ] NO

## 2020-02-14 LAB
ALBUMIN SERPL ELPH-MCNC: 2.6 G/DL — LOW (ref 3.5–5)
ALP SERPL-CCNC: 89 U/L — SIGNIFICANT CHANGE UP (ref 40–120)
ALT FLD-CCNC: 22 U/L DA — SIGNIFICANT CHANGE UP (ref 10–60)
ANION GAP SERPL CALC-SCNC: 5 MMOL/L — SIGNIFICANT CHANGE UP (ref 5–17)
AST SERPL-CCNC: 11 U/L — SIGNIFICANT CHANGE UP (ref 10–40)
BASOPHILS # BLD AUTO: 0.02 K/UL — SIGNIFICANT CHANGE UP (ref 0–0.2)
BASOPHILS NFR BLD AUTO: 0.3 % — SIGNIFICANT CHANGE UP (ref 0–2)
BILIRUB SERPL-MCNC: 0.6 MG/DL — SIGNIFICANT CHANGE UP (ref 0.2–1.2)
BUN SERPL-MCNC: 68 MG/DL — HIGH (ref 7–18)
CALCIUM SERPL-MCNC: 9.5 MG/DL — SIGNIFICANT CHANGE UP (ref 8.4–10.5)
CHLORIDE SERPL-SCNC: 101 MMOL/L — SIGNIFICANT CHANGE UP (ref 96–108)
CO2 SERPL-SCNC: 30 MMOL/L — SIGNIFICANT CHANGE UP (ref 22–31)
CREAT SERPL-MCNC: 1.06 MG/DL — SIGNIFICANT CHANGE UP (ref 0.5–1.3)
EOSINOPHIL # BLD AUTO: 0.02 K/UL — SIGNIFICANT CHANGE UP (ref 0–0.5)
EOSINOPHIL NFR BLD AUTO: 0.3 % — SIGNIFICANT CHANGE UP (ref 0–6)
GLUCOSE BLDC GLUCOMTR-MCNC: 198 MG/DL — HIGH (ref 70–99)
GLUCOSE BLDC GLUCOMTR-MCNC: 223 MG/DL — HIGH (ref 70–99)
GLUCOSE BLDC GLUCOMTR-MCNC: 263 MG/DL — HIGH (ref 70–99)
GLUCOSE BLDC GLUCOMTR-MCNC: 263 MG/DL — HIGH (ref 70–99)
GLUCOSE SERPL-MCNC: 170 MG/DL — HIGH (ref 70–99)
HCT VFR BLD CALC: 40.3 % — SIGNIFICANT CHANGE UP (ref 34.5–45)
HGB BLD-MCNC: 13.1 G/DL — SIGNIFICANT CHANGE UP (ref 11.5–15.5)
IMM GRANULOCYTES NFR BLD AUTO: 4.3 % — HIGH (ref 0–1.5)
LYMPHOCYTES # BLD AUTO: 0.57 K/UL — LOW (ref 1–3.3)
LYMPHOCYTES # BLD AUTO: 7.4 % — LOW (ref 13–44)
MAGNESIUM SERPL-MCNC: 2.2 MG/DL — SIGNIFICANT CHANGE UP (ref 1.6–2.6)
MCHC RBC-ENTMCNC: 28.6 PG — SIGNIFICANT CHANGE UP (ref 27–34)
MCHC RBC-ENTMCNC: 32.5 GM/DL — SIGNIFICANT CHANGE UP (ref 32–36)
MCV RBC AUTO: 88 FL — SIGNIFICANT CHANGE UP (ref 80–100)
MONOCYTES # BLD AUTO: 0.39 K/UL — SIGNIFICANT CHANGE UP (ref 0–0.9)
MONOCYTES NFR BLD AUTO: 5.1 % — SIGNIFICANT CHANGE UP (ref 2–14)
NEUTROPHILS # BLD AUTO: 6.35 K/UL — SIGNIFICANT CHANGE UP (ref 1.8–7.4)
NEUTROPHILS NFR BLD AUTO: 82.6 % — HIGH (ref 43–77)
NRBC # BLD: 0 /100 WBCS — SIGNIFICANT CHANGE UP (ref 0–0)
PHOSPHATE SERPL-MCNC: 3.4 MG/DL — SIGNIFICANT CHANGE UP (ref 2.5–4.5)
PLATELET # BLD AUTO: 124 K/UL — LOW (ref 150–400)
POTASSIUM SERPL-MCNC: 4.5 MMOL/L — SIGNIFICANT CHANGE UP (ref 3.5–5.3)
POTASSIUM SERPL-SCNC: 4.5 MMOL/L — SIGNIFICANT CHANGE UP (ref 3.5–5.3)
PROT SERPL-MCNC: 5.5 G/DL — LOW (ref 6–8.3)
RBC # BLD: 4.58 M/UL — SIGNIFICANT CHANGE UP (ref 3.8–5.2)
RBC # FLD: 15.4 % — HIGH (ref 10.3–14.5)
SODIUM SERPL-SCNC: 136 MMOL/L — SIGNIFICANT CHANGE UP (ref 135–145)
WBC # BLD: 7.68 K/UL — SIGNIFICANT CHANGE UP (ref 3.8–10.5)
WBC # FLD AUTO: 7.68 K/UL — SIGNIFICANT CHANGE UP (ref 3.8–10.5)

## 2020-02-14 RX ORDER — SOD,AMMONIUM,POTASSIUM LACTATE
1 CREAM (GRAM) TOPICAL
Refills: 0 | Status: DISCONTINUED | OUTPATIENT
Start: 2020-02-14 | End: 2020-02-19

## 2020-02-14 RX ADMIN — PANTOPRAZOLE SODIUM 40 MILLIGRAM(S): 20 TABLET, DELAYED RELEASE ORAL at 05:47

## 2020-02-14 RX ADMIN — INSULIN GLARGINE 10 UNIT(S): 100 INJECTION, SOLUTION SUBCUTANEOUS at 21:35

## 2020-02-14 RX ADMIN — Medication 10 MILLIGRAM(S): at 17:43

## 2020-02-14 RX ADMIN — Medication 3: at 12:04

## 2020-02-14 RX ADMIN — HEPARIN SODIUM 5000 UNIT(S): 5000 INJECTION INTRAVENOUS; SUBCUTANEOUS at 12:04

## 2020-02-14 RX ADMIN — ATORVASTATIN CALCIUM 40 MILLIGRAM(S): 80 TABLET, FILM COATED ORAL at 21:35

## 2020-02-14 RX ADMIN — Medication 3: at 17:43

## 2020-02-14 RX ADMIN — HEPARIN SODIUM 5000 UNIT(S): 5000 INJECTION INTRAVENOUS; SUBCUTANEOUS at 05:47

## 2020-02-14 RX ADMIN — BUMETANIDE 1 MILLIGRAM(S): 0.25 INJECTION INTRAMUSCULAR; INTRAVENOUS at 05:48

## 2020-02-14 RX ADMIN — BUDESONIDE AND FORMOTEROL FUMARATE DIHYDRATE 2 PUFF(S): 160; 4.5 AEROSOL RESPIRATORY (INHALATION) at 12:05

## 2020-02-14 RX ADMIN — Medication 1 TABLET(S): at 12:04

## 2020-02-14 RX ADMIN — TIOTROPIUM BROMIDE 1 CAPSULE(S): 18 CAPSULE ORAL; RESPIRATORY (INHALATION) at 12:04

## 2020-02-14 RX ADMIN — SACUBITRIL AND VALSARTAN 1 TABLET(S): 24; 26 TABLET, FILM COATED ORAL at 17:43

## 2020-02-14 RX ADMIN — Medication 1 APPLICATION(S): at 17:43

## 2020-02-14 RX ADMIN — Medication 10 MILLIGRAM(S): at 05:46

## 2020-02-14 RX ADMIN — CARVEDILOL PHOSPHATE 3.12 MILLIGRAM(S): 80 CAPSULE, EXTENDED RELEASE ORAL at 17:43

## 2020-02-14 RX ADMIN — CARVEDILOL PHOSPHATE 3.12 MILLIGRAM(S): 80 CAPSULE, EXTENDED RELEASE ORAL at 05:46

## 2020-02-14 RX ADMIN — BUDESONIDE AND FORMOTEROL FUMARATE DIHYDRATE 2 PUFF(S): 160; 4.5 AEROSOL RESPIRATORY (INHALATION) at 21:35

## 2020-02-14 RX ADMIN — Medication 1: at 08:29

## 2020-02-14 RX ADMIN — HEPARIN SODIUM 5000 UNIT(S): 5000 INJECTION INTRAVENOUS; SUBCUTANEOUS at 21:35

## 2020-02-14 RX ADMIN — Medication 81 MILLIGRAM(S): at 12:04

## 2020-02-14 RX ADMIN — Medication 2: at 21:35

## 2020-02-14 NOTE — PROGRESS NOTE ADULT - SUBJECTIVE AND OBJECTIVE BOX
Patient is a 79y old  Female who presents with a chief complaint of Worsening of Bilateral legs/SOB      INTERVAL HPI/OVERNIGHT EVENTS:  T(C): 36.3 (20 @ 05:33), Max: 36.8 (20 @ 01:22)  HR: 72 (20 @ 05:55) (72 - 96)  BP: 110/76 (20 @ 05:55) (92/59 - 126/77)  RR: 17 (20 @ 05:33) (17 - 17)  SpO2: 97% (20 @ 05:33) (96% - 98%)  Wt(kg): --    LABS:                        13.1   7.68  )-----------( 124      ( 2020 07:23 )             40.3         136  |  101  |  68<H>  ----------------------------<  170<H>  4.5   |  30  |  1.06    Ca    9.5      2020 07:23  Phos  3.4       Mg     2.2         TPro  5.5<L>  /  Alb  2.6<L>  /  TBili  0.6  /  DBili  x   /  AST  11  /  ALT  22  /  AlkPhos  89  -14      Urinalysis Basic - ( 2020 18:40 )    Color: Yellow / Appearance: Clear / S.010 / pH: x  Gluc: x / Ketone: Negative  / Bili: Negative / Urobili: Negative   Blood: x / Protein: Negative / Nitrite: Negative   Leuk Esterase: Trace / RBC: 2-5 /HPF / WBC 3-5 /HPF   Sq Epi: x / Non Sq Epi: Few /HPF / Bacteria: Moderate /HPF      CAPILLARY BLOOD GLUCOSE      POCT Blood Glucose.: 198 mg/dL (2020 07:34)  POCT Blood Glucose.: 240 mg/dL (2020 21:47)  POCT Blood Glucose.: 189 mg/dL (2020 16:42)  POCT Blood Glucose.: 177 mg/dL (2020 11:46)        RADIOLOGY & ADDITIONAL TESTS:    Consultant(s) Notes Reviewed:  [x ] YES  [ ] NO    PHYSICAL EXAM:  GENERAL: well built, well nourished  HEAD:  Atraumatic, Normocephalic  EYES: EOMI, PERRLA, conjunctiva and sclera clear  ENT: No tonsillar erythema, exudates, or enlargement; Moist mucous membranes, Good dentition, No lesions  NECK: Supple, No JVD, Normal thyroid, no enlarged nodes  NERVOUS SYSTEM:  Alert & Oriented X3, Good concentration; Motor Strength 5/5 B/L upper and lower extremities; DTRs 2+ intact and symmetric, sensory intact  CHEST/LUNG: B/L good air entry; less  rales, rhonchi, or wheezing  HEART: S1S2 normal, no S3, Regular rate and rhythm; No murmurs, rubs, or gallops  ABDOMEN: Soft, Nontender, Nondistended; Bowel sounds present  EXTREMITIES:  2+ Peripheral Pulses, No clubbing, cyanosis, less  edema  LYMPH: No lymphadenopathy noted  SKIN: No rashes or lesions    Care Discussed with Consultants/Other Providers [ x] YES  [ ] NO

## 2020-02-14 NOTE — PROGRESS NOTE ADULT - PROBLEM SELECTOR PLAN 1
Pt is c/o Bilateral  leg swelling and  compliant with meds.  On exam fluid overloaded with bilateral crackles on lung exam and bilateral pitting edema.  proBNP on admission 720 with evidence of fluid overload   troponin  -ve  starting asa, statin   c/w Home medication Entresto  Started on Bumex 1mg q12  METOLAZONE   strict I/Os, daily weights   echo SHOWS G1DD, normal left ventricle thickness and dimentions.  tele monitoring  cards eval - Dr. Nelson is consulted  pending ischemic eval

## 2020-02-14 NOTE — PROGRESS NOTE ADULT - SUBJECTIVE AND OBJECTIVE BOX
Patient denies chest pain or shortness of breath.   Review of systems otherwise (-)  	  MEDICATIONS:  MEDICATIONS  (STANDING):  ammonium lactate 12% Lotion 1 Application(s) Topical two times a day  aspirin  chewable 81 milliGRAM(s) Oral daily  atorvastatin 40 milliGRAM(s) Oral at bedtime  budesonide  80 MICROgram(s)/formoterol 4.5 MICROgram(s) Inhaler 2 Puff(s) Inhalation two times a day  buMETAnide Injectable 1 milliGRAM(s) IV Push two times a day  carvedilol 3.125 milliGRAM(s) Oral every 12 hours  heparin  Injectable 5000 Unit(s) SubCutaneous every 8 hours  influenza   Vaccine 0.5 milliLiter(s) IntraMuscular once  insulin glargine Injectable (LANTUS) 10 Unit(s) SubCutaneous at bedtime  insulin lispro (HumaLOG) corrective regimen sliding scale   SubCutaneous Before meals and at bedtime  metolazone 5 milliGRAM(s) Oral daily  multivitamin/minerals 1 Tablet(s) Oral daily  pantoprazole    Tablet 40 milliGRAM(s) Oral before breakfast  predniSONE   Tablet 10 milliGRAM(s) Oral two times a day  sacubitril 49 mG/valsartan 51 mG 1 Tablet(s) Oral two times a day  tiotropium 18 MICROgram(s) Capsule 1 Capsule(s) Inhalation daily      LABS:	 	    CARDIAC MARKERS:  CARDIAC MARKERS ( 12 Feb 2020 22:07 )  <0.015 ng/mL / x     / 27 U/L / x     / 2.4 ng/mL  CARDIAC MARKERS ( 12 Feb 2020 17:50 )  <0.015 ng/mL / x     / 28 U/L / x     / 2.6 ng/mL  CARDIAC MARKERS ( 12 Feb 2020 15:05 )  <0.015 ng/mL / x     / x     / x     / x                                    13.1   7.68  )-----------( 124      ( 14 Feb 2020 07:23 )             40.3     Hemoglobin: 13.1 g/dL (02-14 @ 07:23)  Hemoglobin: 12.8 g/dL (02-13 @ 06:14)  Hemoglobin: 14.5 g/dL (02-12 @ 15:05)      02-14    136  |  101  |  68<H>  ----------------------------<  170<H>  4.5   |  30  |  1.06    Ca    9.5      14 Feb 2020 07:23  Phos  3.4     02-14  Mg     2.2     02-14    TPro  5.5<L>  /  Alb  2.6<L>  /  TBili  0.6  /  DBili  x   /  AST  11  /  ALT  22  /  AlkPhos  89  02-14    Creatinine Trend: 1.06<--, 1.81<--, 2.09<--    PHYSICAL EXAM:  T(C): 36.4 (02-14-20 @ 10:52), Max: 36.8 (02-14-20 @ 01:22)  HR: 77 (02-14-20 @ 10:52) (72 - 96)  BP: 102/53 (02-14-20 @ 10:52) (92/59 - 126/77)  RR: 17 (02-14-20 @ 10:52) (17 - 17)  SpO2: 100% (02-14-20 @ 10:52) (96% - 100%)  Wt(kg): --  I&O's Summary        Gen: Appears well in NAD  HEENT:  (-)icterus (-)pallor  CV: N S1 S2 1/6 ANNETTE (+)2 Pulses B/l  Resp:  Clear to ausculatation B/L, normal effort  GI: (+) BS Soft, NT, ND  Lymph:  (+)Edema, (-)obvious lymphadenopathy  Skin: Warm to touch, Normal turgor  Psych: Appropriate mood and affect      TELEMETRY: 	  Sinus PVC    ASSESSMENT/PLAN: 	79y  Female PMH COPD(3.5L), HFrEF(45%, Ischemic),CAD, Aortic dissection s/p repair and tissue AVR(on ASA 81mg), HTN, DM2 who presents with worsening leg swelling since 2 weeks.    - awaiting Echo  - Keep net negative  - STICT I+Os  - Patient denies hx of stress, was likely cathed 10 years ago at the time of her benthal procedure.  will likely need ischemic eval pending above    Fan Nelson MD, [facility]  BEEPER [phone]

## 2020-02-14 NOTE — PROGRESS NOTE ADULT - SUBJECTIVE AND OBJECTIVE BOX
PGY1 Note discussed with Supervising Resident and Primary Attending.    Patient is a 79y old  Female who presents with a chief complaint of Worsening of Bilateral legs. (2020 12:53)  HPI:  79F from home, lives with daughter, ambulates independently with  PMH COPD(3.5L), HFrEF(45%, Ischemic),CAD, Aortic dissection s/p AVR(on ASA 81mg), HTN, DM2 who presents with worsening leg swelling since 2 weeks. She was seen by her cardiologist 2 weeks back and was started on Entresto because she was hypertensive in the setting of reduced EF and also adjusted her Bumex, cutting down to half. Pt reports she is not eating, sleeping, not able to walk, no strength in her legs. She normally takes few steps but now she can barely take 2 steps and needs some assistance.. She is not using her walker since 1 yr. She is also using Meloxicam for muscle spasms one pill every day. She reports the leg swelling is worse on the left. Breathing is at baseline. She is compliant with medications. She denies any fever, chills, n/v/d, chest pain, palpitations, abdominal pain, dysuria, frequency.   [facility] Full code (2020 17:44)        INTERVAL HPI/OVERNIGHT EVENTS :    No acute event overnight reported by overnight team and nurses. Pt remained hemodynamically stable.  Pt seen and examined  at bed side. All concerns and questions answered.   Report no new complaint. Pt denies any fever, nausea, vomiting.  Explained all test results and plan.  Appreciate cardiology consults.   pt pending [facility] placenment    ***********************************************************************************************************    MEDICATIONS  (STANDING):  ammonium lactate 12% Lotion 1 Application(s) Topical two times a day  aspirin  chewable 81 milliGRAM(s) Oral daily  atorvastatin 40 milliGRAM(s) Oral at bedtime  budesonide  80 MICROgram(s)/formoterol 4.5 MICROgram(s) Inhaler 2 Puff(s) Inhalation two times a day  buMETAnide Injectable 1 milliGRAM(s) IV Push two times a day  carvedilol 3.125 milliGRAM(s) Oral every 12 hours  heparin  Injectable 5000 Unit(s) SubCutaneous every 8 hours  influenza   Vaccine 0.5 milliLiter(s) IntraMuscular once  insulin glargine Injectable (LANTUS) 10 Unit(s) SubCutaneous at bedtime  insulin lispro (HumaLOG) corrective regimen sliding scale   SubCutaneous Before meals and at bedtime  metolazone 5 milliGRAM(s) Oral daily  multivitamin/minerals 1 Tablet(s) Oral daily  pantoprazole    Tablet 40 milliGRAM(s) Oral before breakfast  predniSONE   Tablet 10 milliGRAM(s) Oral two times a day  sacubitril 49 mG/valsartan 51 mG 1 Tablet(s) Oral two times a day  tiotropium 18 MICROgram(s) Capsule 1 Capsule(s) Inhalation daily    MEDICATIONS  (PRN):  acetaminophen   Tablet .. 650 milliGRAM(s) Oral every 6 hours PRN Mild Pain (1 - 3)  ALBUTerol    90 MICROgram(s) HFA Inhaler 2 Puff(s) Inhalation every 6 hours PRN Shortness of Breath      ***********************************************************************************************************    Allergies    No Known Drug Allergies  strawberry (Unknown)    Intolerances        ***********************************************************************************************************    REVIEW OF SYSTEMS :  CONSTITUTIONAL      : No Fever,   * EYES                             : No eye pain , Visual disturbances  * RESPIRATORY             :rales present  * CARDIOVASCULAR     : No Chest pain, Palpitations, Dizziness, + Leg swelling  * GASTROINTESTINAL  : No Abdominal or Epigastric pain. No Nausea, Vomiting or Hematemesis; No Diarrhea or Constipation.   * GENITOURINARY        : No Dysuria , Frequency ,   * NEUROLOGICAL          : No Headaches, Memory loss, Loss of strength,  * MUSCULOSKELETAL   : No Joint pain  * PSYCHIATRY                 : No Depression or Anxiety   * HEME/LYMPH              : No Easy Bruising   * SKIN                               : No Itching, Burning, +chronic skin changes bilaterally    ***********************************************************************************************************    Vital Signs Last 24 Hrs  T(C): 36.2 (2020 14:43), Max: 36.8 (2020 01:22)  T(F): 97.1 (2020 14:43), Max: 98.2 (2020 01:22)  HR: 84 (2020 14:43) (72 - 96)  BP: 115/68 (2020 14:43) (92/59 - 126/77)  BP(mean): --  RR: 17 (2020 14:43) (17 - 17)  SpO2: 96% (2020 14:43) (96% - 100%)    ***********************************************************************************************************    PHYSICAL EXAM :  *GENERAL                 : pleasant lady ,NAD  * HEAD                       :  Atraumatic, Normocephalic  * EYES                         :  Conjunctiva and Sclera clear  * ENT                           : Moist Mucous Membranes  * NECK                         : Supple, No JVD, Normal Thyroid  * CHEST/LUNG           : rales to Auscultation bilaterally;  * HEART                       : Regular Rate and Rhythm; No murmurs,   * ABDOMEN                : Soft, Non-tender, Non-distended; Bowel Sounds present  * NERVOUS SYSTEM  :  Alert & Oriented X3, Good Concentration;  * EXTREMITIES            :  2+ Peripheral Pulses, No clubbing, cyanosis,+ pitting  edema  * SKIN                           :chronic skin changes bilaterrally on legs.    **********************************************************************************************************  LABS:                          13.1   7.68  )-----------( 124      ( 2020 07:23 )             40.3     02-14    136  |  101  |  68<H>  ----------------------------<  170<H>  4.5   |  30  |  1.06    Ca    9.5      2020 07:23  Phos  3.4     02-14  Mg     2.2     -14    TPro  5.5<L>  /  Alb  2.6<L>  /  TBili  0.6  /  DBili  x   /  AST  11  /  ALT  22  /  AlkPhos  89  02-14      Urinalysis Basic - ( 2020 18:40 )    Color: Yellow / Appearance: Clear / S.010 / pH: x  Gluc: x / Ketone: Negative  / Bili: Negative / Urobili: Negative   Blood: x / Protein: Negative / Nitrite: Negative   Leuk Esterase: Trace / RBC: 2-5 /HPF / WBC 3-5 /HPF   Sq Epi: x / Non Sq Epi: Few /HPF / Bacteria: Moderate /HPF      CAPILLARY BLOOD GLUCOSE      POCT Blood Glucose.: 263 mg/dL (2020 16:48)  POCT Blood Glucose.: 263 mg/dL (2020 11:34)  POCT Blood Glucose.: 198 mg/dL (2020 07:34)  POCT Blood Glucose.: 240 mg/dL (2020 21:47)      **********************************************************************************************************    RADIOLOGY & ADDITIONAL TESTS:   No radiological imaging was required    Imaging Personally Reviewed   :  [ X] YES  [ ] NO    Consultant(s) Notes Reviewed :  [ X] YES  [ ] NO

## 2020-02-15 LAB
ALBUMIN SERPL ELPH-MCNC: 2.6 G/DL — LOW (ref 3.5–5)
ALP SERPL-CCNC: 96 U/L — SIGNIFICANT CHANGE UP (ref 40–120)
ALT FLD-CCNC: 24 U/L DA — SIGNIFICANT CHANGE UP (ref 10–60)
ANION GAP SERPL CALC-SCNC: 5 MMOL/L — SIGNIFICANT CHANGE UP (ref 5–17)
AST SERPL-CCNC: 11 U/L — SIGNIFICANT CHANGE UP (ref 10–40)
BASOPHILS # BLD AUTO: 0.02 K/UL — SIGNIFICANT CHANGE UP (ref 0–0.2)
BASOPHILS NFR BLD AUTO: 0.3 % — SIGNIFICANT CHANGE UP (ref 0–2)
BILIRUB SERPL-MCNC: 0.5 MG/DL — SIGNIFICANT CHANGE UP (ref 0.2–1.2)
BUN SERPL-MCNC: 53 MG/DL — HIGH (ref 7–18)
CALCIUM SERPL-MCNC: 9.5 MG/DL — SIGNIFICANT CHANGE UP (ref 8.4–10.5)
CHLORIDE SERPL-SCNC: 100 MMOL/L — SIGNIFICANT CHANGE UP (ref 96–108)
CO2 SERPL-SCNC: 33 MMOL/L — HIGH (ref 22–31)
CREAT SERPL-MCNC: 0.85 MG/DL — SIGNIFICANT CHANGE UP (ref 0.5–1.3)
EOSINOPHIL # BLD AUTO: 0.01 K/UL — SIGNIFICANT CHANGE UP (ref 0–0.5)
EOSINOPHIL NFR BLD AUTO: 0.2 % — SIGNIFICANT CHANGE UP (ref 0–6)
GLUCOSE BLDC GLUCOMTR-MCNC: 160 MG/DL — HIGH (ref 70–99)
GLUCOSE BLDC GLUCOMTR-MCNC: 186 MG/DL — HIGH (ref 70–99)
GLUCOSE BLDC GLUCOMTR-MCNC: 202 MG/DL — HIGH (ref 70–99)
GLUCOSE BLDC GLUCOMTR-MCNC: 235 MG/DL — HIGH (ref 70–99)
GLUCOSE SERPL-MCNC: 208 MG/DL — HIGH (ref 70–99)
HCT VFR BLD CALC: 38.4 % — SIGNIFICANT CHANGE UP (ref 34.5–45)
HGB BLD-MCNC: 12.5 G/DL — SIGNIFICANT CHANGE UP (ref 11.5–15.5)
IMM GRANULOCYTES NFR BLD AUTO: 5.2 % — HIGH (ref 0–1.5)
LYMPHOCYTES # BLD AUTO: 0.45 K/UL — LOW (ref 1–3.3)
LYMPHOCYTES # BLD AUTO: 6.9 % — LOW (ref 13–44)
MAGNESIUM SERPL-MCNC: 2 MG/DL — SIGNIFICANT CHANGE UP (ref 1.6–2.6)
MCHC RBC-ENTMCNC: 28.3 PG — SIGNIFICANT CHANGE UP (ref 27–34)
MCHC RBC-ENTMCNC: 32.6 GM/DL — SIGNIFICANT CHANGE UP (ref 32–36)
MCV RBC AUTO: 86.9 FL — SIGNIFICANT CHANGE UP (ref 80–100)
MONOCYTES # BLD AUTO: 0.28 K/UL — SIGNIFICANT CHANGE UP (ref 0–0.9)
MONOCYTES NFR BLD AUTO: 4.3 % — SIGNIFICANT CHANGE UP (ref 2–14)
NEUTROPHILS # BLD AUTO: 5.4 K/UL — SIGNIFICANT CHANGE UP (ref 1.8–7.4)
NEUTROPHILS NFR BLD AUTO: 83.1 % — HIGH (ref 43–77)
NRBC # BLD: 0 /100 WBCS — SIGNIFICANT CHANGE UP (ref 0–0)
PHOSPHATE SERPL-MCNC: 2.7 MG/DL — SIGNIFICANT CHANGE UP (ref 2.5–4.5)
PLATELET # BLD AUTO: 123 K/UL — LOW (ref 150–400)
POTASSIUM SERPL-MCNC: 4.3 MMOL/L — SIGNIFICANT CHANGE UP (ref 3.5–5.3)
POTASSIUM SERPL-SCNC: 4.3 MMOL/L — SIGNIFICANT CHANGE UP (ref 3.5–5.3)
PROT SERPL-MCNC: 5.6 G/DL — LOW (ref 6–8.3)
RBC # BLD: 4.42 M/UL — SIGNIFICANT CHANGE UP (ref 3.8–5.2)
RBC # FLD: 15.1 % — HIGH (ref 10.3–14.5)
SODIUM SERPL-SCNC: 138 MMOL/L — SIGNIFICANT CHANGE UP (ref 135–145)
WBC # BLD: 6.5 K/UL — SIGNIFICANT CHANGE UP (ref 3.8–10.5)
WBC # FLD AUTO: 6.5 K/UL — SIGNIFICANT CHANGE UP (ref 3.8–10.5)

## 2020-02-15 RX ORDER — INSULIN LISPRO 100/ML
1 VIAL (ML) SUBCUTANEOUS
Refills: 0 | Status: DISCONTINUED | OUTPATIENT
Start: 2020-02-15 | End: 2020-02-19

## 2020-02-15 RX ORDER — INSULIN GLARGINE 100 [IU]/ML
12 INJECTION, SOLUTION SUBCUTANEOUS AT BEDTIME
Refills: 0 | Status: DISCONTINUED | OUTPATIENT
Start: 2020-02-15 | End: 2020-02-19

## 2020-02-15 RX ORDER — BUMETANIDE 0.25 MG/ML
1 INJECTION INTRAMUSCULAR; INTRAVENOUS
Refills: 0 | Status: DISCONTINUED | OUTPATIENT
Start: 2020-02-15 | End: 2020-02-19

## 2020-02-15 RX ADMIN — ATORVASTATIN CALCIUM 40 MILLIGRAM(S): 80 TABLET, FILM COATED ORAL at 22:51

## 2020-02-15 RX ADMIN — TIOTROPIUM BROMIDE 1 CAPSULE(S): 18 CAPSULE ORAL; RESPIRATORY (INHALATION) at 11:48

## 2020-02-15 RX ADMIN — SACUBITRIL AND VALSARTAN 1 TABLET(S): 24; 26 TABLET, FILM COATED ORAL at 17:37

## 2020-02-15 RX ADMIN — INSULIN GLARGINE 12 UNIT(S): 100 INJECTION, SOLUTION SUBCUTANEOUS at 22:50

## 2020-02-15 RX ADMIN — Medication 1 APPLICATION(S): at 17:37

## 2020-02-15 RX ADMIN — Medication 2: at 08:27

## 2020-02-15 RX ADMIN — SACUBITRIL AND VALSARTAN 1 TABLET(S): 24; 26 TABLET, FILM COATED ORAL at 07:06

## 2020-02-15 RX ADMIN — BUMETANIDE 1 MILLIGRAM(S): 0.25 INJECTION INTRAMUSCULAR; INTRAVENOUS at 07:05

## 2020-02-15 RX ADMIN — Medication 10 MILLIGRAM(S): at 17:36

## 2020-02-15 RX ADMIN — Medication 1 APPLICATION(S): at 07:04

## 2020-02-15 RX ADMIN — Medication 1: at 17:36

## 2020-02-15 RX ADMIN — Medication 10 MILLIGRAM(S): at 07:06

## 2020-02-15 RX ADMIN — Medication 1: at 11:48

## 2020-02-15 RX ADMIN — Medication 1 TABLET(S): at 11:47

## 2020-02-15 RX ADMIN — BUDESONIDE AND FORMOTEROL FUMARATE DIHYDRATE 2 PUFF(S): 160; 4.5 AEROSOL RESPIRATORY (INHALATION) at 22:51

## 2020-02-15 RX ADMIN — HEPARIN SODIUM 5000 UNIT(S): 5000 INJECTION INTRAVENOUS; SUBCUTANEOUS at 13:30

## 2020-02-15 RX ADMIN — Medication 2: at 22:50

## 2020-02-15 RX ADMIN — BUMETANIDE 1 MILLIGRAM(S): 0.25 INJECTION INTRAMUSCULAR; INTRAVENOUS at 17:36

## 2020-02-15 RX ADMIN — HEPARIN SODIUM 5000 UNIT(S): 5000 INJECTION INTRAVENOUS; SUBCUTANEOUS at 22:51

## 2020-02-15 RX ADMIN — CARVEDILOL PHOSPHATE 3.12 MILLIGRAM(S): 80 CAPSULE, EXTENDED RELEASE ORAL at 17:37

## 2020-02-15 RX ADMIN — Medication 1 UNIT(S): at 17:36

## 2020-02-15 RX ADMIN — Medication 81 MILLIGRAM(S): at 11:47

## 2020-02-15 RX ADMIN — BUDESONIDE AND FORMOTEROL FUMARATE DIHYDRATE 2 PUFF(S): 160; 4.5 AEROSOL RESPIRATORY (INHALATION) at 11:47

## 2020-02-15 RX ADMIN — HEPARIN SODIUM 5000 UNIT(S): 5000 INJECTION INTRAVENOUS; SUBCUTANEOUS at 07:06

## 2020-02-15 RX ADMIN — PANTOPRAZOLE SODIUM 40 MILLIGRAM(S): 20 TABLET, DELAYED RELEASE ORAL at 07:06

## 2020-02-15 RX ADMIN — CARVEDILOL PHOSPHATE 3.12 MILLIGRAM(S): 80 CAPSULE, EXTENDED RELEASE ORAL at 07:06

## 2020-02-15 NOTE — PROGRESS NOTE ADULT - SUBJECTIVE AND OBJECTIVE BOX
Patient is a 79y old  Female who presents with a chief complaint of Worsening of Bilateral legs/SOB, edema improved, NST Monday, continmue diuretics maintain negative      INTERVAL HPI/OVERNIGHT EVENTS:  T(C): 36.6 (02-15-20 @ 10:29), Max: 36.6 (02-15-20 @ 10:29)  HR: 86 (02-15-20 @ 10:29) (71 - 96)  BP: 102/71 (02-15-20 @ 10:29) (102/71 - 118/64)  RR: 18 (02-15-20 @ 10:29) (17 - 18)  SpO2: 100% (02-15-20 @ 10:29) (96% - 100%)  Wt(kg): --    LABS:                        12.5   6.50  )-----------( 123      ( 15 Feb 2020 06:58 )             38.4     02-15    138  |  100  |  53<H>  ----------------------------<  208<H>  4.3   |  33<H>  |  0.85    Ca    9.5      15 Feb 2020 06:58  Phos  2.7     02-15  Mg     2.0     02-15    TPro  5.6<L>  /  Alb  2.6<L>  /  TBili  0.5  /  DBili  x   /  AST  11  /  ALT  24  /  AlkPhos  96  02-15        CAPILLARY BLOOD GLUCOSE      POCT Blood Glucose.: 186 mg/dL (15 Feb 2020 11:44)  POCT Blood Glucose.: 235 mg/dL (15 Feb 2020 07:53)  POCT Blood Glucose.: 223 mg/dL (14 Feb 2020 21:06)  POCT Blood Glucose.: 263 mg/dL (14 Feb 2020 16:48)        RADIOLOGY & ADDITIONAL TESTS:  < from: TTE with Doppler (w/Cont) (02.13.20 @ 02:30) >  Technically difficult study with poor acoustic windows.    1. Mitral annular calcification. Mitral valve not well  visualized.  2. Aortic valve not well visualized, bioprosthesis by  history. Normal gradients for aortic prosthesis.  Trace  aortic regurgitation.  3. Normal aortic root.  4. Normal left ventricular internal dimensions and wall  thicknesses.  5. Endocardium not well visualized; grossly normal left  ventricular systolic function.  6. Grade I diastolic dysfunction (Impaired relaxation).  7. Right ventricle not well visualized.  8. Tricuspid valve not well seen.  9. No pericardial effusion.    < end of copied text >    Consultant(s) Notes Reviewed:  [x ] YES  [ ] NO    PHYSICAL EXAM:  GENERAL: well built, well nourished  HEAD:  Atraumatic, Normocephalic  EYES: EOMI, PERRLA, conjunctiva and sclera clear  ENT: No tonsillar erythema, exudates, or enlargement; Moist mucous membranes, Good dentition, No lesions  NECK: Supple, No JVD, Normal thyroid, no enlarged nodes  NERVOUS SYSTEM:  Alert & Oriented X3, Good concentration; Motor Strength 5/5 B/L upper and lower extremities; DTRs 2+ intact and symmetric, sensory intact  CHEST/LUNG: B/L good air entry; less  rales, rhonchi, or wheezing  HEART: S1S2 normal, no S3, Regular rate and rhythm; No murmurs, rubs, or gallops  ABDOMEN: Soft, Nontender, Nondistended; Bowel sounds present  EXTREMITIES:  2+ Peripheral Pulses, No clubbing, cyanosis, less edema  LYMPH: No lymphadenopathy noted  SKIN: No rashes or lesions    Care Discussed with Consultants/Other Providers [ x] YES  [ ] NO

## 2020-02-15 NOTE — PROGRESS NOTE ADULT - ATTENDING COMMENTS
Patient seen and examined.  Agree with above.   TTE with normal LV function  Check NST to rule out CAD    Dennise Schafer MD

## 2020-02-15 NOTE — PROGRESS NOTE ADULT - SUBJECTIVE AND OBJECTIVE BOX
Patient is a 79y Female with worsening  leg edema and  sob   FEELS  BETTER   TODAY    HER  SOB HAS  IMPROVED  AND  SO  HAS  HER LEG  SWELLING   VOIDING  WELL  HAS NO  COMPLAINTS  AT  THE  TIME OF  EXAM    No Known Drug Allergies  strawberry (Unknown)    Hospital Medications:   MEDICATIONS  (STANDING):  ammonium lactate 12% Lotion 1 Application(s) Topical two times a day  aspirin  chewable 81 milliGRAM(s) Oral daily  atorvastatin 40 milliGRAM(s) Oral at bedtime  budesonide  80 MICROgram(s)/formoterol 4.5 MICROgram(s) Inhaler 2 Puff(s) Inhalation two times a day  buMETAnide Injectable 1 milliGRAM(s) IV Push two times a day  carvedilol 3.125 milliGRAM(s) Oral every 12 hours  heparin  Injectable 5000 Unit(s) SubCutaneous every 8 hours  influenza   Vaccine 0.5 milliLiter(s) IntraMuscular once  insulin glargine Injectable (LANTUS) 10 Unit(s) SubCutaneous at bedtime  insulin lispro (HumaLOG) corrective regimen sliding scale   SubCutaneous Before meals and at bedtime  metolazone 5 milliGRAM(s) Oral daily  multivitamin/minerals 1 Tablet(s) Oral daily  pantoprazole    Tablet 40 milliGRAM(s) Oral before breakfast  predniSONE   Tablet 10 milliGRAM(s) Oral two times a day  sacubitril 49 mG/valsartan 51 mG 1 Tablet(s) Oral two times a day  tiotropium 18 MICROgram(s) Capsule 1 Capsule(s) Inhalation daily    REVIEW OF SYSTEMS:  HAS NO   FEVER  CHILLS   NO   SOB  OR  COUGH   NO CH  PAIN  / PALPITATIONS   APPETITE IS  GOOD, NO  N/V  OR  ABD  PAIN  VOIDING  WELL   LEG  SWELLING BETTER      VITALS:  T(F): 97.8 (02-15-20 @ 10:29), Max: 97.8 (02-15-20 @ 10:29)  HR: 86 (02-15-20 @ 10:29)  BP: 102/71 (02-15-20 @ 10:29)  RR: 18 (02-15-20 @ 10:29)  SpO2: 100% (02-15-20 @ 10:29)  Wt(kg): --      PHYSICAL EXAM:  Constitutional: NAD  HEENT: CONJ PINK  Neck: No JVD  Respiratory: CTAB, no wheezes, rales or rhonchi  Cardiovascular: S1, S2, RRR  Gastrointestinal: BS+, soft, NT/ND  Extremities:  No peripheral edema  Neurological: A/O x 3,  :  No osborne.         LABS:  02-15    138  |  100  |  53<H>  ----------------------------<  208<H>  4.3   |  33<H>  |  0.85    Ca    9.5      15 Feb 2020 06:58  Phos  2.7     02-15  Mg     2.0     02-15    TPro  5.6<L>  /  Alb  2.6<L>  /  TBili  0.5  /  DBili      /  AST  11  /  ALT  24  /  AlkPhos  96  02-15    Creatinine Trend: 0.85 <--, 1.06 <--, 1.81 <--, 2.09 <--                        12.5   6.50  )-----------( 123      ( 15 Feb 2020 06:58 )             38.4     Urine Studies:  Urinalysis Basic - ( 2020 18:40 )    Color: Yellow / Appearance: Clear / S.010 / pH:   Gluc:  / Ketone: Negative  / Bili: Negative / Urobili: Negative   Blood:  / Protein: Negative / Nitrite: Negative   Leuk Esterase: Trace / RBC: 2-5 /HPF / WBC 3-5 /HPF   Sq Epi:  / Non Sq Epi: Few /HPF / Bacteria: Moderate /HPF      Potassium, Random Urine: 17 mmol/L ( @ 18:40)  Sodium, Random Urine: 49 mmol/L ( @ 18:40)  Creatinine, Random Urine: 72 mg/dL ( @ 18:40)  Osmolality, Random Urine: 460 mos/kg ( @ 18:39)    RADIOLOGY & ADDITIONAL STUDIES:

## 2020-02-15 NOTE — PROGRESS NOTE ADULT - SUBJECTIVE AND OBJECTIVE BOX
pt seen and examined, no complaints, ROS - .         acetaminophen   Tablet .. 650 milliGRAM(s) Oral every 6 hours PRN  ALBUTerol    90 MICROgram(s) HFA Inhaler 2 Puff(s) Inhalation every 6 hours PRN  ammonium lactate 12% Lotion 1 Application(s) Topical two times a day  aspirin  chewable 81 milliGRAM(s) Oral daily  atorvastatin 40 milliGRAM(s) Oral at bedtime  budesonide  80 MICROgram(s)/formoterol 4.5 MICROgram(s) Inhaler 2 Puff(s) Inhalation two times a day  buMETAnide Injectable 1 milliGRAM(s) IV Push two times a day  carvedilol 3.125 milliGRAM(s) Oral every 12 hours  heparin  Injectable 5000 Unit(s) SubCutaneous every 8 hours  influenza   Vaccine 0.5 milliLiter(s) IntraMuscular once  insulin glargine Injectable (LANTUS) 10 Unit(s) SubCutaneous at bedtime  insulin lispro (HumaLOG) corrective regimen sliding scale   SubCutaneous Before meals and at bedtime  metolazone 5 milliGRAM(s) Oral daily  multivitamin/minerals 1 Tablet(s) Oral daily  pantoprazole    Tablet 40 milliGRAM(s) Oral before breakfast  predniSONE   Tablet 10 milliGRAM(s) Oral two times a day  sacubitril 49 mG/valsartan 51 mG 1 Tablet(s) Oral two times a day  tiotropium 18 MICROgram(s) Capsule 1 Capsule(s) Inhalation daily                            13.1   7.68  )-----------( 124      ( 14 Feb 2020 07:23 )             40.3       Hemoglobin: 13.1 g/dL (02-14 @ 07:23)  Hemoglobin: 12.8 g/dL (02-13 @ 06:14)  Hemoglobin: 14.5 g/dL (02-12 @ 15:05)      02-14    136  |  101  |  68<H>  ----------------------------<  170<H>  4.5   |  30  |  1.06    Ca    9.5      14 Feb 2020 07:23  Phos  3.4     02-14  Mg     2.2     02-14    TPro  5.5<L>  /  Alb  2.6<L>  /  TBili  0.6  /  DBili  x   /  AST  11  /  ALT  22  /  AlkPhos  89  02-14    Creatinine Trend: 1.06<--, 1.81<--, 2.09<--    COAGS:     CARDIAC MARKERS ( 12 Feb 2020 22:07 )  <0.015 ng/mL / x     / 27 U/L / x     / 2.4 ng/mL  CARDIAC MARKERS ( 12 Feb 2020 17:50 )  <0.015 ng/mL / x     / 28 U/L / x     / 2.6 ng/mL  CARDIAC MARKERS ( 12 Feb 2020 15:05 )  <0.015 ng/mL / x     / x     / x     / x            T(C): 36.4 (02-15-20 @ 05:46), Max: 36.4 (02-14-20 @ 10:52)  HR: 71 (02-15-20 @ 05:46) (71 - 96)  BP: 118/64 (02-15-20 @ 05:46) (94/52 - 118/64)  RR: 18 (02-15-20 @ 05:46) (17 - 18)  SpO2: 99% (02-15-20 @ 05:46) (96% - 100%)  Wt(kg): --    I&O's Summary    Gen: Appears well in NAD  HEENT:  (-)icterus (-)pallor  CV: N S1 S2 1/6 ANNETTE (+)2 Pulses B/l  Resp:  Clear to ausculatation B/L, normal effort  GI: (+) BS Soft, NT, ND  Lymph:  (+)Edema, (-)obvious lymphadenopathy  Skin: Warm to touch, Normal turgor  Psych: Appropriate mood and affect      TELEMETRY: 	  Sinus PVC    ECHO: < from: TTE with Doppler (w/Cont) (02.13.20 @ 02:30) >  CONCLUSIONS:  Technically difficult study with poor acoustic windows.    1. Mitral annular calcification. Mitral valve not well  visualized.  2. Aortic valve not well visualized, bioprosthesis by  history. Normal gradients for aortic prosthesis.  Trace  aortic regurgitation.  3. Normal aortic root.  4. Normal left ventricular internal dimensions and wall  thicknesses.  5. Endocardium not well visualized; grossly normal left  ventricular systolic function.  6. Grade I diastolic dysfunction (Impaired relaxation).  7. Right ventricle not well visualized.  8. Tricuspid valve not well seen.  9. No pericardial effusion.      *** No previous Echo exam.  ------------------------------------------------------------------------  Confirmed on  2/14/2020 - 16:39:22 by Gaurav Sutherland MD  ------------------------------------------------------------------------    < end of copied text >      ASSESSMENT/PLAN: 	79y  Female PMH COPD(3.5L), HFrEF(45%, Ischemic),CAD, Aortic dissection s/p repair and tissue AVR(on ASA 81mg), HTN, DM2 who presents with worsening leg swelling since 2 weeks.  - echo w/ normal LV fx , tissue valve with normal fx   - Keep net negative with IV Bumex   - STICT I+Os   - cont coreg ,   - Cont Entresto , per home dosing    D/W Dr Schafer

## 2020-02-15 NOTE — PROGRESS NOTE ADULT - SUBJECTIVE AND OBJECTIVE BOX
PGY1 Note discussed with Supervising Resident and Primary Attending.    Patient is a 79y old  Female who presents with a chief complaint of Worsening of Bilateral legs. (15 Feb 2020 13:11)      INTERVAL HPI/OVERNIGHT EVENTS :    No acute event overnight reported by overnight team and nurses.   Pt remained hemodynamically stable.  Pt seen and examined  at bed side. All concerns and questions answered.   Report no new complaint. Pt denies any fever, nausea, vomiting.  stopped metolazone, converted bumex to PO 1MG bid  sbp in 100s  Explained all test results and plan.  Appreciate all consults.   ***********************************************************************************************************    MEDICATIONS  (STANDING):  ammonium lactate 12% Lotion 1 Application(s) Topical two times a day  aspirin  chewable 81 milliGRAM(s) Oral daily  atorvastatin 40 milliGRAM(s) Oral at bedtime  budesonide  80 MICROgram(s)/formoterol 4.5 MICROgram(s) Inhaler 2 Puff(s) Inhalation two times a day  buMETAnide 1 milliGRAM(s) Oral two times a day  carvedilol 3.125 milliGRAM(s) Oral every 12 hours  heparin  Injectable 5000 Unit(s) SubCutaneous every 8 hours  influenza   Vaccine 0.5 milliLiter(s) IntraMuscular once  insulin glargine Injectable (LANTUS) 12 Unit(s) SubCutaneous at bedtime  insulin lispro (HumaLOG) corrective regimen sliding scale   SubCutaneous Before meals and at bedtime  insulin lispro Injectable (HumaLOG) 1 Unit(s) SubCutaneous three times a day before meals  multivitamin/minerals 1 Tablet(s) Oral daily  pantoprazole    Tablet 40 milliGRAM(s) Oral before breakfast  predniSONE   Tablet 10 milliGRAM(s) Oral two times a day  sacubitril 49 mG/valsartan 51 mG 1 Tablet(s) Oral two times a day  tiotropium 18 MICROgram(s) Capsule 1 Capsule(s) Inhalation daily    MEDICATIONS  (PRN):  acetaminophen   Tablet .. 650 milliGRAM(s) Oral every 6 hours PRN Mild Pain (1 - 3)  ALBUTerol    90 MICROgram(s) HFA Inhaler 2 Puff(s) Inhalation every 6 hours PRN Shortness of Breath      ***********************************************************************************************************    Allergies    No Known Drug Allergies  strawberry (Unknown)    Intolerances        ***********************************************************************************************************    REVIEW OF SYSTEMS :  * CONSTITUTIONAL      : No Fever, Weight loss, or Fatigue  * EYES                             : No eye pain , Visual disturbances or Discharge  * RESPIRATORY             : No Cough, Wheezing, Chills or Hemoptysis; No shortness of breath  * CARDIOVASCULAR     : No Chest pain, Palpitations, Dizziness, or Leg swelling  * GASTROINTESTINAL  : No Abdominal or Epigastric pain. No Nausea, Vomiting or Hematemesis; No Diarrhea or Constipation. No Melena or Hematochezia.  * GENITOURINARY        : No Dysuria , Frequency , Haematuria   * NEUROLOGICAL          : No Headaches, Memory loss, Loss of strength, Numbness, or Tremors  * MUSCULOSKELETAL   : No Joint pain  * PSYCHIATRY                 : No Depression or Anxiety   * HEME/LYMPH              : No Easy Bruising or Bleeding gums  * SKIN                               : No Itching, Burning, Rashes, or Lesions     ***********************************************************************************************************    Vital Signs Last 24 Hrs  T(C): 36.7 (15 Feb 2020 13:55), Max: 36.7 (15 Feb 2020 13:55)  T(F): 98.1 (15 Feb 2020 13:55), Max: 98.1 (15 Feb 2020 13:55)  HR: 84 (15 Feb 2020 13:55) (71 - 96)  BP: 101/63 (15 Feb 2020 13:55) (101/63 - 118/64)  BP(mean): --  RR: 18 (15 Feb 2020 13:55) (18 - 18)  SpO2: 98% (15 Feb 2020 13:55) (96% - 100%)    ***********************************************************************************************************    PHYSICAL EXAM :  * GENERAL                 : NAD, Well-groomed, Well-developed  * HEAD                       :  Atraumatic, Normocephalic  * EYES                         : EOMI, PERRLA, Conjunctiva and Sclera clear  * ENT                           : Moist Mucous Membranes  * NECK                         : Supple, No JVD, Normal Thyroid  * CHEST/LUNG           : Clear to Auscultation bilaterally; No Rales, Rhonchi, Wheezing or Rubs  * HEART                       : Regular Rate and Rhythm; No murmurs, Rubs or gallops  * ABDOMEN                : Soft, Non-tender, Non-distended; Bowel Sounds present  * NERVOUS SYSTEM  :  Alert & Oriented X3, Good Concentration; Motor Strength 5/5 B/L UL LL ; DTRs 2+ Intact and Symmetric  * EXTREMITIES            :  2+ Peripheral Pulses, No clubbing, cyanosis, or edema  * SKIN                           : No Rashes or Lesions    **********************************************************************************************************  LABS:                          12.5   6.50  )-----------( 123      ( 15 Feb 2020 06:58 )             38.4     02-15    138  |  100  |  53<H>  ----------------------------<  208<H>  4.3   |  33<H>  |  0.85    Ca    9.5      15 Feb 2020 06:58  Phos  2.7     02-15  Mg     2.0     02-15    TPro  5.6<L>  /  Alb  2.6<L>  /  TBili  0.5  /  DBili  x   /  AST  11  /  ALT  24  /  AlkPhos  96  02-15        CAPILLARY BLOOD GLUCOSE      POCT Blood Glucose.: 186 mg/dL (15 Feb 2020 11:44)  POCT Blood Glucose.: 235 mg/dL (15 Feb 2020 07:53)  POCT Blood Glucose.: 223 mg/dL (14 Feb 2020 21:06)  POCT Blood Glucose.: 263 mg/dL (14 Feb 2020 16:48)      **********************************************************************************************************    RADIOLOGY & ADDITIONAL TESTS:   No radiological imaging was required    Imaging Personally Reviewed   :  [ ] YES  [ ] NO    Consultant(s) Notes Reviewed :  [ ] YES  [ ] NO PGY1 Note discussed with Supervising Resident and Primary Attending.    Patient is a 79y old  Female who presents with a chief complaint of Worsening of Bilateral legs. (15 Feb 2020 13:11)      INTERVAL HPI/OVERNIGHT EVENTS :    No acute event overnight reported by overnight team and nurses.   Pt remained hemodynamically stable.  Pt seen and examined  at bed side. All concerns and questions answered.   Report no new complaint. Pt denies any fever, nausea, vomiting.  stopped metolazone, converted bumex to PO 1MG bid  sbp in 100s  Explained all test results and plan.  Appreciate all consults.   ***********************************************************************************************************    MEDICATIONS  (STANDING):  ammonium lactate 12% Lotion 1 Application(s) Topical two times a day  aspirin  chewable 81 milliGRAM(s) Oral daily  atorvastatin 40 milliGRAM(s) Oral at bedtime  budesonide  80 MICROgram(s)/formoterol 4.5 MICROgram(s) Inhaler 2 Puff(s) Inhalation two times a day  buMETAnide 1 milliGRAM(s) Oral two times a day  carvedilol 3.125 milliGRAM(s) Oral every 12 hours  heparin  Injectable 5000 Unit(s) SubCutaneous every 8 hours  influenza   Vaccine 0.5 milliLiter(s) IntraMuscular once  insulin glargine Injectable (LANTUS) 12 Unit(s) SubCutaneous at bedtime  insulin lispro (HumaLOG) corrective regimen sliding scale   SubCutaneous Before meals and at bedtime  insulin lispro Injectable (HumaLOG) 1 Unit(s) SubCutaneous three times a day before meals  multivitamin/minerals 1 Tablet(s) Oral daily  pantoprazole    Tablet 40 milliGRAM(s) Oral before breakfast  predniSONE   Tablet 10 milliGRAM(s) Oral two times a day  sacubitril 49 mG/valsartan 51 mG 1 Tablet(s) Oral two times a day  tiotropium 18 MICROgram(s) Capsule 1 Capsule(s) Inhalation daily    MEDICATIONS  (PRN):  acetaminophen   Tablet .. 650 milliGRAM(s) Oral every 6 hours PRN Mild Pain (1 - 3)  ALBUTerol    90 MICROgram(s) HFA Inhaler 2 Puff(s) Inhalation every 6 hours PRN Shortness of Breath      ***********************************************************************************************************    Allergies    No Known Drug Allergies  strawberry (Unknown)    Intolerances        ***********************************************************************************************************    REVIEW OF SYSTEMS :  *EYES                             : No eye pain , Visual disturbances  * RESPIRATORY             :rales present  * CARDIOVASCULAR     : No Chest pain, Palpitations, Dizziness, + Leg swelling  * GASTROINTESTINAL  : No Abdominal or Epigastric pain. No Nausea, Vomiting    * GENITOURINARY        : No Dysuria , Frequency ,   * NEUROLOGICAL          : No Headaches, Memory loss, Loss of strength,  * MUSCULOSKELETAL   : No Joint pain  * PSYCHIATRY                 : No Depression or Anxiety   * HEME/LYMPH              : No Easy Bruising   * SKIN                               : No Itching, Burning, +chronic skin changes bilaterally  ***********************************************************************************************************    Vital Signs Last 24 Hrs  T(C): 36.7 (15 Feb 2020 13:55), Max: 36.7 (15 Feb 2020 13:55)  T(F): 98.1 (15 Feb 2020 13:55), Max: 98.1 (15 Feb 2020 13:55)  HR: 84 (15 Feb 2020 13:55) (71 - 96)  BP: 101/63 (15 Feb 2020 13:55) (101/63 - 118/64)  BP(mean): --  RR: 18 (15 Feb 2020 13:55) (18 - 18)  SpO2: 98% (15 Feb 2020 13:55) (96% - 100%)    ***********************************************************************************************************    PHYSICAL EXAM :  *GENERAL                 : pleasant lady ,NAD  * HEAD                       :  Atraumatic, Normocephalic  * EYES                         :  Conjunctiva and Sclera clear  * ENT                           : Moist Mucous Membranes  * NECK                         : Supple, No JVD, Normal Thyroid  * CHEST/LUNG           : rales to Auscultation bilaterally;  * HEART                       : Regular Rate and Rhythm; No murmurs,   * ABDOMEN                : Soft, Non-tender, Non-distended; Bowel Sounds present  * NERVOUS SYSTEM  :  Alert & Oriented X3, Good Concentration;  * EXTREMITIES            :  2+ Peripheral Pulses, No clubbing, cyanosis,+ pitting  edema getting better  * SKIN                           :chronic skin changes bilaterrally on legs.    **********************************************************************************************************  LABS:                          12.5   6.50  )-----------( 123      ( 15 Feb 2020 06:58 )             38.4     02-15    138  |  100  |  53<H>  ----------------------------<  208<H>  4.3   |  33<H>  |  0.85    Ca    9.5      15 Feb 2020 06:58  Phos  2.7     02-15  Mg     2.0     02-15    TPro  5.6<L>  /  Alb  2.6<L>  /  TBili  0.5  /  DBili  x   /  AST  11  /  ALT  24  /  AlkPhos  96  02-15        CAPILLARY BLOOD GLUCOSE      POCT Blood Glucose.: 186 mg/dL (15 Feb 2020 11:44)  POCT Blood Glucose.: 235 mg/dL (15 Feb 2020 07:53)  POCT Blood Glucose.: 223 mg/dL (14 Feb 2020 21:06)  POCT Blood Glucose.: 263 mg/dL (14 Feb 2020 16:48)      **********************************************************************************************************    RADIOLOGY & ADDITIONAL TESTS:   No radiological imaging was required    Imaging Personally Reviewed   :  [x ] YES  [ ] NO    Consultant(s) Notes Reviewed :  [x ] YES  [ ] NO

## 2020-02-15 NOTE — PROGRESS NOTE ADULT - PROBLEM SELECTOR PLAN 1
Pt is c/o Bilateral  leg swelling and  compliant with meds.  On exam fluid overloaded with bilateral crackles on lung exam and bilateral pitting edema. proBNP on admission 720 with evidence of fluid overload   troponin  -ve  starting asa, statin   c/w Home medication Entresto  Started on Bumex 1mg q12 converted to po   METOLAZONE  last day today  strict I/Os, daily weights   echo SHOWS G1DD, normal left ventricle thickness and dimentions.  tele monitoring  cards eval - Dr. Nelson is consulted  pending ischemic eval

## 2020-02-16 LAB
ALBUMIN SERPL ELPH-MCNC: 2.5 G/DL — LOW (ref 3.5–5)
ALP SERPL-CCNC: 112 U/L — SIGNIFICANT CHANGE UP (ref 40–120)
ALT FLD-CCNC: 30 U/L DA — SIGNIFICANT CHANGE UP (ref 10–60)
ANION GAP SERPL CALC-SCNC: 5 MMOL/L — SIGNIFICANT CHANGE UP (ref 5–17)
ANISOCYTOSIS BLD QL: SLIGHT — SIGNIFICANT CHANGE UP
AST SERPL-CCNC: 15 U/L — SIGNIFICANT CHANGE UP (ref 10–40)
BASOPHILS # BLD AUTO: 0 K/UL — SIGNIFICANT CHANGE UP (ref 0–0.2)
BASOPHILS NFR BLD AUTO: 0 % — SIGNIFICANT CHANGE UP (ref 0–2)
BILIRUB SERPL-MCNC: 0.7 MG/DL — SIGNIFICANT CHANGE UP (ref 0.2–1.2)
BUN SERPL-MCNC: 51 MG/DL — HIGH (ref 7–18)
CALCIUM SERPL-MCNC: 9.3 MG/DL — SIGNIFICANT CHANGE UP (ref 8.4–10.5)
CHLORIDE SERPL-SCNC: 97 MMOL/L — SIGNIFICANT CHANGE UP (ref 96–108)
CO2 SERPL-SCNC: 35 MMOL/L — HIGH (ref 22–31)
CREAT SERPL-MCNC: 1.2 MG/DL — SIGNIFICANT CHANGE UP (ref 0.5–1.3)
EOSINOPHIL # BLD AUTO: 0 K/UL — SIGNIFICANT CHANGE UP (ref 0–0.5)
EOSINOPHIL NFR BLD AUTO: 0 % — SIGNIFICANT CHANGE UP (ref 0–6)
GLUCOSE BLDC GLUCOMTR-MCNC: 112 MG/DL — HIGH (ref 70–99)
GLUCOSE BLDC GLUCOMTR-MCNC: 138 MG/DL — HIGH (ref 70–99)
GLUCOSE BLDC GLUCOMTR-MCNC: 162 MG/DL — HIGH (ref 70–99)
GLUCOSE BLDC GLUCOMTR-MCNC: 207 MG/DL — HIGH (ref 70–99)
GLUCOSE SERPL-MCNC: 112 MG/DL — HIGH (ref 70–99)
HCT VFR BLD CALC: 40.1 % — SIGNIFICANT CHANGE UP (ref 34.5–45)
HGB BLD-MCNC: 12.9 G/DL — SIGNIFICANT CHANGE UP (ref 11.5–15.5)
LYMPHOCYTES # BLD AUTO: 1.01 K/UL — SIGNIFICANT CHANGE UP (ref 1–3.3)
LYMPHOCYTES # BLD AUTO: 11 % — LOW (ref 13–44)
MAGNESIUM SERPL-MCNC: 2 MG/DL — SIGNIFICANT CHANGE UP (ref 1.6–2.6)
MCHC RBC-ENTMCNC: 28.2 PG — SIGNIFICANT CHANGE UP (ref 27–34)
MCHC RBC-ENTMCNC: 32.2 GM/DL — SIGNIFICANT CHANGE UP (ref 32–36)
MCV RBC AUTO: 87.7 FL — SIGNIFICANT CHANGE UP (ref 80–100)
METAMYELOCYTES # FLD: 1 % — HIGH (ref 0–0)
MICROCYTES BLD QL: SLIGHT — SIGNIFICANT CHANGE UP
MONOCYTES # BLD AUTO: 0.55 K/UL — SIGNIFICANT CHANGE UP (ref 0–0.9)
MONOCYTES NFR BLD AUTO: 6 % — SIGNIFICANT CHANGE UP (ref 2–14)
MYELOCYTES NFR BLD: 1 % — HIGH (ref 0–0)
NEUTROPHILS # BLD AUTO: 7.45 K/UL — HIGH (ref 1.8–7.4)
NEUTROPHILS NFR BLD AUTO: 77 % — SIGNIFICANT CHANGE UP (ref 43–77)
NEUTS BAND # BLD: 4 % — SIGNIFICANT CHANGE UP (ref 0–8)
NRBC # BLD: 0 /100 — SIGNIFICANT CHANGE UP (ref 0–0)
PHOSPHATE SERPL-MCNC: 3.6 MG/DL — SIGNIFICANT CHANGE UP (ref 2.5–4.5)
PLAT MORPH BLD: NORMAL — SIGNIFICANT CHANGE UP
PLATELET # BLD AUTO: 150 K/UL — SIGNIFICANT CHANGE UP (ref 150–400)
POTASSIUM SERPL-MCNC: 4.1 MMOL/L — SIGNIFICANT CHANGE UP (ref 3.5–5.3)
POTASSIUM SERPL-SCNC: 4.1 MMOL/L — SIGNIFICANT CHANGE UP (ref 3.5–5.3)
PROT SERPL-MCNC: 5.7 G/DL — LOW (ref 6–8.3)
RBC # BLD: 4.57 M/UL — SIGNIFICANT CHANGE UP (ref 3.8–5.2)
RBC # FLD: 15.1 % — HIGH (ref 10.3–14.5)
RBC BLD AUTO: ABNORMAL
SODIUM SERPL-SCNC: 137 MMOL/L — SIGNIFICANT CHANGE UP (ref 135–145)
WBC # BLD: 9.2 K/UL — SIGNIFICANT CHANGE UP (ref 3.8–10.5)
WBC # FLD AUTO: 9.2 K/UL — SIGNIFICANT CHANGE UP (ref 3.8–10.5)

## 2020-02-16 RX ORDER — SACUBITRIL AND VALSARTAN 24; 26 MG/1; MG/1
1 TABLET, FILM COATED ORAL
Refills: 0 | Status: DISCONTINUED | OUTPATIENT
Start: 2020-02-16 | End: 2020-02-18

## 2020-02-16 RX ORDER — SODIUM CHLORIDE 9 MG/ML
1000 INJECTION INTRAMUSCULAR; INTRAVENOUS; SUBCUTANEOUS ONCE
Refills: 0 | Status: DISCONTINUED | OUTPATIENT
Start: 2020-02-16 | End: 2020-02-16

## 2020-02-16 RX ADMIN — Medication 10 MILLIGRAM(S): at 05:47

## 2020-02-16 RX ADMIN — Medication 1 UNIT(S): at 12:09

## 2020-02-16 RX ADMIN — Medication 2: at 12:08

## 2020-02-16 RX ADMIN — Medication 1 UNIT(S): at 17:28

## 2020-02-16 RX ADMIN — HEPARIN SODIUM 5000 UNIT(S): 5000 INJECTION INTRAVENOUS; SUBCUTANEOUS at 22:19

## 2020-02-16 RX ADMIN — Medication 10 MILLIGRAM(S): at 17:28

## 2020-02-16 RX ADMIN — Medication 1 APPLICATION(S): at 05:46

## 2020-02-16 RX ADMIN — TIOTROPIUM BROMIDE 1 CAPSULE(S): 18 CAPSULE ORAL; RESPIRATORY (INHALATION) at 12:09

## 2020-02-16 RX ADMIN — BUDESONIDE AND FORMOTEROL FUMARATE DIHYDRATE 2 PUFF(S): 160; 4.5 AEROSOL RESPIRATORY (INHALATION) at 22:20

## 2020-02-16 RX ADMIN — Medication 1 APPLICATION(S): at 17:27

## 2020-02-16 RX ADMIN — Medication 1: at 17:28

## 2020-02-16 RX ADMIN — Medication 1 TABLET(S): at 12:09

## 2020-02-16 RX ADMIN — PANTOPRAZOLE SODIUM 40 MILLIGRAM(S): 20 TABLET, DELAYED RELEASE ORAL at 05:46

## 2020-02-16 RX ADMIN — INSULIN GLARGINE 12 UNIT(S): 100 INJECTION, SOLUTION SUBCUTANEOUS at 22:19

## 2020-02-16 RX ADMIN — HEPARIN SODIUM 5000 UNIT(S): 5000 INJECTION INTRAVENOUS; SUBCUTANEOUS at 13:21

## 2020-02-16 RX ADMIN — BUDESONIDE AND FORMOTEROL FUMARATE DIHYDRATE 2 PUFF(S): 160; 4.5 AEROSOL RESPIRATORY (INHALATION) at 12:09

## 2020-02-16 RX ADMIN — ATORVASTATIN CALCIUM 40 MILLIGRAM(S): 80 TABLET, FILM COATED ORAL at 22:19

## 2020-02-16 RX ADMIN — Medication 81 MILLIGRAM(S): at 12:09

## 2020-02-16 RX ADMIN — HEPARIN SODIUM 5000 UNIT(S): 5000 INJECTION INTRAVENOUS; SUBCUTANEOUS at 05:47

## 2020-02-16 NOTE — PROGRESS NOTE ADULT - SUBJECTIVE AND OBJECTIVE BOX
pt seen and examined, no complaints, ROS - .            acetaminophen   Tablet .. 650 milliGRAM(s) Oral every 6 hours PRN  ALBUTerol    90 MICROgram(s) HFA Inhaler 2 Puff(s) Inhalation every 6 hours PRN  ammonium lactate 12% Lotion 1 Application(s) Topical two times a day  aspirin  chewable 81 milliGRAM(s) Oral daily  atorvastatin 40 milliGRAM(s) Oral at bedtime  budesonide  80 MICROgram(s)/formoterol 4.5 MICROgram(s) Inhaler 2 Puff(s) Inhalation two times a day  buMETAnide 1 milliGRAM(s) Oral two times a day  carvedilol 3.125 milliGRAM(s) Oral every 12 hours  heparin  Injectable 5000 Unit(s) SubCutaneous every 8 hours  influenza   Vaccine 0.5 milliLiter(s) IntraMuscular once  insulin glargine Injectable (LANTUS) 12 Unit(s) SubCutaneous at bedtime  insulin lispro (HumaLOG) corrective regimen sliding scale   SubCutaneous Before meals and at bedtime  insulin lispro Injectable (HumaLOG) 1 Unit(s) SubCutaneous three times a day before meals  multivitamin/minerals 1 Tablet(s) Oral daily  pantoprazole    Tablet 40 milliGRAM(s) Oral before breakfast  predniSONE   Tablet 10 milliGRAM(s) Oral two times a day  sacubitril 49 mG/valsartan 51 mG 1 Tablet(s) Oral two times a day  tiotropium 18 MICROgram(s) Capsule 1 Capsule(s) Inhalation daily                            12.5   6.50  )-----------( 123      ( 15 Feb 2020 06:58 )             38.4       Hemoglobin: 12.5 g/dL (02-15 @ 06:58)  Hemoglobin: 13.1 g/dL (02-14 @ 07:23)  Hemoglobin: 12.8 g/dL (02-13 @ 06:14)  Hemoglobin: 14.5 g/dL (02-12 @ 15:05)      02-15    138  |  100  |  53<H>  ----------------------------<  208<H>  4.3   |  33<H>  |  0.85    Ca    9.5      15 Feb 2020 06:58  Phos  2.7     02-15  Mg     2.0     02-15    TPro  5.6<L>  /  Alb  2.6<L>  /  TBili  0.5  /  DBili  x   /  AST  11  /  ALT  24  /  AlkPhos  96  02-15    Creatinine Trend: 0.85<--, 1.06<--, 1.81<--, 2.09<--    COAGS:           T(C): 36.2 (02-16-20 @ 01:03), Max: 36.7 (02-15-20 @ 13:55)  HR: 96 (02-16-20 @ 01:03) (71 - 100)  BP: 91/51 (02-16-20 @ 01:03) (91/51 - 118/95)  RR: 17 (02-16-20 @ 01:03) (17 - 18)  SpO2: 97% (02-16-20 @ 01:03) (97% - 100%)  Wt(kg): --    I&O's Summary    Gen: Appears well in NAD  HEENT:  (-)icterus (-)pallor  CV: N S1 S2 1/6 ANNETTE (+)2 Pulses B/l  Resp:  Clear to ausculatation B/L, normal effort  GI: (+) BS Soft, NT, ND  Lymph:  (+)Edema, (-)obvious lymphadenopathy  Skin: Warm to touch, Normal turgor  Psych: Appropriate mood and affect      TELEMETRY: 	  Sinus PVC    ECHO: < from: TTE with Doppler (w/Cont) (02.13.20 @ 02:30) >  CONCLUSIONS:  Technically difficult study with poor acoustic windows.    1. Mitral annular calcification. Mitral valve not well  visualized.  2. Aortic valve not well visualized, bioprosthesis by  history. Normal gradients for aortic prosthesis.  Trace  aortic regurgitation.  3. Normal aortic root.  4. Normal left ventricular internal dimensions and wall  thicknesses.  5. Endocardium not well visualized; grossly normal left  ventricular systolic function.  6. Grade I diastolic dysfunction (Impaired relaxation).  7. Right ventricle not well visualized.  8. Tricuspid valve not well seen.  9. No pericardial effusion.      *** No previous Echo exam.  ------------------------------------------------------------------------  Confirmed on  2/14/2020 - 16:39:22 by Gaurav Sutherland MD  ------------------------------------------------------------------------    < end of copied text >      ASSESSMENT/PLAN: 	79y  Female PMH COPD(3.5L), HFrEF(45%, Ischemic),CAD, Aortic dissection s/p repair and tissue AVR(on ASA 81mg), HTN, DM2 who presents with worsening leg swelling since 2 weeks.    - ECHO noted above   - echo w/ normal LV fx , tissue valve with normal fx   - Keep net negative with IV Bumex   - STICT I+Os   - cont coreg ,   - Cont Entresto , per home dosing   ** NST pending to R/O CAD   D/W Dr Schafer

## 2020-02-16 NOTE — PROGRESS NOTE ADULT - SUBJECTIVE AND OBJECTIVE BOX
Patient is a 79y Female with  INCREASING  SOB AND  LEG  SWELLING  FEELS OK  AT THE TIME OF  EXAM   HAS  NO SOB  VOIDING  WELL    No Known Drug Allergies  strawberry (Unknown)    Hospital Medications:   MEDICATIONS  (STANDING):  ammonium lactate 12% Lotion 1 Application(s) Topical two times a day  aspirin  chewable 81 milliGRAM(s) Oral daily  atorvastatin 40 milliGRAM(s) Oral at bedtime  budesonide  80 MICROgram(s)/formoterol 4.5 MICROgram(s) Inhaler 2 Puff(s) Inhalation two times a day  buMETAnide 1 milliGRAM(s) Oral two times a day  heparin  Injectable 5000 Unit(s) SubCutaneous every 8 hours  influenza   Vaccine 0.5 milliLiter(s) IntraMuscular once  insulin glargine Injectable (LANTUS) 12 Unit(s) SubCutaneous at bedtime  insulin lispro (HumaLOG) corrective regimen sliding scale   SubCutaneous Before meals and at bedtime  insulin lispro Injectable (HumaLOG) 1 Unit(s) SubCutaneous three times a day before meals  multivitamin/minerals 1 Tablet(s) Oral daily  pantoprazole    Tablet 40 milliGRAM(s) Oral before breakfast  predniSONE   Tablet 10 milliGRAM(s) Oral two times a day  sacubitril 24 mG/valsartan 26 mG 1 Tablet(s) Oral two times a day  tiotropium 18 MICROgram(s) Capsule 1 Capsule(s) Inhalation daily    REVIEW OF SYSTEMS:  HAS NO   FEVER  CHILLS   NO   SOB  OR  COUGH   NO CH  PAIN  / PALPITATIONS   APPETITE IS OK, NO  N/V  OR  ABD  PAIN  VOIDING  WELL   NO LEG  SWELLING    VITALS:  T(F): 97.5 (20 @ 13:30), Max: 97.8 (02-15-20 @ 22:31)  HR: 82 (20 @ 13:30)  BP: 100/65 (20 @ 13:30)  RR: 18 (20 @ 13:30)  SpO2: 98% (20 @ 13:30)  Wt(kg): --      PHYSICAL EXAM:  Constitutional: NAD  HEENT: CONJ PINK  Neck: No JVD  Respiratory: CTAB, no wheezes, rales or rhonchi  Cardiovascular: S1, S2, RRR  Gastrointestinal: BS+, soft, NT/ND  Extremities: No peripheral edema  Neurological: A/O x 3,  : No osborne.         LABS:      137  |  97  |  51<H>  ----------------------------<  112<H>  4.1   |  35<H>  |  1.20    Ca    9.3      2020 07:26  Phos  3.6       Mg     2.0         TPro  5.7<L>  /  Alb  2.5<L>  /  TBili  0.7  /  DBili      /  AST  15  /  ALT  30  /  AlkPhos  112      Creatinine Trend: 1.20 <--, 0.85 <--, 1.06 <--, 1.81 <--, 2.09 <--                        12.9   9.20  )-----------( 150      ( 2020 07:26 )             40.1     Urine Studies:  Urinalysis Basic - ( 2020 18:40 )    Color: Yellow / Appearance: Clear / S.010 / pH:   Gluc:  / Ketone: Negative  / Bili: Negative / Urobili: Negative   Blood:  / Protein: Negative / Nitrite: Negative   Leuk Esterase: Trace / RBC: 2-5 /HPF / WBC 3-5 /HPF   Sq Epi:  / Non Sq Epi: Few /HPF / Bacteria: Moderate /HPF      Potassium, Random Urine: 17 mmol/L ( @ 18:40)  Sodium, Random Urine: 49 mmol/L ( @ 18:40)  Creatinine, Random Urine: 72 mg/dL ( @ 18:40)  Osmolality, Random Urine: 460 mos/kg ( @ 18:39)    RADIOLOGY & ADDITIONAL STUDIES:

## 2020-02-16 NOTE — PROGRESS NOTE ADULT - SUBJECTIVE AND OBJECTIVE BOX
Patient is a 79y old  Female who presents with a chief complaint of Worsening of Bilateral legs/SOB, C/O tired today      INTERVAL HPI/OVERNIGHT EVENTS:  T(C): 36.2 (02-16-20 @ 05:26), Max: 36.7 (02-15-20 @ 13:55)  HR: 88 (02-16-20 @ 07:16) (84 - 100)  BP: 94/58 (02-16-20 @ 07:16) (87/58 - 118/95)  RR: 17 (02-16-20 @ 05:26) (17 - 18)  SpO2: 100% (02-16-20 @ 07:16) (97% - 100%)  Wt(kg): --    LABS:                        12.9   9.20  )-----------( 150      ( 16 Feb 2020 07:26 )             40.1     02-16    137  |  97  |  51<H>  ----------------------------<  112<H>  4.1   |  35<H>  |  1.20    Ca    9.3      16 Feb 2020 07:26  Phos  3.6     02-16  Mg     2.0     02-16    TPro  5.7<L>  /  Alb  2.5<L>  /  TBili  0.7  /  DBili  x   /  AST  15  /  ALT  30  /  AlkPhos  112  02-16        CAPILLARY BLOOD GLUCOSE      POCT Blood Glucose.: 112 mg/dL (16 Feb 2020 07:50)  POCT Blood Glucose.: 202 mg/dL (15 Feb 2020 22:13)  POCT Blood Glucose.: 160 mg/dL (15 Feb 2020 16:49)  POCT Blood Glucose.: 186 mg/dL (15 Feb 2020 11:44)        RADIOLOGY & ADDITIONAL TESTS:    Consultant(s) Notes Reviewed:  [x ] YES  [ ] NO    PHYSICAL EXAM:  GENERAL: well built, well nourished  HEAD:  Atraumatic, Normocephalic  EYES: EOMI, PERRLA, conjunctiva and sclera clear  ENT: No tonsillar erythema, exudates, or enlargement; Moist mucous membranes, Good dentition, No lesions  NECK: Supple, No JVD, Normal thyroid, no enlarged nodes  NERVOUS SYSTEM:  Alert & Oriented X3, Good concentration; Motor Strength 5/5 B/L upper and lower extremities; DTRs 2+ intact and symmetric, sensory intact  CHEST/LUNG: B/L good air entry; No rales, rhonchi, or wheezing  HEART: S1S2 normal, no S3, Regular rate and rhythm; No murmurs, rubs, or gallops  ABDOMEN: Soft, Nontender, Nondistended; Bowel sounds present  EXTREMITIES:  2+ Peripheral Pulses, No clubbing, cyanosis,  edema improved  LYMPH: No lymphadenopathy noted  SKIN: No rashes or lesions    Care Discussed with Consultants/Other Providers [ x] YES  [ ] NO

## 2020-02-17 ENCOUNTER — TRANSCRIPTION ENCOUNTER (OUTPATIENT)
Age: 80
End: 2020-02-17

## 2020-02-17 LAB
ALBUMIN SERPL ELPH-MCNC: 2.5 G/DL — LOW (ref 3.5–5)
ALP SERPL-CCNC: 113 U/L — SIGNIFICANT CHANGE UP (ref 40–120)
ALT FLD-CCNC: 31 U/L DA — SIGNIFICANT CHANGE UP (ref 10–60)
ANION GAP SERPL CALC-SCNC: 6 MMOL/L — SIGNIFICANT CHANGE UP (ref 5–17)
AST SERPL-CCNC: 18 U/L — SIGNIFICANT CHANGE UP (ref 10–40)
BASOPHILS # BLD AUTO: 0.03 K/UL — SIGNIFICANT CHANGE UP (ref 0–0.2)
BASOPHILS NFR BLD AUTO: 0.4 % — SIGNIFICANT CHANGE UP (ref 0–2)
BILIRUB SERPL-MCNC: 0.7 MG/DL — SIGNIFICANT CHANGE UP (ref 0.2–1.2)
BUN SERPL-MCNC: 62 MG/DL — HIGH (ref 7–18)
CALCIUM SERPL-MCNC: 9.2 MG/DL — SIGNIFICANT CHANGE UP (ref 8.4–10.5)
CHLORIDE SERPL-SCNC: 96 MMOL/L — SIGNIFICANT CHANGE UP (ref 96–108)
CO2 SERPL-SCNC: 35 MMOL/L — HIGH (ref 22–31)
CREAT SERPL-MCNC: 1.11 MG/DL — SIGNIFICANT CHANGE UP (ref 0.5–1.3)
EOSINOPHIL # BLD AUTO: 0.03 K/UL — SIGNIFICANT CHANGE UP (ref 0–0.5)
EOSINOPHIL NFR BLD AUTO: 0.4 % — SIGNIFICANT CHANGE UP (ref 0–6)
GLUCOSE BLDC GLUCOMTR-MCNC: 125 MG/DL — HIGH (ref 70–99)
GLUCOSE BLDC GLUCOMTR-MCNC: 126 MG/DL — HIGH (ref 70–99)
GLUCOSE BLDC GLUCOMTR-MCNC: 153 MG/DL — HIGH (ref 70–99)
GLUCOSE BLDC GLUCOMTR-MCNC: 174 MG/DL — HIGH (ref 70–99)
GLUCOSE SERPL-MCNC: 88 MG/DL — SIGNIFICANT CHANGE UP (ref 70–99)
HCT VFR BLD CALC: 39.2 % — SIGNIFICANT CHANGE UP (ref 34.5–45)
HGB BLD-MCNC: 12.7 G/DL — SIGNIFICANT CHANGE UP (ref 11.5–15.5)
IMM GRANULOCYTES NFR BLD AUTO: 5 % — HIGH (ref 0–1.5)
LYMPHOCYTES # BLD AUTO: 0.7 K/UL — LOW (ref 1–3.3)
LYMPHOCYTES # BLD AUTO: 8.9 % — LOW (ref 13–44)
MAGNESIUM SERPL-MCNC: 2.2 MG/DL — SIGNIFICANT CHANGE UP (ref 1.6–2.6)
MCHC RBC-ENTMCNC: 28.5 PG — SIGNIFICANT CHANGE UP (ref 27–34)
MCHC RBC-ENTMCNC: 32.4 GM/DL — SIGNIFICANT CHANGE UP (ref 32–36)
MCV RBC AUTO: 87.9 FL — SIGNIFICANT CHANGE UP (ref 80–100)
MONOCYTES # BLD AUTO: 0.33 K/UL — SIGNIFICANT CHANGE UP (ref 0–0.9)
MONOCYTES NFR BLD AUTO: 4.2 % — SIGNIFICANT CHANGE UP (ref 2–14)
NEUTROPHILS # BLD AUTO: 6.39 K/UL — SIGNIFICANT CHANGE UP (ref 1.8–7.4)
NEUTROPHILS NFR BLD AUTO: 81.1 % — HIGH (ref 43–77)
NRBC # BLD: 0 /100 WBCS — SIGNIFICANT CHANGE UP (ref 0–0)
PHOSPHATE SERPL-MCNC: 3.4 MG/DL — SIGNIFICANT CHANGE UP (ref 2.5–4.5)
PLATELET # BLD AUTO: 155 K/UL — SIGNIFICANT CHANGE UP (ref 150–400)
POTASSIUM SERPL-MCNC: 4 MMOL/L — SIGNIFICANT CHANGE UP (ref 3.5–5.3)
POTASSIUM SERPL-SCNC: 4 MMOL/L — SIGNIFICANT CHANGE UP (ref 3.5–5.3)
PROT SERPL-MCNC: 5.7 G/DL — LOW (ref 6–8.3)
RBC # BLD: 4.46 M/UL — SIGNIFICANT CHANGE UP (ref 3.8–5.2)
RBC # FLD: 15.4 % — HIGH (ref 10.3–14.5)
SODIUM SERPL-SCNC: 137 MMOL/L — SIGNIFICANT CHANGE UP (ref 135–145)
WBC # BLD: 7.87 K/UL — SIGNIFICANT CHANGE UP (ref 3.8–10.5)
WBC # FLD AUTO: 7.87 K/UL — SIGNIFICANT CHANGE UP (ref 3.8–10.5)

## 2020-02-17 RX ADMIN — Medication 1 UNIT(S): at 11:58

## 2020-02-17 RX ADMIN — BUMETANIDE 1 MILLIGRAM(S): 0.25 INJECTION INTRAMUSCULAR; INTRAVENOUS at 05:27

## 2020-02-17 RX ADMIN — Medication 1: at 11:58

## 2020-02-17 RX ADMIN — BUMETANIDE 1 MILLIGRAM(S): 0.25 INJECTION INTRAMUSCULAR; INTRAVENOUS at 17:30

## 2020-02-17 RX ADMIN — Medication 10 MILLIGRAM(S): at 17:30

## 2020-02-17 RX ADMIN — PANTOPRAZOLE SODIUM 40 MILLIGRAM(S): 20 TABLET, DELAYED RELEASE ORAL at 05:27

## 2020-02-17 RX ADMIN — Medication 1 APPLICATION(S): at 05:28

## 2020-02-17 RX ADMIN — ATORVASTATIN CALCIUM 40 MILLIGRAM(S): 80 TABLET, FILM COATED ORAL at 22:09

## 2020-02-17 RX ADMIN — Medication 81 MILLIGRAM(S): at 11:44

## 2020-02-17 RX ADMIN — SACUBITRIL AND VALSARTAN 1 TABLET(S): 24; 26 TABLET, FILM COATED ORAL at 05:27

## 2020-02-17 RX ADMIN — BUDESONIDE AND FORMOTEROL FUMARATE DIHYDRATE 2 PUFF(S): 160; 4.5 AEROSOL RESPIRATORY (INHALATION) at 22:08

## 2020-02-17 RX ADMIN — INSULIN GLARGINE 12 UNIT(S): 100 INJECTION, SOLUTION SUBCUTANEOUS at 22:09

## 2020-02-17 RX ADMIN — Medication 1 TABLET(S): at 11:45

## 2020-02-17 RX ADMIN — Medication 10 MILLIGRAM(S): at 05:27

## 2020-02-17 RX ADMIN — Medication 1 APPLICATION(S): at 17:30

## 2020-02-17 RX ADMIN — HEPARIN SODIUM 5000 UNIT(S): 5000 INJECTION INTRAVENOUS; SUBCUTANEOUS at 05:28

## 2020-02-17 RX ADMIN — TIOTROPIUM BROMIDE 1 CAPSULE(S): 18 CAPSULE ORAL; RESPIRATORY (INHALATION) at 11:44

## 2020-02-17 RX ADMIN — SACUBITRIL AND VALSARTAN 1 TABLET(S): 24; 26 TABLET, FILM COATED ORAL at 17:30

## 2020-02-17 RX ADMIN — HEPARIN SODIUM 5000 UNIT(S): 5000 INJECTION INTRAVENOUS; SUBCUTANEOUS at 22:09

## 2020-02-17 RX ADMIN — Medication 1 UNIT(S): at 16:57

## 2020-02-17 RX ADMIN — Medication 1: at 22:08

## 2020-02-17 RX ADMIN — BUDESONIDE AND FORMOTEROL FUMARATE DIHYDRATE 2 PUFF(S): 160; 4.5 AEROSOL RESPIRATORY (INHALATION) at 11:44

## 2020-02-17 NOTE — DISCHARGE NOTE PROVIDER - NSDCMRMEDTOKEN_GEN_ALL_CORE_FT
Advair Diskus 500 mcg-50 mcg inhalation powder:   aspirin 81 mg oral tablet, chewable: 1 tab(s) orally once a day  clotrimazole 10 mg oral lozenge: 1 lozenge orally once a day  Entresto 49 mg-51 mg oral tablet: 1 tab(s) orally 2 times a day  lactulose 10 g/15 mL oral syrup: 45 milliliter(s) orally once a day  metFORMIN 500 mg oral tablet:   nystatin 100,000 units/mL oral suspension: 5 milliliter(s) orally 4 times a day  omeprazole 20 mg oral delayed release capsule:   predniSONE 10 mg oral tablet: 1 tab(s) orally once a day x 4 days  0.5 tab orally for 4 days  then STOP  predniSONE 10 mg oral tablet: 1 tab(s) orally 2 times a day  PreserVision oral tablet: 1 tab(s) orally once a day  ProAir HFA:   Spiriva 18 mcg inhalation capsule: 1 cap(s) inhaled once a day  Symbicort 80 mcg-4.5 mcg/inh inhalation aerosol: 2 puff(s) inhaled 2 times a day  Vitamin D3: albuterol 90 mcg/inh inhalation aerosol: 2 puff(s) inhaled every 6 hours, As needed, Shortness of Breath  aspirin 81 mg oral tablet, chewable: 1 tab(s) orally once a day  atorvastatin 40 mg oral tablet: 1 tab(s) orally once a day (at bedtime)  budesonide-formoterol 80 mcg-4.5 mcg/inh inhalation aerosol:  inhaled   bumetanide 1 mg oral tablet: 1 tab(s) orally 2 times a day  metFORMIN 1000 mg oral tablet: 1 tab(s) orally 2 times a day  Multiple Vitamins with Minerals oral tablet: 1 tab(s) orally once a day  pantoprazole 40 mg oral delayed release tablet: 1 tab(s) orally once a day (before a meal)  predniSONE 10 mg oral tablet: 1 tab(s) orally 2 times a day  tiotropium 18 mcg inhalation capsule: 1 cap(s) inhaled once a day  Vitamin D3:

## 2020-02-17 NOTE — PROGRESS NOTE ADULT - SUBJECTIVE AND OBJECTIVE BOX
PGY1 Note discussed with Supervising Resident and Primary Attending.    Patient is a 79y old  Female who presents with a chief complaint of Worsening of Bilateral legs/SOB (17 Feb 2020 10:42)      INTERVAL HPI/OVERNIGHT EVENTS :    No acute event overnight reported by overnight team and nurses.   Pt remained hemodynamically stable.  Pt seen and examined  at bed side. All concerns and questions answered.   Report no new complaint. Pt denies any fever, nausea, vomiting.  Pt awaiitng LISETTE   pT BICARB levels are high due to contraction alkalosis  pt got stress test today       ***********************************************************************************************************    MEDICATIONS  (STANDING):  ammonium lactate 12% Lotion 1 Application(s) Topical two times a day  aspirin  chewable 81 milliGRAM(s) Oral daily  atorvastatin 40 milliGRAM(s) Oral at bedtime  budesonide  80 MICROgram(s)/formoterol 4.5 MICROgram(s) Inhaler 2 Puff(s) Inhalation two times a day  buMETAnide 1 milliGRAM(s) Oral two times a day  heparin  Injectable 5000 Unit(s) SubCutaneous every 8 hours  influenza   Vaccine 0.5 milliLiter(s) IntraMuscular once  insulin glargine Injectable (LANTUS) 12 Unit(s) SubCutaneous at bedtime  insulin lispro (HumaLOG) corrective regimen sliding scale   SubCutaneous Before meals and at bedtime  insulin lispro Injectable (HumaLOG) 1 Unit(s) SubCutaneous three times a day before meals  multivitamin/minerals 1 Tablet(s) Oral daily  pantoprazole    Tablet 40 milliGRAM(s) Oral before breakfast  predniSONE   Tablet 10 milliGRAM(s) Oral two times a day  sacubitril 24 mG/valsartan 26 mG 1 Tablet(s) Oral two times a day  tiotropium 18 MICROgram(s) Capsule 1 Capsule(s) Inhalation daily    MEDICATIONS  (PRN):  acetaminophen   Tablet .. 650 milliGRAM(s) Oral every 6 hours PRN Mild Pain (1 - 3)  ALBUTerol    90 MICROgram(s) HFA Inhaler 2 Puff(s) Inhalation every 6 hours PRN Shortness of Breath      ***********************************************************************************************************    Allergies    No Known Drug Allergies  strawberry (Unknown)    Intolerances        ***********************************************************************************************************    REVIEW OF SYSTEMS :  *EYES                             : No eye pain , Visual disturbances  * RESPIRATORY             :rales present  * CARDIOVASCULAR     : No Chest pain, Palpitations, Dizziness, + Leg swelling  * GASTROINTESTINAL  : No Abdominal or Epigastric pain. No Nausea, Vomiting    * GENITOURINARY        : No Dysuria , Frequency ,   * NEUROLOGICAL          : No Headaches, Memory loss, Loss of strength,  * MUSCULOSKELETAL   : No Joint pain  * PSYCHIATRY                 : No Depression or Anxiety   * HEME/LYMPH              : No Easy Bruising   * SKIN                               : No Itching, Burning, +chronic skin changes bilaterally    ***********************************************************************************************************    Vital Signs Last 24 Hrs  T(C): 36.2 (17 Feb 2020 05:09), Max: 37.2 (17 Feb 2020 02:03)  T(F): 97.2 (17 Feb 2020 05:09), Max: 98.9 (17 Feb 2020 02:03)  HR: 84 (17 Feb 2020 05:09) (82 - 97)  BP: 100/63 (17 Feb 2020 05:09) (85/59 - 117/73)  BP(mean): 60 (16 Feb 2020 19:38) (60 - 60)  RR: 18 (17 Feb 2020 05:09) (18 - 18)  SpO2: 98% (17 Feb 2020 05:09) (97% - 100%)    ***********************************************************************************************************    PHYSICAL EXAM :  *GENERAL                 : pleasant lady ,NAD  * HEAD                       :  Atraumatic, Normocephalic  * EYES                         :  Conjunctiva and Sclera clear  * ENT                           : Moist Mucous Membranes  * NECK                         : Supple, No JVD, Normal Thyroid  * CHEST/LUNG           : rales to Auscultation bilaterally;  * HEART                       : Regular Rate and Rhythm; No murmurs,   * ABDOMEN                : Soft, Non-tender, Non-distended; Bowel Sounds present  * NERVOUS SYSTEM  :  Alert & Oriented X3, Good Concentration;  * EXTREMITIES            :  2+ Peripheral Pulses, No clubbing, cyanosis,+ pitting  edema getting better  * SKIN                           :chronic skin changes bilaterrally on legs.    **********************************************************************************************************  LABS:                          12.7   7.87  )-----------( 155      ( 17 Feb 2020 06:55 )             39.2     02-17    137  |  96  |  62<H>  ----------------------------<  88  4.0   |  35<H>  |  1.11    Ca    9.2      17 Feb 2020 06:55  Phos  3.4     02-17  Mg     2.2     02-17    TPro  5.7<L>  /  Alb  2.5<L>  /  TBili  0.7  /  DBili  x   /  AST  18  /  ALT  31  /  AlkPhos  113  02-17        CAPILLARY BLOOD GLUCOSE      POCT Blood Glucose.: 153 mg/dL (17 Feb 2020 11:49)  POCT Blood Glucose.: 126 mg/dL (17 Feb 2020 08:16)  POCT Blood Glucose.: 138 mg/dL (16 Feb 2020 21:37)  POCT Blood Glucose.: 162 mg/dL (16 Feb 2020 16:43)      **********************************************************************************************************    RADIOLOGY & ADDITIONAL TESTS:   No radiological imaging was required    Imaging Personally Reviewed   :  [X ] YES  [ ] NO    Consultant(s) Notes Reviewed :  [ X] YES  [ ] NO

## 2020-02-17 NOTE — PROGRESS NOTE ADULT - SUBJECTIVE AND OBJECTIVE BOX
[facility] : Progress Note :: [phone], (office [phone]),   Dr Barone / Dr Fontanez / Dr Schreiber / Dr Koo / Dr Harvey HOOKER / Dr Mcnally / Dr Hernandez / Dr Steven yip  _____________________________________________________________________________________________    feels better.    No Known Drug Allergies  strawberry (Unknown)    Hospital Medications:   MEDICATIONS  (STANDING):  ammonium lactate 12% Lotion 1 Application(s) Topical two times a day  aspirin  chewable 81 milliGRAM(s) Oral daily  atorvastatin 40 milliGRAM(s) Oral at bedtime  budesonide  80 MICROgram(s)/formoterol 4.5 MICROgram(s) Inhaler 2 Puff(s) Inhalation two times a day  buMETAnide 1 milliGRAM(s) Oral two times a day  heparin  Injectable 5000 Unit(s) SubCutaneous every 8 hours  influenza   Vaccine 0.5 milliLiter(s) IntraMuscular once  insulin glargine Injectable (LANTUS) 12 Unit(s) SubCutaneous at bedtime  insulin lispro (HumaLOG) corrective regimen sliding scale   SubCutaneous Before meals and at bedtime  insulin lispro Injectable (HumaLOG) 1 Unit(s) SubCutaneous three times a day before meals  multivitamin/minerals 1 Tablet(s) Oral daily  pantoprazole    Tablet 40 milliGRAM(s) Oral before breakfast  predniSONE   Tablet 10 milliGRAM(s) Oral two times a day  sacubitril 24 mG/valsartan 26 mG 1 Tablet(s) Oral two times a day  tiotropium 18 MICROgram(s) Capsule 1 Capsule(s) Inhalation daily      VITALS:  T(F): 97.2 (20 @ 05:09), Max: 98.9 (20 @ 02:03)  HR: 84 (20 @ 05:09)  BP: 100/63 (20 @ 05:09)  RR: 18 (20 @ 05:09)  SpO2: 98% (20 @ 05:09)  Wt(kg): --      PHYSICAL EXAM:  Constitutional: NAD  HEENT: anicteric sclera, oropharynx clear.  Neck: No JVD  Respiratory: CTAB, no wheezes, rales or rhonchi  Cardiovascular: S1, S2, RRR  Gastrointestinal: BS+, soft, NT/ND  Extremities:  peripheral edema  Neurological: A/O x 3, no focal deficits  Psychiatric: Normal mood, normal affect  : No CVA tenderness. No osborne.       LABS:      137  |  96  |  62<H>  ----------------------------<  88  4.0   |  35<H>  |  1.11    Ca    9.2      2020 06:55  Phos  3.4       Mg     2.2         TPro  5.7<L>  /  Alb  2.5<L>  /  TBili  0.7  /  DBili      /  AST  18  /  ALT  31  /  AlkPhos  113      Creatinine Trend: 1.11 <--, 1.20 <--, 0.85 <--, 1.06 <--, 1.81 <--, 2.09 <--                        12.7   7.87  )-----------( 155      ( 2020 06:55 )             39.2     Urine Studies:  Urinalysis Basic - ( 2020 18:40 )    Color: Yellow / Appearance: Clear / S.010 / pH:   Gluc:  / Ketone: Negative  / Bili: Negative / Urobili: Negative   Blood:  / Protein: Negative / Nitrite: Negative   Leuk Esterase: Trace / RBC: 2-5 /HPF / WBC 3-5 /HPF   Sq Epi:  / Non Sq Epi: Few /HPF / Bacteria: Moderate /HPF      Potassium, Random Urine: 17 mmol/L ( @ 18:40)  Sodium, Random Urine: 49 mmol/L ( @ 18:40)  Creatinine, Random Urine: 72 mg/dL ( @ 18:40)  Osmolality, Random Urine: 460 mos/kg ( @ 18:39)    RADIOLOGY & ADDITIONAL STUDIES:

## 2020-02-17 NOTE — PROGRESS NOTE ADULT - PROBLEM SELECTOR PLAN 1
Pt is c/o Bilateral  leg swelling and  compliant with meds.  On exam fluid overloaded with bilateral crackles on lung exam and bilateral pitting edema. proBNP on admission 720 with evidence of fluid overload   troponin  -ve  starting asa, statin   c/w Home medication Entresto  Started on Bumex 1mg q12 converted to po   METOLAZONE  last day today  strict I/Os, daily weights   echo SHOWS G1DD, normal left ventricle thickness and dimentions.  tele monitoring  cards eval - Dr. Nelson is consulted  STRESS TEST TODAY

## 2020-02-17 NOTE — PROGRESS NOTE ADULT - SUBJECTIVE AND OBJECTIVE BOX
Patient is a 79y old  Female who presents with a chief complaint of Worsening of Bilateral legs/SOB, F/U NST result, BP improved today      INTERVAL HPI/OVERNIGHT EVENTS:  T(C): 36.2 (02-17-20 @ 05:09), Max: 37.2 (02-17-20 @ 02:03)  HR: 84 (02-17-20 @ 05:09) (82 - 97)  BP: 100/63 (02-17-20 @ 05:09) (85/59 - 117/73)  RR: 18 (02-17-20 @ 05:09) (18 - 18)  SpO2: 98% (02-17-20 @ 05:09) (97% - 100%)  Wt(kg): --    LABS:                        12.7   7.87  )-----------( 155      ( 17 Feb 2020 06:55 )             39.2     02-17    137  |  96  |  62<H>  ----------------------------<  88  4.0   |  35<H>  |  1.11    Ca    9.2      17 Feb 2020 06:55  Phos  3.4     02-17  Mg     2.2     02-17    TPro  5.7<L>  /  Alb  2.5<L>  /  TBili  0.7  /  DBili  x   /  AST  18  /  ALT  31  /  AlkPhos  113  02-17        CAPILLARY BLOOD GLUCOSE      POCT Blood Glucose.: 126 mg/dL (17 Feb 2020 08:16)  POCT Blood Glucose.: 138 mg/dL (16 Feb 2020 21:37)  POCT Blood Glucose.: 162 mg/dL (16 Feb 2020 16:43)  POCT Blood Glucose.: 207 mg/dL (16 Feb 2020 11:52)        RADIOLOGY & ADDITIONAL TESTS:    Consultant(s) Notes Reviewed:  [x ] YES  [ ] NO    PHYSICAL EXAM:  GENERAL: well built, well nourished  HEAD:  Atraumatic, Normocephalic  EYES: EOMI, PERRLA, conjunctiva and sclera clear  ENT: No tonsillar erythema, exudates, or enlargement; Moist mucous membranes, Good dentition, No lesions  NECK: Supple, No JVD, Normal thyroid, no enlarged nodes  NERVOUS SYSTEM:  Alert & Oriented X3, Good concentration; Motor Strength 5/5 B/L upper and lower extremities; DTRs 2+ intact and symmetric, sensory intact  CHEST/LUNG: B/L good air entry; No rales, rhonchi, or wheezing  HEART: S1S2 normal, no S3, Regular rate and rhythm; No murmurs, rubs, or gallops  ABDOMEN: Soft, Nontender, Nondistended; Bowel sounds present  EXTREMITIES:  2+ Peripheral Pulses, No clubbing, cyanosis,  edema improved  LYMPH: No lymphadenopathy noted  SKIN: No rashes or lesions    Care Discussed with Consultants/Other Providers [ x] YES  [ ] NO

## 2020-02-17 NOTE — DISCHARGE NOTE PROVIDER - HOSPITAL COURSE
Patient is a 79 years old female from home, who lives with her daughter and ambulates independently with a Past Medical History of  Chronic Obstructive Pulmonary Disease (COPD) (3.5L), HFrEF (45%, Ischemic), Coronary Artery Disease (CAD), Aortic dissection status post Aortic Valve Replacement (AVR) (on ASA 81mg), hypertension (HTN), Diabetes Mellitus 2 who presented with worsening leg swelling since 2 weeks ago. Patient was seen by her cardiologist 2 weeks back and was started on Entresto because she was hypertensive in the setting of reduced Ejection Fraction (EF) and also adjusted her Bumex by cutting down to half dose. Patient reported she was not eating, sleeping, not able to walk, had no strength in her legs. Patient normally used to take a few steps but at the time she could barely take 2 steps and needed some assistance. Patient has not been using her walker since 1 year ago. Patient is also using Meloxicam for muscle spasms with a dose of one pill every day. Patient reported the leg swelling is worse on the left. Patient's breathing is at baseline. Patient is compliant with medications. Patient denies any fever, chills, nausea, vomiting, diarrhea, chest pain, palpitations, abdominal pain, dysuria, frequency. [facility] Full code. Patient is a 79 years old female from home, who lives with her daughter and ambulates independently with a Past Medical History of  Chronic Obstructive Pulmonary Disease (COPD) (3.5L), HFrEF (45%, Ischemic), Coronary Artery Disease (CAD), Aortic dissection status post Aortic Valve Replacement (AVR) (on ASA 81mg), hypertension (HTN), Diabetes Mellitus 2 who presented with worsening leg swelling since 2 weeks ago. Patient was seen by her cardiologist 2 weeks back and was started on Entresto because she was hypertensive in the setting of reduced Ejection Fraction (EF) and also adjusted her Bumex by cutting down to half dose. Patient reported she was not eating, sleeping, not able to walk, had no strength in her legs. Patient normally used to take a few steps but at the time she could barely take 2 steps and needed some assistance. Patient has not been using her walker since 1 year ago. Patient is also using Meloxicam for muscle spasms with a dose of one pill every day. Patient reported the leg swelling is worse on the left. Patient's breathing is at baseline. Patient is compliant with medications. Patient denies any fever, chills, nausea, vomiting, diarrhea, chest pain, palpitations, abdominal pain, dysuria, frequency. [facility] Full code.        Patient had acute on chronic congestive heart failure. Patient complained of Bilateral leg swelling and is compliant with meds. On exam fluid overloaded with bilateral crackles on lung exam and bilateral pitting edema were observed. Patient's ProBNP on admission was 720 with evidence of fluid overload. Patient's Troponin was negative. Patient was started on ASA and Statin. Patient was continued with Home medication Entresto. Patient was started on Bumex 1mg q12 converted to po, Metolazone. Patient's strict Inputs/Outputs and daily weights were monitored. Echocardiogram shows G1DD, normal left ventricle thickness and dimensions. Patient was telemonitored. Dr. Nelson was consulted and patient had a cardiac evaluation. Ischemic evaluation is pending (?).         Patient had an acute kidney injury (KYLE) which was resolved.         Patient had Hyponatremia which was resolved.         Patient had hyperglycemia due uncontrolled Diabetes. Patient takes steroids which may worsen diabetes. Patient takes Metformin 500 mg at home. Patient oral hypoglycemics Metformin was put on hold. Accucheck was used on patient before meals and at bedtime. ISS. Patient's hemoglobin HbA1c was 11.5. Patient's hyperglycemia was resolved.         Patient's Hyperkalemia was resolved.         Patient's has COPD (chronic obstructive pulmonary disease). Patient presented with wheezing on exam and is a former smoker. Chest X-Ray showed Left basilar atelectasis. Respiratory Virus Panel (RVP) was negative. Patient was continued with Duonebs, Prednisone 10mg q12. Patient's Prednisone dose was recently changed from 20mg q12 to 10mg q12.        Patient has diabetes mellitus (DM) 2. Patient's was added insuline coverage, 12 of Lantus, 1 unit of Humalog.         Patient has leg edema. Patient is complaining of Left leg pain more than Right. Ultrasound of left leg showed no Deep Vein Thrombosis (DVT). Patient is a 79 years old female from home, who lives with her daughter and ambulates independently with a Past Medical History of  Chronic Obstructive Pulmonary Disease (COPD) (3.5L), HFrEF (45%, Ischemic), Coronary Artery Disease (CAD), Aortic dissection status post Aortic Valve Replacement (AVR) (on ASA 81mg), hypertension (HTN), Diabetes Mellitus 2 who presented with worsening leg swelling since 2 weeks ago. Patient was seen by her cardiologist 2 weeks back and was started on Entresto because she was hypertensive in the setting of reduced Ejection Fraction (EF) and also adjusted her Bumex by cutting down to half dose. Patient reported she was not eating, sleeping, not able to walk, had no strength in her legs. Patient normally used to take a few steps but at the time she could barely take 2 steps and needed some assistance. Patient has not been using her walker since 1 year ago. Patient is also using Meloxicam for muscle spasms with a dose of one pill every day. Patient reported the leg swelling is worse on the left. Patient's breathing is at baseline. Patient is compliant with medications. Patient denies any fever, chills, nausea, vomiting, diarrhea, chest pain, palpitations, abdominal pain, dysuria, frequency. [facility] Full code.        Patient had acute on chronic congestive heart failure. Patient complained of Bilateral leg swelling and is compliant with meds. On exam fluid overloaded with bilateral crackles on lung exam and bilateral pitting edema were observed. Patient's ProBNP on admission was 720 with evidence of fluid overload. Patient's Troponin was negative. Patient was started on ASA and Statin. Patient was continued with Home medication Entresto. Patient was started on Bumex 1mg q12 converted to po, Metolazone. Patient's strict Inputs/Outputs and daily weights were monitored. Echocardiogram shows G1DD, normal left ventricle thickness and dimensions. Patient was telemonitored. Dr. Nelson was consulted and patient had a cardiac evaluation. Ischemic evaluation is pending (?). Patient had an acute kidney injury (KYLE) which was resolved. Patient had Hyponatremia which was resolved. Patient had hyperglycemia due uncontrolled Diabetes. Patient takes steroids which may worsen diabetes. Patient takes Metformin 500 mg at home. Patient oral hypoglycemics Metformin was put on hold. Accucheck was used on patient before meals and at bedtime. ISS. Patient's hemoglobin HbA1c was 11.5. Patient's hyperglycemia was resolved. Patient's Hyperkalemia was resolved. Patient's has COPD (chronic obstructive pulmonary disease). Patient presented with wheezing on exam and is a former smoker. Chest X-Ray showed Left basilar atelectasis. Respiratory Virus Panel (RVP) was negative. Patient was continued with Duonebs, Prednisone 10mg q12. Patient's Prednisone dose was recently changed from 20mg q12 to 10mg q12. Patient has diabetes mellitus (DM) 2. Patient's was added insuline coverage, 12 of Lantus, 1 unit of Humalog. Patient has leg edema. Patient is complaining of Left leg pain more than Right. Ultrasound of left leg showed no Deep Vein Thrombosis (DVT). Patient is a 79 years old female from home, who lives with her daughter and ambulates independently with a Past Medical History of  Chronic Obstructive Pulmonary Disease (COPD) (3.5L), HFrEF (45%, Ischemic), Coronary Artery Disease (CAD), Aortic dissection status post Aortic Valve Replacement (AVR) (on ASA 81mg), hypertension (HTN), Diabetes Mellitus 2 who presented with worsening leg swelling since 2 weeks ago. Patient was seen by her cardiologist 2 weeks back and was started on Entresto because she was hypertensive in the setting of reduced Ejection Fraction (EF) and also adjusted her Bumex by cutting down to half dose. Patient reported she was not eating, sleeping, not able to walk, had no strength in her legs. Patient normally used to take a few steps but at the time she could barely take 2 steps and needed some assistance. Patient has not been using her walker since 1 year ago. Patient is also using Meloxicam for muscle spasms with a dose of one pill every day. Patient reported the leg swelling is worse on the left. Patient's breathing is at baseline. Patient is compliant with medications. Patient denies any fever, chills, nausea, vomiting, diarrhea, chest pain, palpitations, abdominal pain, dysuria, frequency. [facility] Full code.        Patient had acute on chronic congestive heart failure. Patient came with Bilateral leg swelling and fluid overloaded with bilateral crackles on lung exam with ProBNP on admission 720 and negative Troponin. Patient was started on Bumex 1mg q12 converted to po. Metolazone and entresto were discontinues after couple of days. On Echocardiogram pt has G1DD, normal left ventricle thickness and dimensions and unremarkable stress test. Pt leg pain and  edema got better. Ultrasound of left leg showed no Deep Vein Thrombosis (DVT). Patient acute kidney injury (KYLE) ,hyperkalemia and hyponatremia was resolved. Patient's is a smoker ,hx COPD (chronic obstructive pulmonary disease) presented with wheezing on exam. Chest X-Ray showed Left basilar atelectasis. Respiratory Virus Panel (RVP) was negative. Patient was continued with Duonebs, Prednisone 10mg q12. Patient's Prednisone dose was recently changed from 20mg q12 to 10mg q12.For DM pt was on insulin and hba1c 11.5

## 2020-02-17 NOTE — DISCHARGE NOTE PROVIDER - NSDCCPCAREPLAN_GEN_ALL_CORE_FT
PRINCIPAL DISCHARGE DIAGNOSIS  Diagnosis: Leg edema  Assessment and Plan of Treatment: You had leg edema. You were complaining of Left leg pain more than Right. Your Ultrasound of left leg showed no Deep Vein Thrombosis (DVT). Please continue with your medications and follow up with your Primary Care Physcian (PCP) if you have any concern or need any medications adjustments.         SECONDARY DISCHARGE DIAGNOSES  Diagnosis: Acute on chronic congestive heart failure  Assessment and Plan of Treatment: You had an acute on chronic congestive heart failure. You complained of Bilateral leg swelling and are compliant with medications. On exam fluid overloaded with bilateral crackles on lung exam and bilateral pitting edema were observed on you. Your ProBNP on admission was 720 with evidence of fluid overload. Your Troponin was negative. Your were started on ASA (Aspirin) and Statin. You were continued with Home medication Entresto. You were started on Bumex 1mg q12 converted to po, Metolazone. Your strict Inputs/Outputs and daily weights were monitored. Your Echocardiogram shows G1DD, normal left ventricle thickness and dimensions. Your wrere telemonitored. Dr. Nelson was consulted and you had a cardiac evaluation. Your Ischemic evaluation is pending (?). Please continue with your medications and follow up with your Primary Care Physcian (PCP) if you have any concerns or need any medications adjustments.    Diagnosis: Hyperglycemia  Assessment and Plan of Treatment: You had hyperglycemia due uncontrolled Diabetes. You take steroids which may worsen your diabetes. You take Metformin 500 mg at home. Your oral hypoglycemics Metformin was put on hold. Accucheck was used on you before meals and at bedtime. ISS. Your hemoglobin HbA1c was 11.5. Your hyperglycemia was resolved. Please continue with your medications and follow up with your Primary Care Physcian (PCP) if you have any concern or need any medications adjustments.    Diagnosis: Renal failure  Assessment and Plan of Treatment: You had an acute kidney injury (KYLE) which was resolved. Please continue with your medications and follow up with your Primary Care Physcian (PCP) if you have any concerns or need any medications adjustments.    Diagnosis: COPD (chronic obstructive pulmonary disease)  Assessment and Plan of Treatment: You have COPD (chronic obstructive pulmonary disease). You presented with wheezing on exam and are a former smoker. Your Chest X-Ray showed Left basilar atelectasis. Your Respiratory Virus Panel (RVP) was negative. You were continued with Duonebs, Prednisone 10mg q12. Your Prednisone dose was recently changed from 20 mg q12 to 10 mg q12. Please continue with your medications and follow up with your Primary Care Physcian (PCP) if you have any concerns or need any medications adjustments.    Diagnosis: Hyponatremia  Assessment and Plan of Treatment: You had Hyponatremia which was resolved. Please continue with your medications and follow up with your Primary Care Physcian (PCP) if you have any concerns or need any medications adjustments.       Diagnosis: Hyperkalemia  Assessment and Plan of Treatment: Your Hyperkalemia was resolved. Please continue with your medications and follow up with your Primary Care Physcian (PCP) if you have any concern or need any medications adjustments. PRINCIPAL DISCHARGE DIAGNOSIS  Diagnosis: Leg edema  Assessment and Plan of Treatment: You had leg edema. You were complaining of Left leg pain more than Right. Your Ultrasound of left leg was unremarkable for any Deep Vein Thrombosis (DVT). Your leg edema was most likely due to fluid overload which got better with diuresis. Your CHF exacerbation was resolved during your stay. Please continue with your medications and follow up with your Primary Care Physcian (PCP) if you have any concern or need any medications adjustments.      SECONDARY DISCHARGE DIAGNOSES  Diagnosis: Acute on chronic congestive heart failure  Assessment and Plan of Treatment: You had an acute on chronic congestive heart failure. You complained of Bilateral leg swelling and had  fluid overloaded with bilateral crackles on lung exam and bilateral pitting edema in legs. Your ProBNP on admission was 720 with negative Troponins. You were given Bumex IV then converted to oral 1mg q12. Metolazone and entresto was discontinued after a few days. Your strict Inputs/Outputs and daily weights were monitored. Your Echocardiogram shows G1DD, normal left ventricle thickness and dimensions and stress test was unrmarkable for any ischemic changes..Please continue with your medications accord to medicine reconcilliation and follow up with your Primary Care Physcian (PCP) if you have any concerns or need any medications adjustments.    Diagnosis: Hyperkalemia  Assessment and Plan of Treatment: Your Hyperkalemia was resolved. Your electrolytes were monitored daily. Please continue with your medications and follow up with your Primary Care Physcian (PCP) if you have any concern or need any medications adjustments.    Diagnosis: Hyponatremia  Assessment and Plan of Treatment: You had Hyponatremia which was resolved. Please continue with your medications and follow up with your Primary Care Physcian (PCP) if you have any concerns or need any medications adjustments.       Diagnosis: COPD (chronic obstructive pulmonary disease)  Assessment and Plan of Treatment: You have COPD (chronic obstructive pulmonary disease). You presented with wheezing on exam and are a former smoker. Your Chest X-Ray showed Left basilar atelectasis. Your Respiratory Virus Panel (RVP) was negative. You were continued with Duonebs, Prednisone 10mg q12. Your Prednisone dose was recently changed from 20 mg q12 to 10 mg q12. Please continue with your medications and follow up with your Primary Care Physcian (PCP) if you have any concerns or need any medications adjustments.    Diagnosis: Hyperglycemia  Assessment and Plan of Treatment: You had hyperglycemia due uncontrolled Diabetes. You take steroids which may worsen your diabetes. You take Metformin 500 mg at home. Your oral hypoglycemics Metformin was put on hold. Accucheck was used on you before meals and at bedtime. ISS. Your hemoglobin HbA1c was 11.5. Your hyperglycemia was resolved. Please continue with your medications and follow up with your Primary Care Physcian (PCP) if you have any concern or need any medications adjustments.    Diagnosis: Renal failure  Assessment and Plan of Treatment: You had an acute kidney injury (KYLE) which was resolved. Please continue with your medications and follow up with your Primary Care Physcian (PCP) if you have any concerns or need any medications adjustments. PRINCIPAL DISCHARGE DIAGNOSIS  Diagnosis: Leg edema  Assessment and Plan of Treatment: You had leg edema. You were complaining of Left leg pain more than Right. Your Ultrasound of left leg was unremarkable for any Deep Vein Thrombosis (DVT). Your leg edema was most likely due to fluid overload which got better with diuresis. Your CHF exacerbation was resolved during your stay. Please continue with your medications and follow up with your Primary Care Physcian (PCP) if you have any concern or need any medications adjustments.      SECONDARY DISCHARGE DIAGNOSES  Diagnosis: Acute on chronic congestive heart failure  Assessment and Plan of Treatment: You had an acute on chronic congestive heart failure. You complained of Bilateral leg swelling and had  fluid overloaded with bilateral crackles on lung exam and bilateral pitting edema in legs. Your ProBNP on admission was 720 with negative Troponins. You were given Bumex IV then converted to oral 1mg q12. Metolazone and entresto was discontinued after a few days. Your strict Inputs/Outputs and daily weights were monitored. Your Echocardiogram shows G1DD, normal left ventricle thickness and dimensions and stress test was unrmarkable for any ischemic changes. Please continue with your medications accord to medicine reconcilliation and follow up with your Primary Care Physcian (PCP) if you have any concerns or need any medications adjustments.    Diagnosis: Hyperkalemia  Assessment and Plan of Treatment: Your Hyperkalemia was resolved. Your electrolytes were monitored daily. Please continue with your medications and follow up with your Primary Care Physcian (PCP) if you have any concern or need any medications adjustments.    Diagnosis: Hyponatremia  Assessment and Plan of Treatment: You had Hyponatremia which was resolved. We monitored your sodium which remained WNL. Please continue with your medications and follow up with your Primary Care Physcian (PCP) if you have any concerns or need any medications adjustments.       Diagnosis: COPD (chronic obstructive pulmonary disease)  Assessment and Plan of Treatment: You have a hx of COPD (chronic obstructive pulmonary disease). You presented with wheezing . Your Chest X-Ray showed Left basilar atelectasis. Your Respiratory Virus Panel (RVP) was negative. You were continued with Duonebs, Prednisone 10mg q12. Your Prednisone dose was recently changed from 20 mg q12 to 10 mg q12. Please continue with your medications and follow up with your Primary Care Physcian (PCP) if you have any concerns or need any medications adjustments.    Diagnosis: Hyperglycemia  Assessment and Plan of Treatment: You had hyperglycemia due uncontrolled Diabetes. You take steroids which may worsen your diabetes. You take Metformin 500 mg at home. Your oral hypoglycemics Metformin was put on hold. Accucheck was used on you before meals and at bedtime. ISS. Your hemoglobin HbA1c was 11.5. Your hyperglycemia was resolved. Please continue with your medications and follow up with your Primary Care Physcian (PCP) if you have any concern or need any medications adjustments.    Diagnosis: Renal failure  Assessment and Plan of Treatment: You had an acute kidney injury (KYLE) which was resolved. Please continue with your medications and follow up with your Primary Care Physcian (PCP) if you have any concerns or need any medications adjustments. PRINCIPAL DISCHARGE DIAGNOSIS  Diagnosis: Leg edema  Assessment and Plan of Treatment: You had leg edema. You were complaining of Left leg pain more than Right. Your Ultrasound of left leg was unremarkable for any Deep Vein Thrombosis (DVT). Your leg edema was most likely due to fluid overload which got better with diuresis. Your CHF exacerbation was resolved during your stay. Please continue with your medications and follow up with your Primary Care Physcian (PCP) if you have any concern or need any medications adjustments.      SECONDARY DISCHARGE DIAGNOSES  Diagnosis: Acute on chronic congestive heart failure  Assessment and Plan of Treatment: You had an acute on chronic congestive heart failure. You complained of Bilateral leg swelling and had  fluid overloaded with bilateral crackles on lung exam and bilateral pitting edema in legs. Your ProBNP on admission was 720 with negative Troponins. You were given Bumex IV then converted to oral 1mg q12. Metolazone and entresto was discontinued after a few days. strict Inputs/Outputs and daily weights were monitored. Your Echocardiogram shows G1DD, normal left ventricle thickness and dimensions and stress test was unrmarkable for any ischemic changes. Please continue with your medications accord to medicine reconcilliation and follow up with your Primary Care Physcian (PCP) if you have any concerns or need any medications adjustments.    Diagnosis: Hyperkalemia  Assessment and Plan of Treatment: Your Hyperkalemia was resolved. Your electrolytes were monitored daily. Please continue with your medications and follow up with your Primary Care Physcian (PCP) if you have any concern or need any medications adjustments.    Diagnosis: Hyponatremia  Assessment and Plan of Treatment: You had Hyponatremia which was resolved. We monitored your sodium which remained WNL. Please continue with your medications and follow up with your Primary Care Physcian (PCP) if you have any concerns or need any medications adjustments.       Diagnosis: COPD (chronic obstructive pulmonary disease)  Assessment and Plan of Treatment: You have a hx of COPD (chronic obstructive pulmonary disease). You presented with wheezing . Your Chest X-Ray showed Left basilar atelectasis. Your Respiratory Virus Panel (RVP) was negative. You were continued with Duonebs, Prednisone 10mg q12. Your Prednisone dose was recently changed from 20 mg q12 to 10 mg q12. Please continue with your medications and follow up with your Primary Care Physcian (PCP) if you have any concerns or need any medications adjustments.    Diagnosis: Hyperglycemia  Assessment and Plan of Treatment: You had hyperglycemia due uncontrolled Your sugar was controlled in hospital, we gave you insulin in hospital. Continue with your home medications on discharge and adhere to low carbohydrate diet.. You take steroids which may worsen your diabetes. You take Metformin 500 mg at home which was increased to 1000mg BID . Your hemoglobin HbA1c IS 11.5. please monitor your BS on daily basis. Please continue with your medications and follow up with your Primary Care Physcian (PCP) if you have any concern or need any medications adjustments.    Diagnosis: Renal failure  Assessment and Plan of Treatment: You had an acute kidney injury (KYLE) which was resolved. Please continue with your medications and follow up with your Primary Care Physcian (PCP) if you have any concerns or need any medications adjustments.

## 2020-02-17 NOTE — PROGRESS NOTE ADULT - SUBJECTIVE AND OBJECTIVE BOX
Patient denies chest pain or shortness of breath.   Review of systems otherwise (-)  	  acetaminophen   Tablet .. 650 milliGRAM(s) Oral every 6 hours PRN  ALBUTerol    90 MICROgram(s) HFA Inhaler 2 Puff(s) Inhalation every 6 hours PRN  ammonium lactate 12% Lotion 1 Application(s) Topical two times a day  aspirin  chewable 81 milliGRAM(s) Oral daily  atorvastatin 40 milliGRAM(s) Oral at bedtime  budesonide  80 MICROgram(s)/formoterol 4.5 MICROgram(s) Inhaler 2 Puff(s) Inhalation two times a day  buMETAnide 1 milliGRAM(s) Oral two times a day  heparin  Injectable 5000 Unit(s) SubCutaneous every 8 hours  influenza   Vaccine 0.5 milliLiter(s) IntraMuscular once  insulin glargine Injectable (LANTUS) 12 Unit(s) SubCutaneous at bedtime  insulin lispro (HumaLOG) corrective regimen sliding scale   SubCutaneous Before meals and at bedtime  insulin lispro Injectable (HumaLOG) 1 Unit(s) SubCutaneous three times a day before meals  multivitamin/minerals 1 Tablet(s) Oral daily  pantoprazole    Tablet 40 milliGRAM(s) Oral before breakfast  predniSONE   Tablet 10 milliGRAM(s) Oral two times a day  sacubitril 24 mG/valsartan 26 mG 1 Tablet(s) Oral two times a day  tiotropium 18 MICROgram(s) Capsule 1 Capsule(s) Inhalation daily                            12.7   7.87  )-----------( 155      ( 17 Feb 2020 06:55 )             39.2       Hemoglobin: 12.7 g/dL (02-17 @ 06:55)  Hemoglobin: 12.9 g/dL (02-16 @ 07:26)  Hemoglobin: 12.5 g/dL (02-15 @ 06:58)  Hemoglobin: 13.1 g/dL (02-14 @ 07:23)  Hemoglobin: 12.8 g/dL (02-13 @ 06:14)      02-17    137  |  96  |  62<H>  ----------------------------<  88  4.0   |  35<H>  |  1.11    Ca    9.2      17 Feb 2020 06:55  Phos  3.4     02-17  Mg     2.2     02-17    TPro  5.7<L>  /  Alb  2.5<L>  /  TBili  0.7  /  DBili  x   /  AST  18  /  ALT  31  /  AlkPhos  113  02-17    Creatinine Trend: 1.11<--, 1.20<--, 0.85<--, 1.06<--, 1.81<--, 2.09<--    COAGS:           T(C): 36.2 (02-17-20 @ 05:09), Max: 37.2 (02-17-20 @ 02:03)  HR: 84 (02-17-20 @ 05:09) (84 - 97)  BP: 100/63 (02-17-20 @ 05:09) (85/59 - 117/73)  RR: 18 (02-17-20 @ 05:09) (18 - 18)  SpO2: 98% (02-17-20 @ 05:09) (97% - 100%)  Wt(kg): --    I&O's Summary    Gen: Appears well in NAD  HEENT:  (-)icterus (-)pallor  CV: N S1 S2 1/6 ANNETTE (+)2 Pulses B/l  Resp:  Clear to ausculatation B/L, normal effort  GI: (+) BS Soft, NT, ND  Lymph:  (-)Edema, (-)obvious lymphadenopathy  Skin: Warm to touch, Normal turgor  Psych: Appropriate mood and affect      TELEMETRY: 	  Sinus APC    ASSESSMENT/PLAN: 	79y  Female PMH COPD(3.5L), HFrEF(45%, Ischemic),CAD, Aortic dissection s/p repair and tissue AVR(on ASA 81mg), HTN, DM2 who presents with worsening leg swelling since 2 weeks.    -  Results reviewed.  patient with normal systolic function and normal perfusion ? Diastolic CHF.  Entresto unlikely to provide much benefit in this case consider D/Cing  - Euvolemic at this point  - Outpt cardiac f/u with primary cardiologist.    Fan Nelson MD, [facility]  BEEPER [phone] Patient denies chest pain or shortness of breath.   Review of systems otherwise (-)  	  acetaminophen   Tablet .. 650 milliGRAM(s) Oral every 6 hours PRN  ALBUTerol    90 MICROgram(s) HFA Inhaler 2 Puff(s) Inhalation every 6 hours PRN  ammonium lactate 12% Lotion 1 Application(s) Topical two times a day  aspirin  chewable 81 milliGRAM(s) Oral daily  atorvastatin 40 milliGRAM(s) Oral at bedtime  budesonide  80 MICROgram(s)/formoterol 4.5 MICROgram(s) Inhaler 2 Puff(s) Inhalation two times a day  buMETAnide 1 milliGRAM(s) Oral two times a day  heparin  Injectable 5000 Unit(s) SubCutaneous every 8 hours  influenza   Vaccine 0.5 milliLiter(s) IntraMuscular once  insulin glargine Injectable (LANTUS) 12 Unit(s) SubCutaneous at bedtime  insulin lispro (HumaLOG) corrective regimen sliding scale   SubCutaneous Before meals and at bedtime  insulin lispro Injectable (HumaLOG) 1 Unit(s) SubCutaneous three times a day before meals  multivitamin/minerals 1 Tablet(s) Oral daily  pantoprazole    Tablet 40 milliGRAM(s) Oral before breakfast  predniSONE   Tablet 10 milliGRAM(s) Oral two times a day  sacubitril 24 mG/valsartan 26 mG 1 Tablet(s) Oral two times a day  tiotropium 18 MICROgram(s) Capsule 1 Capsule(s) Inhalation daily                            12.7   7.87  )-----------( 155      ( 17 Feb 2020 06:55 )             39.2       Hemoglobin: 12.7 g/dL (02-17 @ 06:55)  Hemoglobin: 12.9 g/dL (02-16 @ 07:26)  Hemoglobin: 12.5 g/dL (02-15 @ 06:58)  Hemoglobin: 13.1 g/dL (02-14 @ 07:23)  Hemoglobin: 12.8 g/dL (02-13 @ 06:14)      02-17    137  |  96  |  62<H>  ----------------------------<  88  4.0   |  35<H>  |  1.11    Ca    9.2      17 Feb 2020 06:55  Phos  3.4     02-17  Mg     2.2     02-17    TPro  5.7<L>  /  Alb  2.5<L>  /  TBili  0.7  /  DBili  x   /  AST  18  /  ALT  31  /  AlkPhos  113  02-17    Creatinine Trend: 1.11<--, 1.20<--, 0.85<--, 1.06<--, 1.81<--, 2.09<--    COAGS:           T(C): 36.2 (02-17-20 @ 05:09), Max: 37.2 (02-17-20 @ 02:03)  HR: 84 (02-17-20 @ 05:09) (84 - 97)  BP: 100/63 (02-17-20 @ 05:09) (85/59 - 117/73)  RR: 18 (02-17-20 @ 05:09) (18 - 18)  SpO2: 98% (02-17-20 @ 05:09) (97% - 100%)  Wt(kg): --    I&O's Summary    Gen: Appears well in NAD  HEENT:  (-)icterus (-)pallor  CV: N S1 S2 1/6 ANNETTE (+)2 Pulses B/l  Resp:  Clear to ausculatation B/L, normal effort  GI: (+) BS Soft, NT, ND  Lymph:  (-)Edema, (-)obvious lymphadenopathy  Skin: Warm to touch, Normal turgor  Psych: Appropriate mood and affect      TELEMETRY: 	  Sinus APC    ASSESSMENT/PLAN: 	79y  Female PMH COPD(3.5L), HFrEF(45%, Ischemic),CAD, Aortic dissection s/p repair and tissue AVR(on ASA 81mg), HTN, DM2 who presents with worsening leg swelling since 2 weeks.    -  Results reviewed.  patient with normal systolic function and normal perfusion ? Diastolic CHF.  Entresto unlikely to provide much benefit in this case consider D/Cing  - Euvolemic at this point  - Edema is out of proportion to her cardiac status.  ? due to low protein and albumin.  ? consider w/u for nephrotic syndrome or protein loosing enteropathy  - Outpt cardiac f/u with primary cardiologist.    Fan Nelson MD, [facility]  BEEPER [phone]

## 2020-02-18 LAB
ALBUMIN SERPL ELPH-MCNC: 2.6 G/DL — LOW (ref 3.5–5)
ALP SERPL-CCNC: 120 U/L — SIGNIFICANT CHANGE UP (ref 40–120)
ALT FLD-CCNC: 31 U/L DA — SIGNIFICANT CHANGE UP (ref 10–60)
ANION GAP SERPL CALC-SCNC: 6 MMOL/L — SIGNIFICANT CHANGE UP (ref 5–17)
AST SERPL-CCNC: 15 U/L — SIGNIFICANT CHANGE UP (ref 10–40)
BASOPHILS # BLD AUTO: 0.03 K/UL — SIGNIFICANT CHANGE UP (ref 0–0.2)
BASOPHILS NFR BLD AUTO: 0.3 % — SIGNIFICANT CHANGE UP (ref 0–2)
BILIRUB SERPL-MCNC: 0.7 MG/DL — SIGNIFICANT CHANGE UP (ref 0.2–1.2)
BUN SERPL-MCNC: 81 MG/DL — HIGH (ref 7–18)
CALCIUM SERPL-MCNC: 9.3 MG/DL — SIGNIFICANT CHANGE UP (ref 8.4–10.5)
CHLORIDE SERPL-SCNC: 95 MMOL/L — LOW (ref 96–108)
CO2 SERPL-SCNC: 35 MMOL/L — HIGH (ref 22–31)
CREAT SERPL-MCNC: 1.78 MG/DL — HIGH (ref 0.5–1.3)
EOSINOPHIL # BLD AUTO: 0.02 K/UL — SIGNIFICANT CHANGE UP (ref 0–0.5)
EOSINOPHIL NFR BLD AUTO: 0.2 % — SIGNIFICANT CHANGE UP (ref 0–6)
GLUCOSE BLDC GLUCOMTR-MCNC: 123 MG/DL — HIGH (ref 70–99)
GLUCOSE BLDC GLUCOMTR-MCNC: 176 MG/DL — HIGH (ref 70–99)
GLUCOSE BLDC GLUCOMTR-MCNC: 204 MG/DL — HIGH (ref 70–99)
GLUCOSE BLDC GLUCOMTR-MCNC: 210 MG/DL — HIGH (ref 70–99)
GLUCOSE SERPL-MCNC: 131 MG/DL — HIGH (ref 70–99)
HCT VFR BLD CALC: 40.4 % — SIGNIFICANT CHANGE UP (ref 34.5–45)
HGB BLD-MCNC: 13.1 G/DL — SIGNIFICANT CHANGE UP (ref 11.5–15.5)
IMM GRANULOCYTES NFR BLD AUTO: 4.1 % — HIGH (ref 0–1.5)
LYMPHOCYTES # BLD AUTO: 0.87 K/UL — LOW (ref 1–3.3)
LYMPHOCYTES # BLD AUTO: 10 % — LOW (ref 13–44)
MAGNESIUM SERPL-MCNC: 2.2 MG/DL — SIGNIFICANT CHANGE UP (ref 1.6–2.6)
MCHC RBC-ENTMCNC: 28.2 PG — SIGNIFICANT CHANGE UP (ref 27–34)
MCHC RBC-ENTMCNC: 32.4 GM/DL — SIGNIFICANT CHANGE UP (ref 32–36)
MCV RBC AUTO: 87.1 FL — SIGNIFICANT CHANGE UP (ref 80–100)
MONOCYTES # BLD AUTO: 0.44 K/UL — SIGNIFICANT CHANGE UP (ref 0–0.9)
MONOCYTES NFR BLD AUTO: 5.1 % — SIGNIFICANT CHANGE UP (ref 2–14)
NEUTROPHILS # BLD AUTO: 6.96 K/UL — SIGNIFICANT CHANGE UP (ref 1.8–7.4)
NEUTROPHILS NFR BLD AUTO: 80.3 % — HIGH (ref 43–77)
NRBC # BLD: 0 /100 WBCS — SIGNIFICANT CHANGE UP (ref 0–0)
PHOSPHATE SERPL-MCNC: 4.2 MG/DL — SIGNIFICANT CHANGE UP (ref 2.5–4.5)
PLATELET # BLD AUTO: 191 K/UL — SIGNIFICANT CHANGE UP (ref 150–400)
POTASSIUM SERPL-MCNC: 4.5 MMOL/L — SIGNIFICANT CHANGE UP (ref 3.5–5.3)
POTASSIUM SERPL-SCNC: 4.5 MMOL/L — SIGNIFICANT CHANGE UP (ref 3.5–5.3)
PROT SERPL-MCNC: 5.9 G/DL — LOW (ref 6–8.3)
RBC # BLD: 4.64 M/UL — SIGNIFICANT CHANGE UP (ref 3.8–5.2)
RBC # FLD: 15.3 % — HIGH (ref 10.3–14.5)
SODIUM SERPL-SCNC: 136 MMOL/L — SIGNIFICANT CHANGE UP (ref 135–145)
WBC # BLD: 8.68 K/UL — SIGNIFICANT CHANGE UP (ref 3.8–10.5)
WBC # FLD AUTO: 8.68 K/UL — SIGNIFICANT CHANGE UP (ref 3.8–10.5)

## 2020-02-18 RX ORDER — SACUBITRIL AND VALSARTAN 24; 26 MG/1; MG/1
1 TABLET, FILM COATED ORAL
Refills: 0 | Status: DISCONTINUED | OUTPATIENT
Start: 2020-02-18 | End: 2020-02-19

## 2020-02-18 RX ADMIN — Medication 2: at 17:11

## 2020-02-18 RX ADMIN — ATORVASTATIN CALCIUM 40 MILLIGRAM(S): 80 TABLET, FILM COATED ORAL at 21:37

## 2020-02-18 RX ADMIN — TIOTROPIUM BROMIDE 1 CAPSULE(S): 18 CAPSULE ORAL; RESPIRATORY (INHALATION) at 12:00

## 2020-02-18 RX ADMIN — BUDESONIDE AND FORMOTEROL FUMARATE DIHYDRATE 2 PUFF(S): 160; 4.5 AEROSOL RESPIRATORY (INHALATION) at 12:00

## 2020-02-18 RX ADMIN — Medication 1 UNIT(S): at 12:45

## 2020-02-18 RX ADMIN — HEPARIN SODIUM 5000 UNIT(S): 5000 INJECTION INTRAVENOUS; SUBCUTANEOUS at 05:47

## 2020-02-18 RX ADMIN — Medication 1: at 12:45

## 2020-02-18 RX ADMIN — INSULIN GLARGINE 12 UNIT(S): 100 INJECTION, SOLUTION SUBCUTANEOUS at 21:38

## 2020-02-18 RX ADMIN — Medication 1 APPLICATION(S): at 05:46

## 2020-02-18 RX ADMIN — Medication 10 MILLIGRAM(S): at 05:47

## 2020-02-18 RX ADMIN — BUDESONIDE AND FORMOTEROL FUMARATE DIHYDRATE 2 PUFF(S): 160; 4.5 AEROSOL RESPIRATORY (INHALATION) at 21:36

## 2020-02-18 RX ADMIN — HEPARIN SODIUM 5000 UNIT(S): 5000 INJECTION INTRAVENOUS; SUBCUTANEOUS at 21:36

## 2020-02-18 RX ADMIN — SACUBITRIL AND VALSARTAN 1 TABLET(S): 24; 26 TABLET, FILM COATED ORAL at 05:46

## 2020-02-18 RX ADMIN — Medication 1 UNIT(S): at 08:28

## 2020-02-18 RX ADMIN — Medication 2: at 21:38

## 2020-02-18 RX ADMIN — BUMETANIDE 1 MILLIGRAM(S): 0.25 INJECTION INTRAMUSCULAR; INTRAVENOUS at 05:46

## 2020-02-18 RX ADMIN — Medication 1 APPLICATION(S): at 17:12

## 2020-02-18 RX ADMIN — HEPARIN SODIUM 5000 UNIT(S): 5000 INJECTION INTRAVENOUS; SUBCUTANEOUS at 15:19

## 2020-02-18 RX ADMIN — Medication 10 MILLIGRAM(S): at 17:11

## 2020-02-18 RX ADMIN — Medication 1 TABLET(S): at 11:59

## 2020-02-18 RX ADMIN — Medication 1 UNIT(S): at 17:11

## 2020-02-18 RX ADMIN — PANTOPRAZOLE SODIUM 40 MILLIGRAM(S): 20 TABLET, DELAYED RELEASE ORAL at 05:47

## 2020-02-18 RX ADMIN — Medication 81 MILLIGRAM(S): at 12:00

## 2020-02-18 NOTE — PROGRESS NOTE ADULT - PROBLEM SELECTOR PLAN 4
RESOLVED  Uncontrolled Diabetes and she takes steroids, may be worsening due to steroids  -Patient takes at home: Metformin 500mg  -Hold oral hypoglycemics Metformin   Accucheck: Before meals and at bedtime  ISS   Hba1c 11.5
Uncontrolled Diabetes and she takes steroids, may be worsening due to steroids  -Patient takes at home: Metformin 500mg  -Hold oral hypoglycemics Metformin   Accucheck: Before meals and at bedtime  ISS   Hba1c
RESOLVED  Uncontrolled Diabetes and she takes steroids, may be worsening due to steroids  -Patient takes at home: Metformin 500mg  -Hold oral hypoglycemics Metformin   Accucheck: Before meals and at bedtime  ISS   Hba1c 11.5

## 2020-02-18 NOTE — PROGRESS NOTE ADULT - PROBLEM SELECTOR PLAN 1
Pt is c/o Bilateral  leg swelling and  compliant with meds.  On exam fluid overloaded with bilateral crackles on lung exam and bilateral pitting edema. proBNP on admission 720 with evidence of fluid overload   troponin  -ve  starting asa, statin   c/w Home medication Entresto  Started on Bumex 1mg q12 converted to po   METOLAZONE  last day today  strict I/Os, daily weights   echo SHOWS G1DD, normal left ventricle thickness and dimentions.  tele monitoring  cards eval - Dr. Nelson is consulted  STRESS TEST TODAY Pt is c/o Bilateral  leg swelling and  compliant with meds.  On exam fluid overloaded with bilateral crackles on lung exam and bilateral pitting edema. proBNP on admission 720 with evidence of fluid overload   troponin  -ve  starting asa, statin   c/w Home medication Entresto  Started on Bumex 1mg q12 converted to po   METOLAZONE  d/c  strict I/Os, daily weights   echo SHOWS G1DD, normal left ventricle thickness and dimensions.  tele monitoring  cards eval - Dr. Nelson is consulted  STRESS TEST unremarkable

## 2020-02-18 NOTE — PROGRESS NOTE ADULT - SUBJECTIVE AND OBJECTIVE BOX
Patient denies chest pain or shortness of breath.   Review of systems otherwise (-)  	  acetaminophen   Tablet .. 650 milliGRAM(s) Oral every 6 hours PRN  ALBUTerol    90 MICROgram(s) HFA Inhaler 2 Puff(s) Inhalation every 6 hours PRN  ammonium lactate 12% Lotion 1 Application(s) Topical two times a day  aspirin  chewable 81 milliGRAM(s) Oral daily  atorvastatin 40 milliGRAM(s) Oral at bedtime  budesonide  80 MICROgram(s)/formoterol 4.5 MICROgram(s) Inhaler 2 Puff(s) Inhalation two times a day  buMETAnide 1 milliGRAM(s) Oral two times a day  heparin  Injectable 5000 Unit(s) SubCutaneous every 8 hours  influenza   Vaccine 0.5 milliLiter(s) IntraMuscular once  insulin glargine Injectable (LANTUS) 12 Unit(s) SubCutaneous at bedtime  insulin lispro (HumaLOG) corrective regimen sliding scale   SubCutaneous Before meals and at bedtime  insulin lispro Injectable (HumaLOG) 1 Unit(s) SubCutaneous three times a day before meals  multivitamin/minerals 1 Tablet(s) Oral daily  pantoprazole    Tablet 40 milliGRAM(s) Oral before breakfast  predniSONE   Tablet 10 milliGRAM(s) Oral two times a day  sacubitril 24 mG/valsartan 26 mG 1 Tablet(s) Oral two times a day  tiotropium 18 MICROgram(s) Capsule 1 Capsule(s) Inhalation daily                            13.1   8.68  )-----------( 191      ( 18 Feb 2020 05:57 )             40.4       Hemoglobin: 13.1 g/dL (02-18 @ 05:57)  Hemoglobin: 12.7 g/dL (02-17 @ 06:55)  Hemoglobin: 12.9 g/dL (02-16 @ 07:26)  Hemoglobin: 12.5 g/dL (02-15 @ 06:58)  Hemoglobin: 13.1 g/dL (02-14 @ 07:23)      02-18    136  |  95<L>  |  81<H>  ----------------------------<  131<H>  4.5   |  35<H>  |  1.78<H>    Ca    9.3      18 Feb 2020 05:57  Phos  4.2     02-18  Mg     2.2     02-18    TPro  5.9<L>  /  Alb  2.6<L>  /  TBili  0.7  /  DBili  x   /  AST  15  /  ALT  31  /  AlkPhos  120  02-18    Creatinine Trend: 1.78<--, 1.11<--, 1.20<--, 0.85<--, 1.06<--, 1.81<--    COAGS:           T(C): 36.4 (02-18-20 @ 10:32), Max: 37 (02-17-20 @ 22:06)  HR: 110 (02-18-20 @ 10:32) (87 - 113)  BP: 98/50 (02-18-20 @ 10:32) (98/50 - 137/82)  RR: 17 (02-18-20 @ 10:32) (17 - 18)  SpO2: 97% (02-18-20 @ 10:32) (96% - 99%)  Wt(kg): --    I&O's Summary    Gen: Appears well in NAD  HEENT:  (-)icterus (-)pallor  CV: N S1 S2 1/6 ANNETTE (+)2 Pulses B/l  Resp:  Clear to ausculatation B/L, normal effort  GI: (+) BS Soft, NT, ND  Lymph:  (-)Edema, (-)obvious lymphadenopathy  Skin: Warm to touch, Normal turgor  Psych: Appropriate mood and affect      TELEMETRY: 	 off    ASSESSMENT/PLAN: 	79y  Female PMH COPD(3.5L), HFrEF(45%, Ischemic),CAD, Aortic dissection s/p repair and tissue AVR(on ASA 81mg), HTN, DM2 who presents with worsening leg swelling since 2 weeks.    -  Results reviewed.  patient with normal systolic function and normal perfusion ? Diastolic CHF.  Entresto unlikely to provide much benefit in this case would D/C  - Euvolemic at this point  - Edema is out of proportion to her cardiac status.  ? due to low protein and albumin.  ? consider w/u for nephrotic syndrome or protein loosing enteropathy  - Outpt cardiac f/u with primary cardiologist.  - D/C planning per med    Fan Nelson MD, [facility]  BEEPER [phone]

## 2020-02-18 NOTE — PROGRESS NOTE ADULT - PROBLEM SELECTOR PLAN 6
P/w wheezing on exam and former smoker  CXR showed Left basilar atelectasis   RVP -ve  c/w Duonebs  c/w Prednisone 10mg q12   Recently changes her Prednisone dose from 20mg q12 to 10mg q12

## 2020-02-18 NOTE — PROGRESS NOTE ADULT - PROBLEM SELECTOR PLAN 2
RESOLVED
RESOLVED
RESOLVED  suspect pre renal due to CHF vs NSAID use vs  Hypovolemia due to poor oral intake vs Diuretics,      Baseline creatinine is 1.29,   today 1.06  - U/A -ve , Urine lytes =protein is increase  - renal ultrasound  Monitor Creatinine trended down to normal levels  Renally dose medications, avoid nephrotoxins  Nephro Dr Barone is consulted
suspect pre renal due to CHF vs NSAID use vs  Hypovolemia due to poor oral intake vs Diuretics,   On admission Cr is 2.09    and Baseline creatinine is 1.29,   today 1.81  f/u U/A -ve , Urine lytes =protein is increase  f/u renal ultrasound  Monitor Creatinine  Renally dose medications, avoid nephrotoxins  Nephro Dr Barone is consulted
RESOLVED

## 2020-02-18 NOTE — PROGRESS NOTE ADULT - PROBLEM SELECTOR PLAN 8
Pt is c/o Left leg pain more than Right  u/s of left leg showed no DVT.  GETTING BETTER EVERY DAY resolved  p/w Left leg pain more than Right  u/s of left leg showed no DVT.

## 2020-02-18 NOTE — PROGRESS NOTE ADULT - SUBJECTIVE AND OBJECTIVE BOX
PGY1 Note discussed with Supervising Resident and Primary Attending.    Patient is a 79y old  Female who presents with a chief complaint of Worsening of Bilateral legs/SOB (18 Feb 2020 10:39)      INTERVAL HPI/OVERNIGHT EVENTS :    ***********************************************************************************************************    MEDICATIONS  (STANDING):  ammonium lactate 12% Lotion 1 Application(s) Topical two times a day  aspirin  chewable 81 milliGRAM(s) Oral daily  atorvastatin 40 milliGRAM(s) Oral at bedtime  budesonide  80 MICROgram(s)/formoterol 4.5 MICROgram(s) Inhaler 2 Puff(s) Inhalation two times a day  buMETAnide 1 milliGRAM(s) Oral two times a day  heparin  Injectable 5000 Unit(s) SubCutaneous every 8 hours  influenza   Vaccine 0.5 milliLiter(s) IntraMuscular once  insulin glargine Injectable (LANTUS) 12 Unit(s) SubCutaneous at bedtime  insulin lispro (HumaLOG) corrective regimen sliding scale   SubCutaneous Before meals and at bedtime  insulin lispro Injectable (HumaLOG) 1 Unit(s) SubCutaneous three times a day before meals  multivitamin/minerals 1 Tablet(s) Oral daily  pantoprazole    Tablet 40 milliGRAM(s) Oral before breakfast  predniSONE   Tablet 10 milliGRAM(s) Oral two times a day  sacubitril 24 mG/valsartan 26 mG 1 Tablet(s) Oral two times a day  tiotropium 18 MICROgram(s) Capsule 1 Capsule(s) Inhalation daily    MEDICATIONS  (PRN):  acetaminophen   Tablet .. 650 milliGRAM(s) Oral every 6 hours PRN Mild Pain (1 - 3)  ALBUTerol    90 MICROgram(s) HFA Inhaler 2 Puff(s) Inhalation every 6 hours PRN Shortness of Breath      ***********************************************************************************************************    Allergies    No Known Drug Allergies  strawberry (Unknown)    Intolerances        ***********************************************************************************************************    REVIEW OF SYSTEMS :  * CONSTITUTIONAL      : No Fever, Weight loss, or Fatigue  * EYES                             : No eye pain , Visual disturbances or Discharge  * RESPIRATORY             : No Cough, Wheezing, Chills or Hemoptysis; No shortness of breath  * CARDIOVASCULAR     : No Chest pain, Palpitations, Dizziness, or Leg swelling  * GASTROINTESTINAL  : No Abdominal or Epigastric pain. No Nausea, Vomiting or Hematemesis; No Diarrhea or Constipation. No Melena or Hematochezia.  * GENITOURINARY        : No Dysuria , Frequency , Haematuria   * NEUROLOGICAL          : No Headaches, Memory loss, Loss of strength, Numbness, or Tremors  * MUSCULOSKELETAL   : No Joint pain  * PSYCHIATRY                 : No Depression or Anxiety   * HEME/LYMPH              : No Easy Bruising or Bleeding gums  * SKIN                               : No Itching, Burning, Rashes, or Lesions     ***********************************************************************************************************    Vital Signs Last 24 Hrs  T(C): 36.4 (18 Feb 2020 10:32), Max: 37 (17 Feb 2020 22:06)  T(F): 97.5 (18 Feb 2020 10:32), Max: 98.6 (17 Feb 2020 22:06)  HR: 110 (18 Feb 2020 10:32) (87 - 113)  BP: 98/50 (18 Feb 2020 10:32) (98/50 - 137/82)  BP(mean): --  RR: 17 (18 Feb 2020 10:32) (17 - 18)  SpO2: 97% (18 Feb 2020 10:32) (96% - 99%)    ***********************************************************************************************************    PHYSICAL EXAM :  * GENERAL                 : NAD, Well-groomed, Well-developed  * HEAD                       :  Atraumatic, Normocephalic  * EYES                         : EOMI, PERRLA, Conjunctiva and Sclera clear  * ENT                           : Moist Mucous Membranes  * NECK                         : Supple, No JVD, Normal Thyroid  * CHEST/LUNG           : Clear to Auscultation bilaterally; No Rales, Rhonchi, Wheezing or Rubs  * HEART                       : Regular Rate and Rhythm; No murmurs, Rubs or gallops  * ABDOMEN                : Soft, Non-tender, Non-distended; Bowel Sounds present  * NERVOUS SYSTEM  :  Alert & Oriented X3, Good Concentration; Motor Strength 5/5 B/L UL LL ; DTRs 2+ Intact and Symmetric  * EXTREMITIES            :  2+ Peripheral Pulses, No clubbing, cyanosis, or edema  * SKIN                           : No Rashes or Lesions    **********************************************************************************************************  LABS:                          13.1   8.68  )-----------( 191      ( 18 Feb 2020 05:57 )             40.4     02-18    136  |  95<L>  |  81<H>  ----------------------------<  131<H>  4.5   |  35<H>  |  1.78<H>    Ca    9.3      18 Feb 2020 05:57  Phos  4.2     02-18  Mg     2.2     02-18    TPro  5.9<L>  /  Alb  2.6<L>  /  TBili  0.7  /  DBili  x   /  AST  15  /  ALT  31  /  AlkPhos  120  02-18        CAPILLARY BLOOD GLUCOSE      POCT Blood Glucose.: 123 mg/dL (18 Feb 2020 08:04)  POCT Blood Glucose.: 174 mg/dL (17 Feb 2020 21:51)  POCT Blood Glucose.: 125 mg/dL (17 Feb 2020 16:47)  POCT Blood Glucose.: 153 mg/dL (17 Feb 2020 11:49)      **********************************************************************************************************    RADIOLOGY & ADDITIONAL TESTS:   No radiological imaging was required    Imaging Personally Reviewed   :  [ ] YES  [ ] NO    Consultant(s) Notes Reviewed :  [ ] YES  [ ] NO PGY1 Note discussed with Supervising Resident and Primary Attending.    Patient is a 79y old  Female who presents with a chief complaint of Worsening of Bilateral legs/SOB (18 Feb 2020 10:39)      INTERVAL HPI/OVERNIGHT EVENTS :    No acute event overnight reported by overnight team and nurses.   Pt remained hemodynamically stable.  Pt seen and examined  at bed side. All concerns and questions answered.   Report no new complaint. Pt denies any fever, nausea, vomiting.  pt cr was a little bit high, discussed with attending   she will get Cr/ BNP monitoring every day i nursing home and pt has protein urea which will be monitored  pt is medically clear  d/c planing     ***********************************************************************************************************    MEDICATIONS  (STANDING):  ammonium lactate 12% Lotion 1 Application(s) Topical two times a day  aspirin  chewable 81 milliGRAM(s) Oral daily  atorvastatin 40 milliGRAM(s) Oral at bedtime  budesonide  80 MICROgram(s)/formoterol 4.5 MICROgram(s) Inhaler 2 Puff(s) Inhalation two times a day  buMETAnide 1 milliGRAM(s) Oral two times a day  heparin  Injectable 5000 Unit(s) SubCutaneous every 8 hours  influenza   Vaccine 0.5 milliLiter(s) IntraMuscular once  insulin glargine Injectable (LANTUS) 12 Unit(s) SubCutaneous at bedtime  insulin lispro (HumaLOG) corrective regimen sliding scale   SubCutaneous Before meals and at bedtime  insulin lispro Injectable (HumaLOG) 1 Unit(s) SubCutaneous three times a day before meals  multivitamin/minerals 1 Tablet(s) Oral daily  pantoprazole    Tablet 40 milliGRAM(s) Oral before breakfast  predniSONE   Tablet 10 milliGRAM(s) Oral two times a day  sacubitril 24 mG/valsartan 26 mG 1 Tablet(s) Oral two times a day  tiotropium 18 MICROgram(s) Capsule 1 Capsule(s) Inhalation daily    MEDICATIONS  (PRN):  acetaminophen   Tablet .. 650 milliGRAM(s) Oral every 6 hours PRN Mild Pain (1 - 3)  ALBUTerol    90 MICROgram(s) HFA Inhaler 2 Puff(s) Inhalation every 6 hours PRN Shortness of Breath      ***********************************************************************************************************    Allergies    No Known Drug Allergies  strawberry (Unknown)    Intolerances        ***********************************************************************************************************    REVIEW OF SYSTEMS :  * *EYES                             : No eye pain , Visual disturbances  * RESPIRATORY             :rales present  * CARDIOVASCULAR     : No Chest pain, Palpitations, Dizziness, + Leg swelling  * GASTROINTESTINAL  : No Abdominal or Epigastric pain. No Nausea, Vomiting    * GENITOURINARY        : No Dysuria , Frequency ,   * NEUROLOGICAL          : No Headaches, Memory loss, Loss of strength,  * MUSCULOSKELETAL   : No Joint pain  * PSYCHIATRY                 : No Depression or Anxiety   * HEME/LYMPH              : No Easy Bruising   * SKIN                               : No Itching, Burning, +chronic skin changes bilaterally    ***********************************************************************************************************    Vital Signs Last 24 Hrs  T(C): 36.4 (18 Feb 2020 10:32), Max: 37 (17 Feb 2020 22:06)  T(F): 97.5 (18 Feb 2020 10:32), Max: 98.6 (17 Feb 2020 22:06)  HR: 110 (18 Feb 2020 10:32) (87 - 113)  BP: 98/50 (18 Feb 2020 10:32) (98/50 - 137/82)  BP(mean): --  RR: 17 (18 Feb 2020 10:32) (17 - 18)  SpO2: 97% (18 Feb 2020 10:32) (96% - 99%)    ***********************************************************************************************************    PHYSICAL EXAM :  *GENERAL                 : pleasant lady ,NAD  * HEAD                       :  Atraumatic, Normocephalic  * EYES                         :  Conjunctiva and Sclera clear  * ENT                           : Moist Mucous Membranes  * NECK                         : Supple, No JVD, Normal Thyroid  * CHEST/LUNG           : rales to Auscultation bilaterally;  * HEART                       : Regular Rate and Rhythm; No murmurs,   * ABDOMEN                : Soft, Non-tender, Non-distended; Bowel Sounds present  * NERVOUS SYSTEM  :  Alert & Oriented X3, Good Concentration;  * EXTREMITIES            :  2+ Peripheral Pulses, No clubbing, cyanosis, pitting  edema getting better almost gone  * SKIN                           :chronic skin changes bilaterrally on legs.    **********************************************************************************************************  LABS:                          13.1   8.68  )-----------( 191      ( 18 Feb 2020 05:57 )             40.4     02-18    136  |  95<L>  |  81<H>  ----------------------------<  131<H>  4.5   |  35<H>  |  1.78<H>    Ca    9.3      18 Feb 2020 05:57  Phos  4.2     02-18  Mg     2.2     02-18    TPro  5.9<L>  /  Alb  2.6<L>  /  TBili  0.7  /  DBili  x   /  AST  15  /  ALT  31  /  AlkPhos  120  02-18        CAPILLARY BLOOD GLUCOSE      POCT Blood Glucose.: 123 mg/dL (18 Feb 2020 08:04)  POCT Blood Glucose.: 174 mg/dL (17 Feb 2020 21:51)  POCT Blood Glucose.: 125 mg/dL (17 Feb 2020 16:47)  POCT Blood Glucose.: 153 mg/dL (17 Feb 2020 11:49)      **********************************************************************************************************    RADIOLOGY & ADDITIONAL TESTS:   No radiological imaging was required    Imaging Personally Reviewed   :  [x ] YES  [ ] NO    Consultant(s) Notes Reviewed :  [x ] YES  [ ] NO

## 2020-02-18 NOTE — PROGRESS NOTE ADULT - PROBLEM SELECTOR PROBLEM 2
KYLE (acute kidney injury)

## 2020-02-18 NOTE — PROGRESS NOTE ADULT - PROBLEM SELECTOR PROBLEM 1
Acute on chronic congestive heart failure

## 2020-02-18 NOTE — PROGRESS NOTE ADULT - SUBJECTIVE AND OBJECTIVE BOX
Patient is a 79y old  Female who presents with a chief complaint of Worsening of Bilateral legs/SOB, hypervalumic status improved, continue diuretics, D/C planning to rehab      INTERVAL HPI/OVERNIGHT EVENTS:  T(C): 36.4 (02-18-20 @ 10:32), Max: 37 (02-17-20 @ 22:06)  HR: 110 (02-18-20 @ 10:32) (87 - 113)  BP: 98/50 (02-18-20 @ 10:32) (98/50 - 137/82)  RR: 17 (02-18-20 @ 10:32) (17 - 18)  SpO2: 97% (02-18-20 @ 10:32) (96% - 99%)  Wt(kg): --    LABS:                        13.1   8.68  )-----------( 191      ( 18 Feb 2020 05:57 )             40.4     02-18    136  |  95<L>  |  81<H>  ----------------------------<  131<H>  4.5   |  35<H>  |  1.78<H>    Ca    9.3      18 Feb 2020 05:57  Phos  4.2     02-18  Mg     2.2     02-18    TPro  5.9<L>  /  Alb  2.6<L>  /  TBili  0.7  /  DBili  x   /  AST  15  /  ALT  31  /  AlkPhos  120  02-18        CAPILLARY BLOOD GLUCOSE      POCT Blood Glucose.: 123 mg/dL (18 Feb 2020 08:04)  POCT Blood Glucose.: 174 mg/dL (17 Feb 2020 21:51)  POCT Blood Glucose.: 125 mg/dL (17 Feb 2020 16:47)  POCT Blood Glucose.: 153 mg/dL (17 Feb 2020 11:49)        RADIOLOGY & ADDITIONAL TESTS:  < from: Nuclear Stress Test-Pharmacologic (02.17.20 @ 09:45) >  GATED ANALYSIS:  Post-stress resting myocardial perfusion gated SPECT  imaging was performed (LVEF = 69 %) with no regional wall  motion abnormalities.  ------------------------------------------------------------------------    IMPRESSIONS:Normal Study  * Negative ECG evidence of ischemia after IV  administartion of Regadenoson.  * Myocardial Perfusion SPECT results are normal.  * No clear evidence of ischemia or infarct.  * Post-stress resting myocardial perfusion gated SPECT  imaging was performed (LVEF = 69 %) with no regional wall  motion abnormalities.      < end of copied text >    Consultant(s) Notes Reviewed:  [x ] YES  [ ] NO    PHYSICAL EXAM:  GENERAL: well built, well nourished  HEAD:  Atraumatic, Normocephalic  EYES: EOMI, PERRLA, conjunctiva and sclera clear  ENT: No tonsillar erythema, exudates, or enlargement; Moist mucous membranes, Good dentition, No lesions  NECK: Supple, No JVD, Normal thyroid, no enlarged nodes  NERVOUS SYSTEM:  Alert & Oriented X3, Good concentration; Motor Strength 5/5 B/L upper and lower extremities; DTRs 2+ intact and symmetric, sensory intact  CHEST/LUNG: B/L  air entry diminished; less  rales  HEART: S1S2 normal, no S3, Regular rate and rhythm; No murmurs, rubs, or gallops  ABDOMEN: Soft, Nontender, Nondistended; Bowel sounds present  EXTREMITIES:  2+ Peripheral Pulses, No clubbing, cyanosis, significant improved edema  LYMPH: No lymphadenopathy noted  SKIN: No rashes or lesions    Care Discussed with Consultants/Other Providers [ x] YES  [ ] NO

## 2020-02-18 NOTE — PROGRESS NOTE ADULT - SUBJECTIVE AND OBJECTIVE BOX
[facility] : Progress Note :: [phone], (office [phone]),   Dr Barone / Dr Fontanez / Dr Schreiber / Dr Koo / Dr Harvey HOOKER / Dr Mcnally / Dr Hernandez / Dr Steven yip  _____________________________________________________________________________________________    plans for  discharge    No Known Drug Allergies  strawberry (Unknown)    Hospital Medications:   MEDICATIONS  (STANDING):  ammonium lactate 12% Lotion 1 Application(s) Topical two times a day  aspirin  chewable 81 milliGRAM(s) Oral daily  atorvastatin 40 milliGRAM(s) Oral at bedtime  budesonide  80 MICROgram(s)/formoterol 4.5 MICROgram(s) Inhaler 2 Puff(s) Inhalation two times a day  buMETAnide 1 milliGRAM(s) Oral two times a day  heparin  Injectable 5000 Unit(s) SubCutaneous every 8 hours  influenza   Vaccine 0.5 milliLiter(s) IntraMuscular once  insulin glargine Injectable (LANTUS) 12 Unit(s) SubCutaneous at bedtime  insulin lispro (HumaLOG) corrective regimen sliding scale   SubCutaneous Before meals and at bedtime  insulin lispro Injectable (HumaLOG) 1 Unit(s) SubCutaneous three times a day before meals  multivitamin/minerals 1 Tablet(s) Oral daily  pantoprazole    Tablet 40 milliGRAM(s) Oral before breakfast  predniSONE   Tablet 10 milliGRAM(s) Oral two times a day  sacubitril 24 mG/valsartan 26 mG 1 Tablet(s) Oral two times a day  tiotropium 18 MICROgram(s) Capsule 1 Capsule(s) Inhalation daily      VITALS:  T(F): 99.3 (20 @ 14:43), Max: 99.3 (20 @ 14:43)  HR: 97 (20 @ 14:43)  BP: 90/58 (20 @ 14:43)  RR: 17 (20 @ 10:32)  SpO2: 97% (20 @ 14:43)  Wt(kg): --     @ 07:01  -   @ 15:47  --------------------------------------------------------  IN: 400 mL / OUT: 0 mL / NET: 400 mL        PHYSICAL EXAM:  Constitutional: NAD  HEENT: anicteric sclera, oropharynx clear.  Neck: No JVD  Respiratory: CTAB, no wheezes, rales or rhonchi  Cardiovascular: S1, S2, RRR  Gastrointestinal: BS+, soft, NT/ND  Extremities:  No peripheral edema  Neurological: A/O x 3, no focal deficits  : No CVA tenderness. No osborne.   Skin: No rashes      LABS:      136  |  95<L>  |  81<H>  ----------------------------<  131<H>  4.5   |  35<H>  |  1.78<H>    Ca    9.3      2020 05:57  Phos  4.2       Mg     2.2         TPro  5.9<L>  /  Alb  2.6<L>  /  TBili  0.7  /  DBili      /  AST  15  /  ALT  31  /  AlkPhos  120      Creatinine Trend: 1.78 <--, 1.11 <--, 1.20 <--, 0.85 <--, 1.06 <--, 1.81 <--, 2.09 <--                        13.1   8.68  )-----------( 191      ( 2020 05:57 )             40.4     Urine Studies:  Urinalysis Basic - ( 2020 18:40 )    Color: Yellow / Appearance: Clear / S.010 / pH:   Gluc:  / Ketone: Negative  / Bili: Negative / Urobili: Negative   Blood:  / Protein: Negative / Nitrite: Negative   Leuk Esterase: Trace / RBC: 2-5 /HPF / WBC 3-5 /HPF   Sq Epi:  / Non Sq Epi: Few /HPF / Bacteria: Moderate /HPF      Potassium, Random Urine: 17 mmol/L ( @ 18:40)  Sodium, Random Urine: 49 mmol/L ( @ 18:40)  Creatinine, Random Urine: 72 mg/dL ( @ 18:40)  Osmolality, Random Urine: 460 mos/kg ( @ 18:39)    RADIOLOGY & ADDITIONAL STUDIES:

## 2020-02-19 ENCOUNTER — TRANSCRIPTION ENCOUNTER (OUTPATIENT)
Age: 80
End: 2020-02-19

## 2020-02-19 VITALS
DIASTOLIC BLOOD PRESSURE: 62 MMHG | TEMPERATURE: 98 F | RESPIRATION RATE: 18 BRPM | SYSTOLIC BLOOD PRESSURE: 95 MMHG | HEART RATE: 111 BPM | OXYGEN SATURATION: 100 %

## 2020-02-19 LAB
ALBUMIN SERPL ELPH-MCNC: 2.6 G/DL — LOW (ref 3.5–5)
ALP SERPL-CCNC: 115 U/L — SIGNIFICANT CHANGE UP (ref 40–120)
ALT FLD-CCNC: 29 U/L DA — SIGNIFICANT CHANGE UP (ref 10–60)
ANION GAP SERPL CALC-SCNC: 8 MMOL/L — SIGNIFICANT CHANGE UP (ref 5–17)
AST SERPL-CCNC: 15 U/L — SIGNIFICANT CHANGE UP (ref 10–40)
BASOPHILS # BLD AUTO: 0.04 K/UL — SIGNIFICANT CHANGE UP (ref 0–0.2)
BASOPHILS NFR BLD AUTO: 0.4 % — SIGNIFICANT CHANGE UP (ref 0–2)
BILIRUB SERPL-MCNC: 0.8 MG/DL — SIGNIFICANT CHANGE UP (ref 0.2–1.2)
BUN SERPL-MCNC: 86 MG/DL — HIGH (ref 7–18)
CALCIUM SERPL-MCNC: 9.4 MG/DL — SIGNIFICANT CHANGE UP (ref 8.4–10.5)
CHLORIDE SERPL-SCNC: 94 MMOL/L — LOW (ref 96–108)
CO2 SERPL-SCNC: 33 MMOL/L — HIGH (ref 22–31)
CREAT SERPL-MCNC: 1.58 MG/DL — HIGH (ref 0.5–1.3)
EOSINOPHIL # BLD AUTO: 0.02 K/UL — SIGNIFICANT CHANGE UP (ref 0–0.5)
EOSINOPHIL NFR BLD AUTO: 0.2 % — SIGNIFICANT CHANGE UP (ref 0–6)
GLUCOSE BLDC GLUCOMTR-MCNC: 146 MG/DL — HIGH (ref 70–99)
GLUCOSE BLDC GLUCOMTR-MCNC: 222 MG/DL — HIGH (ref 70–99)
GLUCOSE SERPL-MCNC: 146 MG/DL — HIGH (ref 70–99)
HCT VFR BLD CALC: 40.6 % — SIGNIFICANT CHANGE UP (ref 34.5–45)
HGB BLD-MCNC: 13.2 G/DL — SIGNIFICANT CHANGE UP (ref 11.5–15.5)
IMM GRANULOCYTES NFR BLD AUTO: 5.1 % — HIGH (ref 0–1.5)
LYMPHOCYTES # BLD AUTO: 0.71 K/UL — LOW (ref 1–3.3)
LYMPHOCYTES # BLD AUTO: 7.7 % — LOW (ref 13–44)
MAGNESIUM SERPL-MCNC: 2.4 MG/DL — SIGNIFICANT CHANGE UP (ref 1.6–2.6)
MCHC RBC-ENTMCNC: 28.4 PG — SIGNIFICANT CHANGE UP (ref 27–34)
MCHC RBC-ENTMCNC: 32.5 GM/DL — SIGNIFICANT CHANGE UP (ref 32–36)
MCV RBC AUTO: 87.3 FL — SIGNIFICANT CHANGE UP (ref 80–100)
MONOCYTES # BLD AUTO: 0.48 K/UL — SIGNIFICANT CHANGE UP (ref 0–0.9)
MONOCYTES NFR BLD AUTO: 5.2 % — SIGNIFICANT CHANGE UP (ref 2–14)
NEUTROPHILS # BLD AUTO: 7.49 K/UL — HIGH (ref 1.8–7.4)
NEUTROPHILS NFR BLD AUTO: 81.4 % — HIGH (ref 43–77)
NRBC # BLD: 0 /100 WBCS — SIGNIFICANT CHANGE UP (ref 0–0)
PHOSPHATE SERPL-MCNC: 4.2 MG/DL — SIGNIFICANT CHANGE UP (ref 2.5–4.5)
PLATELET # BLD AUTO: 188 K/UL — SIGNIFICANT CHANGE UP (ref 150–400)
POTASSIUM SERPL-MCNC: 4.4 MMOL/L — SIGNIFICANT CHANGE UP (ref 3.5–5.3)
POTASSIUM SERPL-SCNC: 4.4 MMOL/L — SIGNIFICANT CHANGE UP (ref 3.5–5.3)
PROT SERPL-MCNC: 6 G/DL — SIGNIFICANT CHANGE UP (ref 6–8.3)
RBC # BLD: 4.65 M/UL — SIGNIFICANT CHANGE UP (ref 3.8–5.2)
RBC # FLD: 15.4 % — HIGH (ref 10.3–14.5)
SODIUM SERPL-SCNC: 135 MMOL/L — SIGNIFICANT CHANGE UP (ref 135–145)
WBC # BLD: 9.21 K/UL — SIGNIFICANT CHANGE UP (ref 3.8–10.5)
WBC # FLD AUTO: 9.21 K/UL — SIGNIFICANT CHANGE UP (ref 3.8–10.5)

## 2020-02-19 PROCEDURE — 80061 LIPID PANEL: CPT

## 2020-02-19 PROCEDURE — 71045 X-RAY EXAM CHEST 1 VIEW: CPT

## 2020-02-19 PROCEDURE — 93306 TTE W/DOPPLER COMPLETE: CPT

## 2020-02-19 PROCEDURE — 36415 COLL VENOUS BLD VENIPUNCTURE: CPT

## 2020-02-19 PROCEDURE — 84132 ASSAY OF SERUM POTASSIUM: CPT

## 2020-02-19 PROCEDURE — 93005 ELECTROCARDIOGRAM TRACING: CPT

## 2020-02-19 PROCEDURE — 97162 PT EVAL MOD COMPLEX 30 MIN: CPT

## 2020-02-19 PROCEDURE — 94640 AIRWAY INHALATION TREATMENT: CPT

## 2020-02-19 PROCEDURE — 83880 ASSAY OF NATRIURETIC PEPTIDE: CPT

## 2020-02-19 PROCEDURE — 82306 VITAMIN D 25 HYDROXY: CPT

## 2020-02-19 PROCEDURE — 93971 EXTREMITY STUDY: CPT

## 2020-02-19 PROCEDURE — 81001 URINALYSIS AUTO W/SCOPE: CPT

## 2020-02-19 PROCEDURE — 84443 ASSAY THYROID STIM HORMONE: CPT

## 2020-02-19 PROCEDURE — 84133 ASSAY OF URINE POTASSIUM: CPT

## 2020-02-19 PROCEDURE — 82607 VITAMIN B-12: CPT

## 2020-02-19 PROCEDURE — 83036 HEMOGLOBIN GLYCOSYLATED A1C: CPT

## 2020-02-19 PROCEDURE — 84300 ASSAY OF URINE SODIUM: CPT

## 2020-02-19 PROCEDURE — 83935 ASSAY OF URINE OSMOLALITY: CPT

## 2020-02-19 PROCEDURE — 80053 COMPREHEN METABOLIC PANEL: CPT

## 2020-02-19 PROCEDURE — 82746 ASSAY OF FOLIC ACID SERUM: CPT

## 2020-02-19 PROCEDURE — 93017 CV STRESS TEST TRACING ONLY: CPT

## 2020-02-19 PROCEDURE — 84484 ASSAY OF TROPONIN QUANT: CPT

## 2020-02-19 PROCEDURE — 84156 ASSAY OF PROTEIN URINE: CPT

## 2020-02-19 PROCEDURE — 85027 COMPLETE CBC AUTOMATED: CPT

## 2020-02-19 PROCEDURE — 78452 HT MUSCLE IMAGE SPECT MULT: CPT

## 2020-02-19 PROCEDURE — 82553 CREATINE MB FRACTION: CPT

## 2020-02-19 PROCEDURE — 99285 EMERGENCY DEPT VISIT HI MDM: CPT | Mod: 25

## 2020-02-19 PROCEDURE — 82550 ASSAY OF CK (CPK): CPT

## 2020-02-19 PROCEDURE — 84100 ASSAY OF PHOSPHORUS: CPT

## 2020-02-19 PROCEDURE — 82962 GLUCOSE BLOOD TEST: CPT

## 2020-02-19 PROCEDURE — 87631 RESP VIRUS 3-5 TARGETS: CPT

## 2020-02-19 PROCEDURE — A9502: CPT

## 2020-02-19 PROCEDURE — 84560 ASSAY OF URINE/URIC ACID: CPT

## 2020-02-19 PROCEDURE — 82570 ASSAY OF URINE CREATININE: CPT

## 2020-02-19 PROCEDURE — 83735 ASSAY OF MAGNESIUM: CPT

## 2020-02-19 RX ORDER — OMEPRAZOLE 10 MG/1
0 CAPSULE, DELAYED RELEASE ORAL
Qty: 0 | Refills: 0 | DISCHARGE

## 2020-02-19 RX ORDER — BUDESONIDE AND FORMOTEROL FUMARATE DIHYDRATE 160; 4.5 UG/1; UG/1
0 AEROSOL RESPIRATORY (INHALATION)
Qty: 0 | Refills: 0 | DISCHARGE
Start: 2020-02-19

## 2020-02-19 RX ORDER — FLUTICASONE PROPIONATE AND SALMETEROL 50; 250 UG/1; UG/1
0 POWDER ORAL; RESPIRATORY (INHALATION)
Qty: 0 | Refills: 0 | DISCHARGE

## 2020-02-19 RX ORDER — PANTOPRAZOLE SODIUM 20 MG/1
1 TABLET, DELAYED RELEASE ORAL
Qty: 0 | Refills: 0 | DISCHARGE
Start: 2020-02-19

## 2020-02-19 RX ORDER — BUMETANIDE 0.25 MG/ML
1 INJECTION INTRAMUSCULAR; INTRAVENOUS
Qty: 0 | Refills: 0 | DISCHARGE
Start: 2020-02-19

## 2020-02-19 RX ORDER — SACUBITRIL AND VALSARTAN 24; 26 MG/1; MG/1
1 TABLET, FILM COATED ORAL
Qty: 0 | Refills: 0 | DISCHARGE

## 2020-02-19 RX ORDER — METFORMIN HYDROCHLORIDE 850 MG/1
0 TABLET ORAL
Qty: 0 | Refills: 0 | DISCHARGE

## 2020-02-19 RX ORDER — ALBUTEROL 90 UG/1
0 AEROSOL, METERED ORAL
Qty: 0 | Refills: 0 | DISCHARGE

## 2020-02-19 RX ORDER — CLOTRIMAZOLE 10 MG
1 TROCHE MUCOUS MEMBRANE
Qty: 0 | Refills: 0 | DISCHARGE

## 2020-02-19 RX ORDER — MULTIVIT-MIN/FERROUS GLUCONATE 9 MG/15 ML
1 LIQUID (ML) ORAL
Qty: 0 | Refills: 0 | DISCHARGE
Start: 2020-02-19

## 2020-02-19 RX ORDER — TIOTROPIUM BROMIDE 18 UG/1
1 CAPSULE ORAL; RESPIRATORY (INHALATION)
Qty: 0 | Refills: 0 | DISCHARGE

## 2020-02-19 RX ORDER — METFORMIN HYDROCHLORIDE 850 MG/1
1 TABLET ORAL
Qty: 30 | Refills: 0
Start: 2020-02-19 | End: 2020-03-19

## 2020-02-19 RX ORDER — ASPIRIN/CALCIUM CARB/MAGNESIUM 324 MG
1 TABLET ORAL
Qty: 0 | Refills: 0 | DISCHARGE

## 2020-02-19 RX ORDER — ALBUTEROL 90 UG/1
2 AEROSOL, METERED ORAL
Qty: 0 | Refills: 0 | DISCHARGE
Start: 2020-02-19

## 2020-02-19 RX ORDER — ASPIRIN/CALCIUM CARB/MAGNESIUM 324 MG
1 TABLET ORAL
Qty: 0 | Refills: 0 | DISCHARGE
Start: 2020-02-19

## 2020-02-19 RX ORDER — BUDESONIDE AND FORMOTEROL FUMARATE DIHYDRATE 160; 4.5 UG/1; UG/1
2 AEROSOL RESPIRATORY (INHALATION)
Qty: 0 | Refills: 0 | DISCHARGE

## 2020-02-19 RX ORDER — TIOTROPIUM BROMIDE 18 UG/1
1 CAPSULE ORAL; RESPIRATORY (INHALATION)
Qty: 0 | Refills: 0 | DISCHARGE
Start: 2020-02-19

## 2020-02-19 RX ORDER — ATORVASTATIN CALCIUM 80 MG/1
1 TABLET, FILM COATED ORAL
Qty: 0 | Refills: 0 | DISCHARGE
Start: 2020-02-19

## 2020-02-19 RX ORDER — MULTIVIT-MIN/FERROUS GLUCONATE 9 MG/15 ML
1 LIQUID (ML) ORAL
Qty: 0 | Refills: 0 | DISCHARGE

## 2020-02-19 RX ADMIN — Medication 81 MILLIGRAM(S): at 12:46

## 2020-02-19 RX ADMIN — PANTOPRAZOLE SODIUM 40 MILLIGRAM(S): 20 TABLET, DELAYED RELEASE ORAL at 05:04

## 2020-02-19 RX ADMIN — Medication 1 UNIT(S): at 12:47

## 2020-02-19 RX ADMIN — Medication 1 TABLET(S): at 12:46

## 2020-02-19 RX ADMIN — Medication 2: at 12:47

## 2020-02-19 RX ADMIN — HEPARIN SODIUM 5000 UNIT(S): 5000 INJECTION INTRAVENOUS; SUBCUTANEOUS at 05:04

## 2020-02-19 RX ADMIN — Medication 10 MILLIGRAM(S): at 05:04

## 2020-02-19 RX ADMIN — Medication 1 APPLICATION(S): at 05:04

## 2020-02-19 RX ADMIN — TIOTROPIUM BROMIDE 1 CAPSULE(S): 18 CAPSULE ORAL; RESPIRATORY (INHALATION) at 12:46

## 2020-02-19 RX ADMIN — BUMETANIDE 1 MILLIGRAM(S): 0.25 INJECTION INTRAMUSCULAR; INTRAVENOUS at 05:04

## 2020-02-19 RX ADMIN — BUDESONIDE AND FORMOTEROL FUMARATE DIHYDRATE 2 PUFF(S): 160; 4.5 AEROSOL RESPIRATORY (INHALATION) at 12:45

## 2020-02-19 NOTE — PROGRESS NOTE ADULT - SUBJECTIVE AND OBJECTIVE BOX
Patient denies chest pain or shortness of breath.   Review of systems otherwise (-)  	  acetaminophen   Tablet .. 650 milliGRAM(s) Oral every 6 hours PRN  ALBUTerol    90 MICROgram(s) HFA Inhaler 2 Puff(s) Inhalation every 6 hours PRN  ammonium lactate 12% Lotion 1 Application(s) Topical two times a day  aspirin  chewable 81 milliGRAM(s) Oral daily  atorvastatin 40 milliGRAM(s) Oral at bedtime  budesonide  80 MICROgram(s)/formoterol 4.5 MICROgram(s) Inhaler 2 Puff(s) Inhalation two times a day  buMETAnide 1 milliGRAM(s) Oral two times a day  heparin  Injectable 5000 Unit(s) SubCutaneous every 8 hours  influenza   Vaccine 0.5 milliLiter(s) IntraMuscular once  insulin glargine Injectable (LANTUS) 12 Unit(s) SubCutaneous at bedtime  insulin lispro (HumaLOG) corrective regimen sliding scale   SubCutaneous Before meals and at bedtime  insulin lispro Injectable (HumaLOG) 1 Unit(s) SubCutaneous three times a day before meals  multivitamin/minerals 1 Tablet(s) Oral daily  pantoprazole    Tablet 40 milliGRAM(s) Oral before breakfast  predniSONE   Tablet 10 milliGRAM(s) Oral two times a day  tiotropium 18 MICROgram(s) Capsule 1 Capsule(s) Inhalation daily                            13.2   9.21  )-----------( 188      ( 19 Feb 2020 07:20 )             40.6       Hemoglobin: 13.2 g/dL (02-19 @ 07:20)  Hemoglobin: 13.1 g/dL (02-18 @ 05:57)  Hemoglobin: 12.7 g/dL (02-17 @ 06:55)  Hemoglobin: 12.9 g/dL (02-16 @ 07:26)  Hemoglobin: 12.5 g/dL (02-15 @ 06:58)      02-19    135  |  94<L>  |  86<H>  ----------------------------<  146<H>  4.4   |  33<H>  |  1.58<H>    Ca    9.4      19 Feb 2020 07:20  Phos  4.2     02-19  Mg     2.4     02-19    TPro  6.0  /  Alb  2.6<L>  /  TBili  0.8  /  DBili  x   /  AST  15  /  ALT  29  /  AlkPhos  115  02-19    Creatinine Trend: 1.58<--, 1.78<--, 1.11<--, 1.20<--, 0.85<--, 1.06<--    COAGS:           T(C): 36.2 (02-19-20 @ 10:04), Max: 37.4 (02-18-20 @ 14:43)  HR: 111 (02-19-20 @ 10:04) (78 - 111)  BP: 71/55 (02-19-20 @ 10:06) (71/55 - 120/85)  RR: 18 (02-19-20 @ 10:04) (17 - 18)  SpO2: 95% (02-19-20 @ 10:04) (95% - 100%)  Wt(kg): --    I&O's Summary    18 Feb 2020 07:01  -  19 Feb 2020 07:00  --------------------------------------------------------  IN: 400 mL / OUT: 0 mL / NET: 400 mL      Gen: Appears well in NAD  HEENT:  (-)icterus (-)pallor  CV: N S1 S2 1/6 ANNETTE (+)2 Pulses B/l  Resp:  Clear to ausculatation B/L, normal effort  GI: (+) BS Soft, NT, ND  Lymph:  (-)Edema, (-)obvious lymphadenopathy  Skin: Warm to touch, Normal turgor  Psych: Appropriate mood and affect      TELEMETRY: 	 off    ASSESSMENT/PLAN: 	79y  Female PMH COPD(3.5L), HFrEF(45%, Ischemic),CAD, Aortic dissection s/p repair and tissue AVR(on ASA 81mg), HTN, DM2 who presents with worsening leg swelling since 2 weeks.    -  Results reviewed.  patient with normal systolic function and normal perfusion ? Diastolic CHF.  Entresto unlikely to provide much benefit in this case would D/C  - Euvolemic at this point  - Edema is out of proportion to her cardiac status.  ? due to low protein and albumin.  ? consider w/u for nephrotic syndrome or protein loosing enteropathy  - Outpt cardiac f/u with primary cardiologist.  - D/W Dr. Dumas  - D/W daughter at bedside  - D/C planning per med    Fan Nelson MD, [facility]  BEEPER [phone]

## 2020-02-19 NOTE — DIETITIAN INITIAL EVALUATION ADULT. - FACTORS AFF FOOD INTAKE
Amish/ethnic/cultural/personal food preferences/difficulty chewing/acute on chronic comorbidities; poor dentition

## 2020-02-19 NOTE — PROGRESS NOTE ADULT - REASON FOR ADMISSION
Worsening of Bilateral legs.
Worsening of Bilateral legs/SOB
Worsening of Bilateral legs.

## 2020-02-19 NOTE — DISCHARGE NOTE NURSING/CASE MANAGEMENT/SOCIAL WORK - PATIENT PORTAL LINK FT
You can access the FollowMyHealth Patient Portal offered by [facility] by registering at the following website: [email or web address]. By joining [facility]’s FollowMyHealth portal, you will also be able to view your health information using other applications (apps) compatible with our system.

## 2020-02-19 NOTE — PROGRESS NOTE ADULT - SUBJECTIVE AND OBJECTIVE BOX
Patient is a 79y old  Female who presents with a chief complaint of Worsening of Bilateral legs/SOB, NST negative hypervolumic status improved, D/C rehab today.      INTERVAL HPI/OVERNIGHT EVENTS:  T(C): 36.2 (02-19-20 @ 10:04), Max: 37.4 (02-18-20 @ 14:43)  HR: 111 (02-19-20 @ 10:04) (78 - 111)  BP: 71/55 (02-19-20 @ 10:06) (71/55 - 120/85)  RR: 18 (02-19-20 @ 10:04) (17 - 18)  SpO2: 95% (02-19-20 @ 10:04) (95% - 100%)  Wt(kg): --    LABS:                        13.2   9.21  )-----------( 188      ( 19 Feb 2020 07:20 )             40.6     02-19    135  |  94<L>  |  86<H>  ----------------------------<  146<H>  4.4   |  33<H>  |  1.58<H>    Ca    9.4      19 Feb 2020 07:20  Phos  4.2     02-19  Mg     2.4     02-19    TPro  6.0  /  Alb  2.6<L>  /  TBili  0.8  /  DBili  x   /  AST  15  /  ALT  29  /  AlkPhos  115  02-19        CAPILLARY BLOOD GLUCOSE      POCT Blood Glucose.: 146 mg/dL (19 Feb 2020 08:06)  POCT Blood Glucose.: 204 mg/dL (18 Feb 2020 21:36)  POCT Blood Glucose.: 210 mg/dL (18 Feb 2020 16:45)  POCT Blood Glucose.: 176 mg/dL (18 Feb 2020 12:39)        RADIOLOGY & ADDITIONAL TESTS:    Consultant(s) Notes Reviewed:  [x ] YES  [ ] NO    PHYSICAL EXAM:  GENERAL: well built, well nourished  HEAD:  Atraumatic, Normocephalic  EYES: EOMI, PERRLA, conjunctiva and sclera clear  ENT: No tonsillar erythema, exudates, or enlargement; Moist mucous membranes, Good dentition, No lesions  NECK: Supple, No JVD, Normal thyroid, no enlarged nodes  NERVOUS SYSTEM:  Alert & Oriented X3, Good concentration; Motor Strength 5/5 B/L upper and lower extremities; DTRs 2+ intact and symmetric, sensory intact  CHEST/LUNG: B/L good air entry; less  rales, rhonchi, or wheezing  HEART: S1S2 normal, no S3, Regular rate and rhythm; No murmurs, rubs, or gallops  ABDOMEN: Soft, Nontender, Nondistended; Bowel sounds present  EXTREMITIES:  2+ Peripheral Pulses, No clubbing, cyanosis, edema significant improved  LYMPH: No lymphadenopathy noted  SKIN: No rashes or lesions    Care Discussed with Consultants/Other Providers [ x] YES  [ ] NO

## 2020-02-19 NOTE — DIETITIAN INITIAL EVALUATION ADULT. - PERTINENT LABORATORY DATA
02-19 Na135 mmol/L Glu 146 mg/dL<H> K+ 4.4 mmol/L Cr  1.58 mg/dL<H> BUN 86 mg/dL<H>   02-19 Phos 4.2 mg/dL   02-19 Alb 2.6 g/dL<L>     02-13 [id number] 11.5 %<H>   02-13 Chol 166 mg/dL LDL 25 mg/dL HDL 60 mg/dL Trig 404 mg/dL<H> 02-19 Na135 mmol/L Glu 146 mg/dL<H> K+ 4.4 mmol/L Cr  1.58 mg/dL<H> BUN 86 mg/dL<H>   02-19 Phos 4.2 mg/dL   02-19 Alb 2.6 g/dL<L>  Finger stick ritjt=187 to 263   02-13 [id number] 11.5 %<H>   02-13 Chol 166 mg/dL LDL 25 mg/dL HDL 60 mg/dL Trig 404 mg/dL<H>

## 2020-02-19 NOTE — PROGRESS NOTE ADULT - ASSESSMENT
#KYLE on CKD III in a patient  with CHF. 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 SCR slightly increased fro myesterday. volume status better.    recs:  Cont BUMEX 1 mg BID   RPT BMP in AM  avoid NSAID's and IV contrast  no objection to D/C to rehab
#KYLE on CKD III in a patient  with CHF. 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 improved with diuresis and stoppage of NSAIDS. volume status better.    recs:  Cont BUMEX 1 mg BID   RPT BMP in AM  avoid NSAID's and IV contrast
#KYLE on CKD III in a patinet with CHF. 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 improved with diuresis and stoppage of NSAIDS. volume status better.  # hyperkalemia. resolved      recs:  Cont BUMEX 1 mg BID   RPT BMP in AM  avoid NSAID's and IV contrast
79 yr old female , H/O COPD/CAD/aortic disection/AVR/edema, admit hospital for worsening leg edema associated with SOB/KYLE, start diuretics maintain negative, add metalazone 5 mg po bid, BUN/CR stable, SOB/edema improved, F/U ECHO, cardiology consult, close monitor labs, DVT prophylaxis, fall precaution, PT eval, D/C planning if ECHO Ok, continue diuretics maintain negative, may need D/C rehab, fall precaution
79 yr old female , H/O COPD/CAD/aortic disection/AVR/edema, admit hospital for worsening leg edema associated with SOB/KYLE, start diuretics maintain negative, add metolazone 5 mg po bid, BUN/CR stable, SOB/edema improved,  ECHO normal LVEF,  cardiology consult, close monitor labs,BUN/cR improved,  DVT prophylaxis, fall precaution, PT eval, F/U NST result, fall precaution, oxygen treatment. S/P low  lowered  Entresto dose, D/C coreg, BP improved today, D/C planing after NST . Rehab(?). fall precaution
79 yr old female , H/O COPD/CAD/aortic disection/AVR/edema, admit hospital for worsening leg edema associated with SOB/KYLE, start diuretics maintain negative, add metolazone 5 mg po bid, BUN/CR stable, SOB/edema improved,  ECHO normal LVEF,  cardiology consult, close monitor labs,BUN/cR improved,  DVT prophylaxis, fall precaution, PT eval, NST Monday, fall precaution, oxygen treatment. C/O very tired today and low BP, lower Entresto dose, D/C coreg, close monitor vitals, F/U NST Monday, fall precaution
79 yr old female , H/O COPD/CAD/aortic disection/AVR/edema, admit hospital for worsening leg edema associated with SOB/KYLE, start diuretics maintain negative, add metolazone 5 mg po bid, BUN/CR stable, SOB/edema improved, F/U ECHO, cardiology consult, close monitor labs, DVT prophylaxis, fall precaution, PT eval, NST Monday, fall precaution, oxygen treatment
79 yr old female , H/O COPD/CAD/aortic disection/AVR/edema, admit hospital for worsening leg edema associated with SOB/KYLE/CKD, start diuretics maintain negative, add metolazone 5 mg po bid, BUN/CR stable, SOB/edema improved,  ECHO normal LVEF,  cardiology consult, close monitor labs,BUN/cR improved,  DVT prophylaxis, fall precaution, PT eval,  NST negative for ischemia, patient had normal LVEF, will not benefit from Entresto, discontinue, BP improved, pending D/C rehab, monitor lab at NH, PT/OT/oxygen treatment, fall precaution, F/U cardiology out patient
79 yr old female , H/O COPD/CAD/aortic disection/AVR/edema, admit hospital for worsening leg edema associated with SOB/KYLE/CKD, start diuretics maintain negative, add metolazone 5 mg po bid, BUN/CR stable, SOB/edema improved,  ECHO normal LVEF,  cardiology consult, close monitor labs,BUN/cR improved,  DVT prophylaxis, fall precaution, PT eval,  NST negative for ischemia, vital stable, will continue diuretics, close monitor BUN/CR/oxygen, fall precaution, monitor BUN/CR close at rehab, D/C rehab.
79 yr old female from [facility], H/O COPD/CAD/aortic disection/AVR/edema, admit hospital for worsening leg edema associated with SOB/KYLE, start diuretics maintain negative, oxygen, ECHO, cardiology consult, close monitor labs, DVT prophylaxis, fall precaution, tele monitor, renal consult for KYLE.
79F from home, lives with daughter, ambulates independently with  PMH COPD(3.5L), HFrEF(45%, Ischemic),CAD, Aortic dissection s/p AVR(on ASA 81mg), HTN, DM2 who presents with worsening leg swelling since 2 weeks.  Pt is admitted for KYLE and Uncontrolled Hyperglycemia.
A/P  IS DOING  BETTER  VOL  STATUS HAS  IMPROVED  ON BUMETANIDE 1  MG PO  BID   IS OFF  METOLAZONE    CR   OK  AT  1.2  TODAY   BP  IS ALSO  ACCEPTABLE  WILL  CON T  CURRENT MANAGEMENT  AND  FOLLOW  VOL STATUS  AND  RENAL  PROFILE
A/P  WAS IN  WITH  VOL OVERLOAD,  HAS  IMPROVED  A  LOT  IS  ON  BUMETANIDE 1  MG  IV  BID, WILL  SWITCH  TO   P O   1  MG  BID,  CONT  WITH   METOLAZONE  FO R TODAY    SERUM  SODIUM  NORMAL  AT  138     K  ALSO OK      BP  IS  BORDERLINE  , LOW,    ONLY ON  CARVEDILOL   ADV  TO  AVOID  ALL NSAID,  WAS  TAKING  MELOXICAM  AT  HOME  WILL  FOLLOW
79F from home, lives with daughter, ambulates independently with  PMH COPD(3.5L), HFrEF(45%, Ischemic),CAD, Aortic dissection s/p AVR(on ASA 81mg), HTN, DM2 who presents with worsening leg swelling since 2 weeks.  Pt is admitted for KYLE and Uncontrolled Hyperglycemia.

## 2020-02-19 NOTE — DIETITIAN INITIAL EVALUATION ADULT. - PERTINENT MEDS FT
MEDICATIONS  (STANDING):  ammonium lactate 12% Lotion 1 Application(s) Topical two times a day  aspirin  chewable 81 milliGRAM(s) Oral daily  atorvastatin 40 milliGRAM(s) Oral at bedtime  budesonide  80 MICROgram(s)/formoterol 4.5 MICROgram(s) Inhaler 2 Puff(s) Inhalation two times a day  buMETAnide 1 milliGRAM(s) Oral two times a day  heparin  Injectable 5000 Unit(s) SubCutaneous every 8 hours  influenza   Vaccine 0.5 milliLiter(s) IntraMuscular once  insulin glargine Injectable (LANTUS) 12 Unit(s) SubCutaneous at bedtime  insulin lispro (HumaLOG) corrective regimen sliding scale   SubCutaneous Before meals and at bedtime  insulin lispro Injectable (HumaLOG) 1 Unit(s) SubCutaneous three times a day before meals  multivitamin/minerals 1 Tablet(s) Oral daily  pantoprazole    Tablet 40 milliGRAM(s) Oral before breakfast  predniSONE   Tablet 10 milliGRAM(s) Oral two times a day  sacubitril 24 mG/valsartan 26 mG 1 Tablet(s) Oral two times a day  tiotropium 18 MICROgram(s) Capsule 1 Capsule(s) Inhalation daily

## 2020-02-19 NOTE — DIETITIAN INITIAL EVALUATION ADULT. - OTHER INFO
Pt alert, verbally responsive, forgetful/confused at times reported, daughter at bedside, well-communicated, lives home with family PTA; appetite good, unclear recent wt changes; tolerating soft food, denied GI distress/swallowing problem, food choices obtained and forwarded to dietary; uncontrolled DM due to steroids per MD, only on Metformin at home, Insulin started in-house per MD, better controlled; , pt/family wants more nutrition information for diet Rx, very receptive Pt alert, verbally responsive, forgetful/confused at times reported, daughter at bedside, well-communicated, lives home with family PTA; appetite good, unclear recent wt changes; tolerating soft food, denied GI distress/swallowing problem, food choices obtained and forwarded to dietary; uncontrolled DM due to steroids per MD, only on Metformin at home, Insulin started in-house per MD, better controlled; pt/family wants more nutrition information for diet Rx, very receptive; Pending discharge planning to skilled nursing facility for rehab when medically ready

## 2020-08-19 NOTE — PROGRESS NOTE ADULT - REASON FOR ADMISSION
Patient called back, ADAIR was able to explain recommendations from psych evaluation that patient should complete course of therapeutic intervention with therapist prior to proceeding to surgery, estimated completion date October 2020  Patient said his signing physician Dr Zaria Magana said he could contest this with psychiatrist but after review of workflow and other items that need to be completed patient likely won't proceed to surgery much before October anyway  Patient will continue with treatment to completion and is aware that an updated letter that summarizes completion of treatment, that there are no contraindications for surgery and any other treatment recommendations  Patient contacted PCP to have referral or letter of support for surgery sent to us and patient believes he does have to weight three months from last A1C to have another one done  Patient asked to reschedule his 9/15 in person appointment with ADAIR and [facility] because he has another appointment at that time    He is scheduled for 9/14 at 8:30am 
SW left message with patient's wife to follow up on psych evaluation outcome  She said she would have patient call her back, [facility] office number given 
Renal Failure
KYLE/fall/weakness
Renal Failure/fall
fall/weakness/ARF
weakness/ARF/fall
Renal Failure

## 2020-09-04 NOTE — ED PROVIDER NOTE - NS_ATTENDINGSCRIBE_ED_ALL_ED
I personally performed the service described in the documentation recorded by the scribe in my presence, and it accurately and completely records my words and actions. 2

## 2021-03-04 NOTE — CONSULT NOTE ADULT - CONSULT REQUESTED DATE/TIME
12-Feb-2020 16:44 detailed exam detailed exam detailed exam detailed exam detailed exam detailed exam detailed exam detailed exam detailed exam detailed exam detailed exam detailed exam detailed exam detailed exam detailed exam detailed exam detailed exam detailed exam detailed exam detailed exam detailed exam detailed exam detailed exam detailed exam detailed exam detailed exam detailed exam detailed exam detailed exam detailed exam detailed exam detailed exam detailed exam detailed exam detailed exam detailed exam detailed exam detailed exam detailed exam detailed exam

## 2021-09-13 NOTE — PROGRESS NOTE ADULT - PROBLEM/PLAN-8
No Vaccines Administered.
DISPLAY PLAN FREE TEXT

## 2021-11-02 NOTE — PROGRESS NOTE ADULT - PROVIDER SPECIALTY LIST ADULT
Internal Medicine
Nephrology
All other review of systems negative, except as noted in HPI

## 2021-12-23 NOTE — DISCHARGE NOTE NURSING/CASE MANAGEMENT/SOCIAL WORK - MODE OF TRANSPORTATION
Received call from [phone]. She states that she will be able to take pt and stay with him for his cataract surgery but 1/17 will not work b/c they are closed that day. Will see if Dr. Reta Gill office can reschedule procedure. Attempted to reach Duane to provide update. No answer. Message left to return call. Received return call from [facility]. Informed him that we will work on getting pt to 1/14 appt in [place] (ortho) through [address]. Cannot be scheduled until Jan 3rd. Informed him Aron PATTEN can attend cataract surgery with him but will not be able ton 1/17. Pt agreeable in moving surgery date. Pt requesting I contact Dr. Reta Gill office. Message left with surgery scheduling at [facility] [place] [place] requesting call back. Received call back from [facility] [place] [place]. Spoke with [facility]. Explained to her that [address] will be bringing pt and staying with him and she will be off on 1/17 as [facility] is closed. Surgery rescheduled for 2/9/22 at 1050am.  Will see if Salvador Gomez can attend appt that day. Contacted Helen. Message left informed her of new surgery date and time. Requested return call.
Ambulance

## 2023-09-13 NOTE — ED PROVIDER NOTE - QUALITY
aching 13.2   7.50  )-----------( 285      ( 13 Sep 2023 15:50 )             40.8       09-13    138  |  101  |  12  ----------------------------<  94  4.2   |  24  |  0.94    Ca    10.0      13 Sep 2023 15:50    TPro  7.2  /  Alb  4.2  /  TBili  0.6  /  DBili  x   /  AST  27  /  ALT  15  /  AlkPhos  64  09-13      Troponin T, High Sensitivity Result: <6:  (09.13.23 @ 15:50)      - - - - - - - - - - - - - - - - - - - - - - - - - - - - - - - - - - - - - - - - - - - - - - - - - - - -       EKG personally reviewed:  NSR 65bpm     Images personally reviewed:   < from: CT Angio Brain Stroke Protocol  w/ IV Cont (09.13.23 @ 16:24) >  IMPRESSION: Unremarkable noncontrast brain CT. Normal CTA of the head and   neck. Normal CT perfusion.

## 2024-05-31 NOTE — H&P ADULT - PROBLEM SELECTOR PLAN 3
No
-- hyponatremia likely 2/2 Hyperglycemia  Cardiorenal Syndrome Vs poor po intake Vs KYLE vs NSAID   - on exam: AAOx3 on admission , Asymptomatic   - on admission Na 128  - corrected Na to glucose : 134  f/u Urine electrolytes  F/U Urine osm, Serum osm

## 2025-03-24 NOTE — H&P ADULT - NSICDXPASTSURGICALHX_GEN_ALL_CORE_FT
[FreeTextEntry1] :  MD to interpret nuclear stress test.
PAST SURGICAL HISTORY:  No significant past surgical history